# Patient Record
Sex: MALE | Race: WHITE | HISPANIC OR LATINO | Employment: OTHER | URBAN - METROPOLITAN AREA
[De-identification: names, ages, dates, MRNs, and addresses within clinical notes are randomized per-mention and may not be internally consistent; named-entity substitution may affect disease eponyms.]

---

## 2017-08-31 ENCOUNTER — SLEEP CENTER VISIT (OUTPATIENT)
Dept: SLEEP MEDICINE | Facility: MEDICAL CENTER | Age: 22
End: 2017-08-31
Payer: MEDICAID

## 2017-08-31 VITALS
WEIGHT: 315 LBS | HEART RATE: 82 BPM | RESPIRATION RATE: 22 BRPM | BODY MASS INDEX: 45.1 KG/M2 | DIASTOLIC BLOOD PRESSURE: 92 MMHG | SYSTOLIC BLOOD PRESSURE: 140 MMHG | TEMPERATURE: 98.8 F | HEIGHT: 70 IN | OXYGEN SATURATION: 92 %

## 2017-08-31 DIAGNOSIS — R63.8 INCREASED BMI: ICD-10-CM

## 2017-08-31 DIAGNOSIS — G47.33 OSA (OBSTRUCTIVE SLEEP APNEA): ICD-10-CM

## 2017-08-31 PROCEDURE — 99244 OFF/OP CNSLTJ NEW/EST MOD 40: CPT | Performed by: INTERNAL MEDICINE

## 2017-08-31 RX ORDER — ZOLPIDEM TARTRATE 5 MG/1
TABLET ORAL
Qty: 3 TAB | Refills: 0 | Status: SHIPPED | OUTPATIENT
Start: 2017-08-31 | End: 2021-10-25

## 2017-08-31 NOTE — PROGRESS NOTES
"CC: Sleep apnea evaluation    HPI:     22 year old male kindly referred by Dr. Delonte Vargas at [facility] for reevaluation of obstructive sleep syndrome.    The patient evidently has had multiple prior sleep studies studies performed which demonstrated sleep apnea but was noncompliant with positive airway pressure secondary to the mask straps being uncomfortable. The patient's symptoms include frequent nocturnal awakenings about 10 times a night and nocturia about 10 times a night, difficulty awakening and getting out of bed after sleeping, napping or returning to bed after arising, sleepiness during the day, too much sleep during the day, too little sleep at night, tiredness during the day, sleep paralysis episodes, difficulty catching his breath, sleep talking, teeth grinding, bedwetting, disturbing dreams, nervous legs, and loud and disturbing snoring. The patient also has a history of extremity movements during sleep.    He may fall asleep accidentally when watching TV, talking on the phone, or riding in a bus or car.    His roommate questionnaire reveals loud snoring, witnessed apneas, sleep talking, bedwetting, and resuscitative snorts since age 8. His episodes occur throughout the night. His total [place] score is 15.    Review of his sleep diary shows that he napped 5 out of 7 days. He woke up feeling \"good\" 7 out of 7 days.    Sunita his  accompanies him today.            Patient Active Problem List    Diagnosis Date Noted   • Cellulitis 09/19/2015       Past Medical History:   Diagnosis Date   • ADHD (attention deficit hyperactivity disorder)    • Apnea, sleep    • Bipolar 1 disorder (CMS-[facility])    • Daytime sleepiness    • Gasping for breath    • Obesity    • ODD (oppositional defiant disorder)    • Sleep apnea    • Snoring         Past Surgical History:   Procedure Laterality Date   • TONSILLECTOMY  3/17/2010    Performed by ANKIT HERNANDEZ at SURGERY SAME DAY [facility] ORS   • " "APPENDECTOMY CHILD         Family History   Problem Relation Age of Onset   • Family history unknown: Yes       Social History     Social History   • Marital status: Single     Spouse name: N/A   • Number of children: N/A   • Years of education: N/A     Occupational History   • Not on file.     Social History Main Topics   • Smoking status: Never Smoker   • Smokeless tobacco: Never Used   • Alcohol use No   • Drug use: No   • Sexual activity: Not on file     Other Topics Concern   • Not on file     Social History Narrative   • No narrative on file       Current Outpatient Prescriptions   Medication Sig Dispense Refill   • CARBAMAZEPINE PO Take  by mouth.     • LITHIUM CARBONATE PO Take  by mouth.     • zolpidem (AMBIEN) 5 MG Tab Take 1-2 tablets by mouth every evening as needed for insomnia. Bring to sleep study. 3 Tab 0   • lithium CR (ESKALITH CR) 450 MG Tab CR Take 450 mg by mouth every day.     • aripiprazole (ABILIFY) 15 MG Tab Take 30 mg by mouth every day.     • ibuprofen (MOTRIN) 600 MG Tab Take 1 Tab by mouth every 6 hours as needed. (Patient not taking: Reported on 8/31/2017) 45 Tab 0     No current facility-administered medications for this visit.     \"CURRENT RX\"    ALLERGIES: Review of patient's allergies indicates no known allergies.    ROS  Constitutional:Complains of fever and fatigue  Eyes: Denies glasses, vision loss, pain, drainage, double vision.   Ears/Nose/Mouth/Throat: Denies earache, difficulty hearing, ringing/buzzing in ears,  + rhinitis/nasal congestion, injury, recurrent sore throat, persistent hoarseness, decayed teeth/toothaches.   Cardiovascular: Denies chest pain, tightness, palpitations,  + swelling in legs/feet, fainting,  + difficulty breathing when lying down but gets better when sitting up.   Respiratory: Complains of shortness breath, cough, wheezing, painful breathing.  Sleep: Per history of present illness  Gastrointestinal: Denies heartburn, difficulty swallowing, nausea, " "abdominal pain, diarrhea, constipation.   Genitourinary: Denies, painful urination, blood in urine, discharge. Complains of frequent urination  Musculoskeletal: Denies painful joints,  + sore muscles, back pain.   Integumentary: Denies rashes,  + lumps, color changes.   Neurological: Denies frequent headaches, + dizziness,  + weakness    PHYSICAL EXAM  MSEW  /92   Pulse 82   Temp 37.1 °C (98.8 °F)   Resp (!) 22   Ht 1.778 m (5' 10\")   Wt (!) 206.4 kg (455 lb)   SpO2 92%   BMI 65.29 kg/m²   Appearance: Well-nourished, well-developed, no acute distress  Eyes:  PERRLA, EOMI  Hearing:  Grossly intact  Nose:  Normal, no lesions or deformities, turbinates moist  Oropharynx:  Tongue normal, posterior pharynx without erythema or exudate  Mallampati classification:    Neck: Supple, trachea midline, no masses  Respiratory effort:  No intercostal retractions or use of accessory muscles  Lung auscultation:  No wheezes rhonchi rubs or rales  Cardiac auscultation:  No murmurs, rubs, or gallops, no regular rhythm, normal rate  Abdomen:  No tenderness, no organomegaly  Extremities:  No cyanosis, clubbing; erythema and edema  Gait and Station:  Normal  Digits and nails: No clubbing, cyanosis, petechiae, or nodes  Musculoskeletal:  Grossly normal  Skin:  No rashes  Orientation:  Oriented time, place, and person  Mood and affect:  No depression, anxiety, agitation  Judgment:  Intact    PROBLEMS:    1. JEAN (obstructive sleep apnea)  - zolpidem (AMBIEN) 5 MG Tab; Take 1-2 tablets by mouth every evening as needed for insomnia. Bring to sleep study.  Dispense: 3 Tab; Refill: 0  - POLYSOMNOGRAPHY, 4 OR MORE; Future  2. Increased BMI        PLAN:   The patient has signs and symptoms consistent with obstructive sleep apnea hypopnea syndrome. He has had prior diagnostic studies which have reportedly shown significant sleep apnea. He's never been able to comply with positive airway pressure therapy. We do not have any of his prior " sleep studies. We will schedule him to have a repeat qualifying test but if he has already had one within the past 5 years, we could simply do a positive airway pressure titration treatment instead. Will schedule to have a nocturnal polysomnogram using zolpidem to assist with sleep onset and maintenance should the need arise. Will return after the results are available to determine further diagnostic needs and/or treatment options.    He will bring his Abilify which he uses for sleep. Can use zolpidem if he continues to have insomnia the night of the PSG.    The risks of untreated sleep apnea were discussed with the patient at length. Patients with JEAN are at increased risk of cardiovascular disease including coronary artery disease, systemic arterial hypertension, pulmonary arterial hypertension, cardiac arrythmias, and stroke. JEAN patients have an increased risk of motor vehicle accidents, type 2 diabetes, chronic kidney disease, and non-alcoholic liver disease. The patient was advised to avoid driving a motor vehicle when drowsy.    Positive airway pressure, such as CPAP, is considered first-line and preferred therapy for sleep apnea and may reverse both symptoms and risks.     Return for after sleep study.

## 2017-11-11 ENCOUNTER — SLEEP STUDY (OUTPATIENT)
Dept: SLEEP MEDICINE | Facility: MEDICAL CENTER | Age: 22
End: 2017-11-11
Attending: INTERNAL MEDICINE
Payer: MEDICAID

## 2017-11-11 DIAGNOSIS — G47.33 OSA (OBSTRUCTIVE SLEEP APNEA): ICD-10-CM

## 2017-11-11 PROCEDURE — 95811 POLYSOM 6/>YRS CPAP 4/> PARM: CPT | Performed by: INTERNAL MEDICINE

## 2017-11-14 ENCOUNTER — APPOINTMENT (OUTPATIENT)
Dept: SLEEP MEDICINE | Facility: MEDICAL CENTER | Age: 22
End: 2017-11-14
Payer: MEDICAID

## 2017-11-14 NOTE — PROCEDURES
CLINICAL COMMENTS:  The patient underwent a split night polysomnogram with a CPAP titration using the standard montage for measurement of parameters of sleep, respiratory events, movement abnormalities, heart rate and rhythm. A microphone was used to monitor snoring.    INTERPRETATION: The diagnostic recording time was 143.4 minutes with a sleep period of 141.0 minutes.  Total sleep time was 109.5 minutes with a sleep efficiency of 76.4%.  The sleep latency was 2.4 minutes, and REM latency was N/A minutes.  The patient had 82 arousals in total, for an arousal index of 44.9.        RESPIRATORY: The patient had 254 apneas in total.  Of these, 12 were obstructive apneas, and 242 were central apneas.  This resulted in an apnea index (AI) of 139.2.  The patient had 28 hypopneas in total, which resulted in a hypopnea index of 15.3.  The overall AHI was 154.5, while the AHI during REM was N/A.  The supine AHI = 153.4.    OXIMETRY: Oxygen saturation monitoring showed a mean SpO2 of 84.6% for the diagnostic part of the study, with a minimum oxygen saturation of 56.0%.  Oxygen saturations were below 89% for 70.7% of sleep time.    CARDIAC: The highest heart rate for the first part of the study was 104.0 beats per minute.  The average heart rate during sleep was 67.7 bpm, while the highest heart rate was 94.0 bpm.    LIMB MOVEMENTS: There were a total of 2 periodic limb movements during sleep, of which 0 were PLMS arousals.  This resulted in a PLMS index of 1.1 and a PLMS arousal index of 0.0.    TREATMENT    Treatment recording time was 291.8 minutes with a total sleep time of 258.9min.  The patient had an arousal index of 23.6.      RESPIRATORY: The patient had 7 obstructive apneas, 59 central apneas, and 198 hypopneas for an overall AHI was 61.2.    OXIMETRY: The mean SpO2 during treatment was 86.2%, with a minimum oxygen saturation of 68.0%.      Interpretation:    This was an overnight diagnostic polysomnogram with a  subsequent positive airway pressure titration.    During the diagnostic phase, the patient experienced a reduced sleep efficiency of 76.4% and had no REM. Stage durations showed 31.5 minutes of wake after sleep onset, 22.5 minutes of stage I sleep, 59.5 minutes of stage II sleep, 27.5 minutes of stage III sleep, and no REM. The latency to sleep was shortened at 2.4 minutes. Significant sleep fragmentation was found primarily secondary to frequent central apneas.    Severe mainly central sleep apnea was found. Central apneas comprised 85.8% of the 282 apneas and hypopneas. The apnea hypopnea index was 154.5, the mean event duration was 12.5 seconds, and the longest event lasted 33.8 seconds. The lowest saturation during sleep was 56% and saturations were normal, that is greater than or equal to 90%, for only 26.4% of the diagnostic recording.    Once the patient met the split-night protocol, the technician performed a positive airway pressure titration. The technician initiated treatment with CPAP at 5 cm water and gradually increased the pressure to a maximum of 14 cm water without event resolution. Bilevel was then tried starting with bilevel 18/14 and ending with bilevel 20/16 cm water. The patient did best on bilevel 19/15 with a resultant AHI 16.2 and improved saturations.    Recommendation:    Recommend the patient likely take a look at return for an ASV titration. Alternatively, a trial of bilevel 19/15 cm water is an option.

## 2017-11-28 ENCOUNTER — SLEEP CENTER VISIT (OUTPATIENT)
Dept: SLEEP MEDICINE | Facility: MEDICAL CENTER | Age: 22
End: 2017-11-28
Payer: MEDICAID

## 2017-11-28 VITALS
BODY MASS INDEX: 45.1 KG/M2 | HEIGHT: 70 IN | RESPIRATION RATE: 18 BRPM | WEIGHT: 315 LBS | DIASTOLIC BLOOD PRESSURE: 88 MMHG | SYSTOLIC BLOOD PRESSURE: 120 MMHG | OXYGEN SATURATION: 91 % | HEART RATE: 81 BPM

## 2017-11-28 DIAGNOSIS — G47.31 CENTRAL SLEEP APNEA: ICD-10-CM

## 2017-11-28 PROCEDURE — 99214 OFFICE O/P EST MOD 30 MIN: CPT | Performed by: FAMILY MEDICINE

## 2017-11-28 NOTE — PATIENT INSTRUCTIONS
Sleep Apnea   Sleep apnea is a sleep disorder characterized by abnormal pauses in breathing while you sleep. When your breathing pauses, the level of oxygen in your blood decreases. This causes you to move out of deep sleep and into light sleep. As a result, your quality of sleep is poor, and the system that carries your blood throughout your body (cardiovascular system) experiences stress. If sleep apnea remains untreated, the following conditions can develop:  · High blood pressure (hypertension).  · Coronary artery disease.  · Inability to achieve or maintain an erection (impotence).  · Impairment of your thought process (cognitive dysfunction).  There are three types of sleep apnea:  1. Obstructive sleep apnea--Pauses in breathing during sleep because of a blocked airway.  2. Central sleep apnea--Pauses in breathing during sleep because the area of the brain that controls your breathing does not send the correct signals to the muscles that control breathing.  3. Mixed sleep apnea--A combination of both obstructive and central sleep apnea.  RISK FACTORS  The following risk factors can increase your risk of developing sleep apnea:  · Being overweight.  · Smoking.  · Having narrow passages in your nose and throat.  · Being of older age.  · Being male.  · Alcohol use.  · Sedative and tranquilizer use.  · Ethnicity. Among individuals younger than 35 years,  Americans are at increased risk of sleep apnea.  SYMPTOMS   · Difficulty staying asleep.  · Daytime sleepiness and fatigue.  · Loss of energy.  · Irritability.  · Loud, heavy snoring.  · Morning headaches.  · Trouble concentrating.  · Forgetfulness.  · Decreased interest in sex.  · Unexplained sleepiness.  DIAGNOSIS   In order to diagnose sleep apnea, your caregiver will perform a physical examination. A sleep study done in the comfort of your own home may be appropriate if you are otherwise healthy. Your caregiver may also recommend that you spend the  "night in a sleep lab. In the sleep lab, several monitors record information about your heart, lungs, and brain while you sleep. Your leg and arm movements and blood oxygen level are also recorded.  TREATMENT  The following actions may help to resolve mild sleep apnea:  · Sleeping on your side.    · Using a decongestant if you have nasal congestion.    · Avoiding the use of depressants, including alcohol, sedatives, and narcotics.    · Losing weight and modifying your diet if you are overweight.  There also are devices and treatments to help open your airway:  · Oral appliances. These are custom-made mouthpieces that shift your lower jaw forward and slightly open your bite. This opens your airway.  · Devices that create positive airway pressure. This positive pressure \"splints\" your airway open to help you breathe better during sleep. The following devices create positive airway pressure:  ¨ Continuous positive airway pressure (CPAP) device. The CPAP device creates a continuous level of air pressure with an air pump. The air is delivered to your airway through a mask while you sleep. This continuous pressure keeps your airway open.  ¨ Nasal expiratory positive airway pressure (EPAP) device. The EPAP device creates positive air pressure as you exhale. The device consists of single-use valves, which are inserted into each nostril and held in place by adhesive. The valves create very little resistance when you inhale but create much more resistance when you exhale. That increased resistance creates the positive airway pressure. This positive pressure while you exhale keeps your airway open, making it easier to breath when you inhale again.  ¨ Bilevel positive airway pressure (BPAP) device. The BPAP device is used mainly in patients with central sleep apnea. This device is similar to the CPAP device because it also uses an air pump to deliver continuous air pressure through a mask. However, with the BPAP machine, the " pressure is set at two different levels. The pressure when you exhale is lower than the pressure when you inhale.  · Surgery. Typically, surgery is only done if you cannot comply with less invasive treatments or if the less invasive treatments do not improve your condition. Surgery involves removing excess tissue in your airway to create a wider passage way.     This information is not intended to replace advice given to you by your health care provider. Make sure you discuss any questions you have with your health care provider.     Document Released: 12/08/2003 Document Revised: 01/08/2016 Document Reviewed: 04/25/2013  [employer] Interactive Patient Education ©2016 [employer]

## 2017-11-28 NOTE — PROGRESS NOTES
[facility] Sleep [facility] Follow Up Note     Date: 11/28/2017 / Time: 9:51 AM    Patient ID:   Name:             Adama Burt   YOB: 1995  Age:                 22 y.o.  male   MRN:               [id number]      Thank you for requesting a sleep medicine consultation on Adama Burt at the sleep center. He presents today with the chief complaints of JEAN and sleep study follow up.     HISTORY OF PRESENT ILLNESS:       Pt is currently not on PAP therapy. He goes to sleep around 10 pm and wakes up around 6-8 am. He is getting about 5-8 hrs of sleep on a good night and about 5 hr of sleep on a bad night. The bad nights are about few times per week.    The split night study on 11/14/17 showed Severe mainly central sleep apnea was found. Central apneas   comprised 85.8% of the 282 apneas and hypopneas. The apnea hypopnea index was 154.5/hr. He met the split night criteria. treatment with CPAP at 5 cm water and gradually increased the pressure to a maximum of 14 cm water without event resolution. Bilevel was then tried starting with bilevel 18/14 and ending with bilevel 20/16 cm water. The patient did best on bilevel 19/15 with a resultant AHI 16.2 and improved saturations    REVIEW OF SYSTEMS:       Constitutional: Denies fevers, Denies weight changes  Eyes: Denies changes in vision, no eye pain  Ears/Nose/Throat/Mouth: Denies nasal congestion or sore throat   Cardiovascular: Denies chest pain or palpitations   Respiratory: Denies shortness of breath , Denies cough  Gastrointestinal/Hepatic: Denies abdominal pain, nausea, vomiting, diarrhea, constipation or GI bleeding   Genitourinary: Denies bladder dysfunction, dysuria or frequency  Musculoskeletal/Rheum: Denies  joint pain and swelling   Skin/Breast: Denies rash,   Neurological: Denies headache, confusion, memory loss or focal weakness/parasthesias  Psychiatric: denies mood disorder     Comprehensive review of systems form is reviewed  "with the patient and is attached in the EMR.     PMH:  has a past medical history of ADHD (attention deficit hyperactivity disorder); Apnea, sleep; Bipolar 1 disorder (CMS-HCC); Daytime sleepiness; Gasping for breath; Obesity; ODD (oppositional defiant disorder); Sleep apnea; and Snoring.  MEDS:   Current Outpatient Prescriptions:   •  CARBAMAZEPINE PO, Take  by mouth., Disp: , Rfl:   •  aripiprazole (ABILIFY) 15 MG Tab, Take 30 mg by mouth every day., Disp: , Rfl:   •  LITHIUM CARBONATE PO, Take  by mouth., Disp: , Rfl:   •  zolpidem (AMBIEN) 5 MG Tab, Take 1-2 tablets by mouth every evening as needed for insomnia. Bring to sleep study., Disp: 3 Tab, Rfl: 0  •  ibuprofen (MOTRIN) 600 MG Tab, Take 1 Tab by mouth every 6 hours as needed. (Patient not taking: Reported on 8/31/2017), Disp: 45 Tab, Rfl: 0  •  lithium CR (ESKALITH CR) 450 MG Tab CR, Take 450 mg by mouth every day., Disp: , Rfl:   ALLERGIES: No Known Allergies  SURGHX:   Past Surgical History:   Procedure Laterality Date   • TONSILLECTOMY  3/17/2010    Performed by ANKIT HERNANDEZ at SURGERY SAME DAY [facility] ORS   • APPENDECTOMY CHILD       SOCHX:  reports that he has never smoked. He has never used smokeless tobacco. He reports that he does not drink alcohol or use drugs..   FH:   Family History   Problem Relation Age of Onset   • Family history unknown: Yes         Physical Exam:  Vitals/ General Appearance:   Weight/BMI: Body mass index is 65.29 kg/m².  Blood pressure 120/88, pulse 81, resp. rate 18, height 1.778 m (5' 10\"), weight (!) 206.4 kg (455 lb), SpO2 91 %.  Vitals:    11/28/17 1036   BP: 120/88   Pulse: 81   Resp: 18   SpO2: 91%   Weight: (!) 206.4 kg (455 lb)   Height: 1.778 m (5' 10\")       Pt. is alert and oriented to time, place and person. Cooperative and in no apparent distress.       1. Head: Atraumatic, normocephalic.   2. Ears: Normal tympanic membrane and no discharge  3. Nose: No inferior turbinate hypertophy, no septal " deviation, no polyp.   4. Throat: Oropharynx appears crowded in that the palate is overhanging (Malam Kinsey scale 4. Tongue is enlarged.   5. Neck: Supple. No thyromegaly  6. Chest: Trachea central, no spine deformity   7. Lungs auscultation: B/L good air entry, vesicular breath sounds, no adventitious sounds  8. Heart auscultation: 1st and 2nd heart sounds normal, regular rhythm. No appreciable murmur.  9. Abdomen: Soft, non tender, no organomegaly. Bowel sounds present  10. Extremities: No, no deformity, no clubbing, no pedal edema.  11. Skin: No rash  12. NEUROLOGICAL EXAMINATION: On neurological exam, the patient was alert and oriented x3. speech was clear and fluent without dysarthria. Motor exam revealed normal bulk, tone and strength 5/5, which was symmetrical in the upper and lower extremities bilaterally.        INVESTIGATIONS:           ASSESSMENT AND PLAN     1.Ideopathic primary Central Sleep Apnea.he is has no obvious risk factors for central apnea. He  Is currently not on PAP therapy.The symptoms of excessive daytime, snoring and gasping persist.       The pathophysiology of JEAN and the increased risk of cardiovascular morbidity from untreated JEAN is discussed in detail with the patient.    He is urged to avoid supine sleep, weight gain and alcoholic beverages since all of these can worsen JEAN. He is cautioned against drowsy driving. If He feels sleepy while driving, He must pull over for a break/nap, rather than persist on the road, in the interest of He own safety and that of others on the road.   Plan   - split night study results were reviewed and discussed   - due to sub optimal titration, dedicated BiPAP / ASV titration is ordered today     2. Obesity  - recommended healthy diet with portion control , aerobic exercise 5x week  - recommended nutritionist referral however pt deffered at the time  - obesity counseling done today: Drink more water. Reduce carb intake in drinks. Try to eat 3 meals a  day with last meal 4-6 hours prior to bedtime. Limit caloric intake to 1600 - 1800 kcal per day.Restrict carbohydrate intake to 50 g per day to 100 g per day  - pt stated snoring and non refreshing sleep. Pt was recommended to discuss sleep related breathing disorder with the PCP since JEAN is an independent risk factor for stroke and heart disease.     3.Regarding treatment of other past medical problems and general health maintenance,  He is urged to follow up with PCP.    F/U after titration study

## 2018-01-23 ENCOUNTER — SLEEP STUDY (OUTPATIENT)
Dept: SLEEP MEDICINE | Facility: MEDICAL CENTER | Age: 23
End: 2018-01-23
Payer: MEDICAID

## 2018-01-23 DIAGNOSIS — G47.31 CENTRAL SLEEP APNEA: ICD-10-CM

## 2018-01-23 PROCEDURE — 95811 POLYSOM 6/>YRS CPAP 4/> PARM: CPT | Performed by: FAMILY MEDICINE

## 2018-01-24 NOTE — PROCEDURES
Comments:  The patient underwent a diagnostic polysomnogram using the standard montage for measurement of parameters of sleep, respiratory events, movement abnormalities, and heart rate and rhythm.    A microphone was used to monitor snoring.  Interpretation:  Study start time was 07:46:26 PM.  Diagnostic recording time was 9h 27.5m with a total sleep time of 8h 51.5m resulting in a sleep efficiency of 93.66%%.    Sleep latency from the start of the study was 10 minutes and the latency from sleep to REM was 09 minutes.  In total,38  arousals were scored for an arousal index of 4.3.  Respiratory:  There were a total of 87 apneas consisting of 0 obstructive apneas, 0 mixed apneas, and 87 central apneas.  A total of 306 hypopneas were scored.  The apnea index was 9.82 per hour and the hypopnea index was 34.54 per hour resulting in an overall AHI of 44.37.  AHI during rem was 4.97 and AHI while supine was 44.37.  Oximetry:  There was a mean oxygen saturation of 91.0% with a minimum oxygen saturation of 79.0%.  Time spent with oxygen saturations below 89% was 31.2 minutes.  Cardiac:  The highest heart rate seen while awake was 104 BPM while the highest heart rate during sleep was 128 BPM with an average sleeping heart rate of 71 BPM.  Limb Movements:  There were a total of 8 PLMs during sleep, of which 2 were PLMS arousals.  This resulted in a PLMS index of 0.9 and a PLMS arousal index of 0.2.    BiPAP was tried from 18/14 to 23/19 cm H2O.    Technical summary: The patient underwent a CPAP titration.  This was a 16 channel montage study to include a 6 channel EEG, a 2 channel EOG, and chin EMG, left and right leg EMG, a snore channel, and a CFLOW pressure transducer.   Respiratory effort was assessed with the use of a thoracic and abdominal monitor and overnight oximetry was obtained. Audio and video recordings were reviewed. This was a fully attended study and sleep stage scoring was performed. The test was technically  adequate.    General sleep summary:  During the overnight study, the sleep latency was 10 min which is normal. The REM latency was 136 min which is increased. The total sleep time was 531 min and sleep efficiency was 93%  which is normal.  Sleep stage proportions showed REM and N3 sleep with normal WASO.  In regards to sleep quality there was a normal degree of sleep fragmentation as shown by the arousal index of 4 an hour which normal. The arousals were due to spontaneous arousal.    CPAP Titration:  The PAP titration was initiated with BiPAP 18/14 cm of water and the pressure which was slowly titrated up in an attempt to eliminate sleep disordered breathing and snoring. The BiPAP was increased to 23/19 cm however the central apneas persisted. Therefore he was switched to Auto ASV titration. However he only had short period of sleep on ASV. The average ASV EPAP pressure on the night of the study was EPAP 12 cm. At this final pressure the patient was observed in the supine position and in the REM sleep stage. The apnea hypopnea index improved to 28 per hour and O2 zoe 79%. The average O2 stauration was 91%. Snoring was resolved. There were no significant periodic limb movements.  The patient demonstrated NSR and an average heart rate of 71 beats per minute.  There was no ventricular ectopy or tachyarrhythmias. The patient utilized large Amaraview mask with heated humidification. The CPAP was well-tolerated and there were minimal air leaks. No supplemental oxygen was required.    Indication: Severe mainly central sleep apnea was found. Central apneas comprised 85.8% of the 282 apneas and hypopneas. The apnea hypopnea index was 154.5/hr.     Impression:  1.  Central Sleep Apnea  2.  Sub optimal BiPAP and ASV titration   3.  Sleep hypoxia       Recommendations:  Even tough no definitive pressure can be extrapolated from this titration study, I recommend Auto ASV with EPAP min 10 cm and EPAP max 25 with PS 4/15 cm.  However if the AHI is still elevated on the compliance download, dedicated ASV titration is recommended rather than auto ASV.  Consider overnight pulse ox and 30 day compliance download that can measure leak, apnea hypopnea index and compliance for further outpatient monitoring and management of CPAP therapy. In some cases alternative treatment options may prove effective in resolving sleep apnea and these options include upper airway surgery, the use of a dental orthotic or weight loss and positional therapy. Clinical correlation is required. In general patients with sleep apnea are advised to avoid alcohol and sedatives and to not operate a motor vehicle while drowsy and are at a greater risk for cardiovascular disease.

## 2018-01-25 ENCOUNTER — TELEPHONE (OUTPATIENT)
Dept: SLEEP MEDICINE | Facility: MEDICAL CENTER | Age: 23
End: 2018-01-25

## 2018-01-25 NOTE — TELEPHONE ENCOUNTER
Pt lives in a care facility and they need a letter from his sleep doctor stating that due to his size he has abnormal sleep habits such as sleeping on sofas, in chairs and a mattress w/o a box spring and low to the ground for his convenience. They have been contacted by the state stating that this is not ok unless he has a letter from doctor.    Pt had Sleep study with us 1/23/2018, coming back for results 02/20/2018.    Please write letter if ok.    Sending to Mag in email at [email or web address].

## 2018-01-25 NOTE — LETTER
January 25, 2018         Patient: Adama Burt   YOB: 1995   Date of Visit: 1/25/2018           To Whom it May Concern:    Adama Burt was seen in my clinic on 1/25/2018. He was diagnosed with Severe mainly central sleep apnea was found. Central apneas comprised 85.8% of the 282 apneas and hypopneas. Once he met the criteria for split night study titration started at CPAP at 5 cm water and gradually increased the pressure to a maximum of 14 cm water without eventresolution. Bilevel was then tried starting with bilevel 18/14 and ending with bilevel 20/16 cm water. The patient did best on bilevel 19/15 with a resultant AHI 16.2 and improved saturations.    Due to suboptimal titration we recommend dedicated ASV titration. Until he is on positive airway therapy sleeping ether upright/ or partially upright position can slightly improve the severity, he should avoid alcohol intake as well since it can worsen sleep apnea.    If you have any questions or concerns, please don't hesitate to call.         Sincerely,           Jackie Gant M.D.  Electronically Signed

## 2018-01-26 DIAGNOSIS — G47.31 CENTRAL SLEEP APNEA: ICD-10-CM

## 2018-01-26 DIAGNOSIS — G47.34 SLEEP RELATED HYPOXIA: ICD-10-CM

## 2018-02-20 ENCOUNTER — SLEEP CENTER VISIT (OUTPATIENT)
Dept: SLEEP MEDICINE | Facility: MEDICAL CENTER | Age: 23
End: 2018-02-20
Payer: MEDICAID

## 2018-02-20 VITALS
RESPIRATION RATE: 16 BRPM | OXYGEN SATURATION: 87 % | TEMPERATURE: 98.2 F | HEART RATE: 84 BPM | DIASTOLIC BLOOD PRESSURE: 82 MMHG | BODY MASS INDEX: 45.1 KG/M2 | WEIGHT: 315 LBS | HEIGHT: 70 IN | SYSTOLIC BLOOD PRESSURE: 142 MMHG

## 2018-02-20 DIAGNOSIS — G47.31 CENTRAL SLEEP APNEA: ICD-10-CM

## 2018-02-20 DIAGNOSIS — R09.02 HYPOXIA: ICD-10-CM

## 2018-02-20 PROCEDURE — 99214 OFFICE O/P EST MOD 30 MIN: CPT | Performed by: FAMILY MEDICINE

## 2018-02-20 NOTE — PROGRESS NOTES
[facility] Follow Up Note     Date: 2/20/2018 / Time: 8:03 AM    Patient ID:   Name:             Adama Burt   YOB: 1995  Age:                 22 y.o.  male   MRN:               [id number]      Thank you for requesting a sleep medicine consultation on Adama Burt at the sleep center. He presents today with the chief complaints of central sleep apnea and titration study follow up.     HISTORY OF PRESENT ILLNESS:       Pt is currently not on PAP therapy. He goes to sleep around 10 pm and wakes up around 6-8 am. He is getting about 5-8 hrs of sleep on a good night and about 5 hr of sleep on a bad night. The bad nights are about few times per week. The symptoms of excessive daytime, snoring and gasping has continued. He had titration study and even tough no definitive pressure can be extrapolated from this titration study, it was recommended Auto ASV with EPAP min 10 cm and   EPAP max 25 with PS 4/15 cm.        SLEEP HISTORY   Titration study: The PAP titration was initiated with BiPAP 18/14 cm of water and the pressure which was slowly titrated up in an attempt to eliminate sleep disordered breathing and snoring. The BiPAP was increased to 23/19 cm however the central apneas persisted. Therefore he was switched to Auto ASV titration. However he only had short period of sleep on ASV. The average ASV EPAP pressure on the night of the study was EPAP 12 cm. At this final pressure the patient was observed in the supine position and in the REM sleep stage. The apnea hypopnea index improved to 28 per hour and O2 zoe 79%. The average O2 stauration was 91%.     Split night: The split night study on 11/14/17 showed Severe mainly central sleep apnea was found. Central apneas   comprised 85.8% of the 282 apneas and hypopneas. The apnea hypopnea index was 154.5/hr. He met the split night criteria. treatment with CPAP at 5 cm water and gradually increased the pressure to a  maximum of 14 cm water without event resolution. Bilevel was then tried starting with bilevel 18/14 and ending with bilevel 20/16 cm water. The patient did best on bilevel 19/15 with a resultant AHI 16.2 and improved saturations         REVIEW OF SYSTEMS:       Constitutional: Denies fevers, Denies weight changes  Eyes: Denies changes in vision, no eye pain  Ears/Nose/Throat/Mouth: Denies nasal congestion or sore throat   Cardiovascular: Denies chest pain or palpitations   Respiratory: Denies shortness of breath , Denies cough  Gastrointestinal/Hepatic: Denies abdominal pain, nausea, vomiting, diarrhea, constipation or GI bleeding   Genitourinary: Denies bladder dysfunction, dysuria or frequency  Musculoskeletal/Rheum: Denies  joint pain and swelling   Skin/Breast: Denies rash,   Neurological: Denies headache, confusion, memory loss or focal weakness/parasthesias  Psychiatric: denies mood disorder     Comprehensive review of systems form is reviewed with the patient and is attached in the EMR.     PMH:  has a past medical history of ADHD (attention deficit hyperactivity disorder); Apnea, sleep; Bipolar 1 disorder (CMS-HCC); Daytime sleepiness; Gasping for breath; Obesity; ODD (oppositional defiant disorder); Sleep apnea; and Snoring.  MEDS:   Current Outpatient Prescriptions:   •  CARBAMAZEPINE PO, Take  by mouth., Disp: , Rfl:   •  aripiprazole (ABILIFY) 15 MG Tab, Take 30 mg by mouth every day., Disp: , Rfl:   •  LITHIUM CARBONATE PO, Take  by mouth., Disp: , Rfl:   •  zolpidem (AMBIEN) 5 MG Tab, Take 1-2 tablets by mouth every evening as needed for insomnia. Bring to sleep study., Disp: 3 Tab, Rfl: 0  •  ibuprofen (MOTRIN) 600 MG Tab, Take 1 Tab by mouth every 6 hours as needed. (Patient not taking: Reported on 8/31/2017), Disp: 45 Tab, Rfl: 0  •  lithium CR (ESKALITH CR) 450 MG Tab CR, Take 450 mg by mouth every day., Disp: , Rfl:   ALLERGIES: No Known Allergies  SURGHX:   Past Surgical History:   Procedure  "Laterality Date   • TONSILLECTOMY  3/17/2010    Performed by ANKIT HERNANDEZ at SURGERY SAME DAY [facility] ORS   • APPENDECTOMY CHILD       SOCHX:  reports that he has never smoked. He has never used smokeless tobacco. He reports that he does not drink alcohol or use drugs..  FH:   Family History   Problem Relation Age of Onset   • Family history unknown: Yes         Physical Exam:  Vitals/ General Appearance:   Weight/BMI: Body mass index is 65.29 kg/m².  Blood pressure 142/82, pulse 84, temperature 36.8 °C (98.2 °F), resp. rate 16, height 1.778 m (5' 10\"), weight (!) 206.4 kg (455 lb), SpO2 (!) 87 %.  Vitals:    02/20/18 0818   BP: 142/82   Pulse: 84   Resp: 16   Temp: 36.8 °C (98.2 °F)   SpO2: (!) 87%   Weight: (!) 206.4 kg (455 lb)   Height: 1.778 m (5' 10\")       Pt. is alert and oriented to time, place and person. Cooperative and in no apparent distress.       1. Head: Atraumatic, normocephalic.   2. Ears: Normal tympanic membrane and no discharge  3. Nose: No inferior turbinate hypertophy, no septal deviation, no polyp.   4. Throat: Oropharynx appears crowded in that the palate is overhanging (Malam Kinsey scale 4. Tongue is enlarged.   5. Neck: Supple. No thyromegaly  6. Chest: Trachea central, no spine deformity   7. Lungs auscultation: B/L good air entry, vesicular breath sounds, no adventitious sounds  8. Heart auscultation: 1st and 2nd heart sounds normal, regular rhythm. No appreciable murmur.  9. Abdomen: Soft, non tender, no organomegaly. Bowel sounds present  10. Extremities: No, no deformity, no clubbing, no pedal edema.  11. Skin: No rash  12. NEUROLOGICAL EXAMINATION: On neurological exam, the patient was alert and oriented x3. speech was clear and fluent without dysarthria.      INVESTIGATIONS:           ASSESSMENT AND PLAN     1.Ideopathic primary Central Sleep Apnea.He  Is currently not on PAP therapy.       The pathophysiology of JEAN and the increased risk of cardiovascular morbidity from " untreated JEAN is discussed in detail with the patient.    He is urged to avoid supine sleep, weight gain and alcoholic beverages since all of these can worsen JEAN. He is cautioned against drowsy driving. If He feels sleepy while driving, He must pull over for a break/nap, rather than persist on the road, in the interest of He own safety and that of others on the road.   Plan   -  Respironics Auto ASV with EPAP min 12 cm back up rate 112 bpm and EPAP max 25 with PS 4/15 cm is ordered today    - if residual AHi elevated on the recommended pressure, consider dedicated ASV titration with Respironics    - F/u in 6 weeks to assess the efficiacy of recommended pressure    - Titration study  was reviewed and discussed with the pt   - compliance was reinforced      2. Obesity  - recommended healthy diet with portion control , aerobic exercise 5x week  - medical weight management and bariatric surgery referral done today   - obesity counseling done today: Drink more water. Reduce carb intake in drinks. Try to eat 3 meals a day with last meal 4-6 hours prior to bedtime. Limit caloric intake to 1600 - 1800 kcal per day.Restrict carbohydrate intake to 50 g per day to 100 g per day    3. He had borderline low oxygen initially at 87-88 but after 1-2 mins of sitting down it normalized and was in low 90's. Currently not on home oxygen. If hypoxic on excertion on next visit, consider multi ox for supplemental O2 for day time.

## 2018-05-21 ENCOUNTER — SLEEP CENTER VISIT (OUTPATIENT)
Dept: SLEEP MEDICINE | Facility: MEDICAL CENTER | Age: 23
End: 2018-05-21
Payer: MEDICAID

## 2018-05-21 VITALS
OXYGEN SATURATION: 92 % | RESPIRATION RATE: 15 BRPM | SYSTOLIC BLOOD PRESSURE: 134 MMHG | HEIGHT: 70 IN | DIASTOLIC BLOOD PRESSURE: 84 MMHG | HEART RATE: 76 BPM | BODY MASS INDEX: 45.1 KG/M2 | WEIGHT: 315 LBS

## 2018-05-21 DIAGNOSIS — G47.31 CENTRAL SLEEP APNEA: ICD-10-CM

## 2018-05-21 PROCEDURE — 99213 OFFICE O/P EST LOW 20 MIN: CPT | Performed by: FAMILY MEDICINE

## 2018-05-21 RX ORDER — NALTREXONE HYDROCHLORIDE 50 MG/1
50 TABLET, FILM COATED ORAL DAILY
COMMUNITY
End: 2020-02-13

## 2018-05-21 NOTE — PROGRESS NOTES
[facility] Follow Up Note     Date: 5/21/2018 / Time: 8:12 AM    Patient ID:   Name:             Adama Burt   YOB: 1995  Age:                 22 y.o.  male   MRN:               [id number]      Thank you for requesting a sleep medicine consultation on Adama Burt at the sleep center. He presents today with the chief complaints of CSA follow up.     HISTORY OF PRESENT ILLNESS:       Pt is currently on ASV EPAP 12 cm PS 3/14 cm. He goes to sleep around *10 pm and wakes up around 6-8 am. He is getting about 5-8 hrs of sleep on a good night and about 5 hr of sleep on a bad night. The bad nights are about few 2-3 per week He is using ASV most days of the week. Pt reports 3 hrs of average nightly use of ASV. Pt denies snoring, gasping,choking.Pt also denies significant mask leak that is interfering with sleep.      The 30 day compliance was downloaded which shows indequate compliance with more that 4 hr usage about 33%. The AHI is has improved to 3.1/hr. AVg EPAP 12 cm Evg PS 4.6 90% PS 12The mask leak is normal. The symptoms of excessive daytime, snoring and gasping has significantly improved. However he feels like the headgear is already stretched out in a month.        SLEEP HISTORY   Titration study: The PAP titration was initiated with BiPAP 18/14 cm of water and the pressure which was slowly titrated up in an attempt to eliminate sleep disordered breathing and snoring. The BiPAP was increased to 23/19 cm however the central apneas persisted. Therefore he was switched to Auto ASV titration. However he only had short period of sleep on ASV. The average ASV EPAP pressure on the night of the study was EPAP 12 cm. At this final pressure the patient was observed in the supine position and in the REM sleep stage. The apnea hypopnea index improved to 28 per hour and O2 zoe 79%. The average O2 stauration was 91%.      Split night: The split night study on 11/14/17 showed  Severe mainly central sleep apnea was found. Central apneas   comprised 85.8% of the 282 apneas and hypopneas. The apnea hypopnea index was 154.5/hr. He met the split night criteria. treatment with CPAP at 5 cm water and gradually increased the pressure to a maximum of 14 cm water without event resolution. Bilevel was then tried starting with bilevel 18/14 and ending with bilevel 20/16 cm water. The patient did best on bilevel 19/15 with a resultant AHI 16.2 and improved saturations    REVIEW OF SYSTEMS:       Constitutional: Denies fevers, Denies weight changes  Eyes: Denies changes in vision, no eye pain  Ears/Nose/Throat/Mouth: Denies nasal congestion or sore throat   Cardiovascular: Denies chest pain or palpitations   Respiratory: Denies shortness of breath , Denies cough  Gastrointestinal/Hepatic: Denies abdominal pain, nausea, vomiting, diarrhea, constipation or GI bleeding   Genitourinary: Denies bladder dysfunction, dysuria or frequency  Musculoskeletal/Rheum: Denies  joint pain and swelling   Skin/Breast: Denies rash,   Neurological: Denies headache, confusion, memory loss or focal weakness/parasthesias  Psychiatric: denies mood disorder     Comprehensive review of systems form is reviewed with the patient and is attached in the EMR.     PMH:  has a past medical history of ADHD (attention deficit hyperactivity disorder); Apnea, sleep; Bipolar 1 disorder (HCC); Daytime sleepiness; Gasping for breath; Obesity; ODD (oppositional defiant disorder); Sleep apnea; and Snoring.  MEDS:   Current Outpatient Prescriptions:   •  naltrexone (DEPADE) 50 MG Tab, Take 50 mg by mouth every day., Disp: , Rfl:   •  CARBAMAZEPINE PO, Take  by mouth., Disp: , Rfl:   •  aripiprazole (ABILIFY) 15 MG Tab, Take 30 mg by mouth every day., Disp: , Rfl:   •  LITHIUM CARBONATE PO, Take  by mouth., Disp: , Rfl:   •  zolpidem (AMBIEN) 5 MG Tab, Take 1-2 tablets by mouth every evening as needed for insomnia. Bring to sleep study., Disp: 3  "Tab, Rfl: 0  •  ibuprofen (MOTRIN) 600 MG Tab, Take 1 Tab by mouth every 6 hours as needed. (Patient not taking: Reported on 8/31/2017), Disp: 45 Tab, Rfl: 0  •  lithium CR (ESKALITH CR) 450 MG Tab CR, Take 450 mg by mouth every day., Disp: , Rfl:   ALLERGIES: No Known Allergies  SURGHX:   Past Surgical History:   Procedure Laterality Date   • TONSILLECTOMY  3/17/2010    Performed by ANKIT HERNANDEZ at SURGERY SAME DAY [facility] ORS   • APPENDECTOMY CHILD       SOCHX:  reports that he has never smoked. He has never used smokeless tobacco. He reports that he does not drink alcohol or use drugs..  FH:   Family History   Problem Relation Age of Onset   • Family history unknown: Yes         Physical Exam:  Vitals/ General Appearance:   Weight/BMI: Body mass index is 65.43 kg/m².  Blood pressure 134/84, pulse 76, resp. rate 15, height 1.778 m (5' 10\"), weight (!) 206.8 kg (456 lb), SpO2 92 %.  Vitals:    05/21/18 0800   BP: 134/84   Pulse: 76   Resp: 15   SpO2: 92%   Weight: (!) 206.8 kg (456 lb)   Height: 1.778 m (5' 10\")       Pt. is alert and oriented to time, place and person. Cooperative and in no apparent distress.        1. Head: Atraumatic, normocephalic.   2. Ears: Normal tympanic membrane and no discharge  3. Nose: No inferior turbinate hypertophy, no septal deviation, no polyp.   4. Throat: Oropharynx appears crowded in that the palate is overhanging (Malam Kinsey scale 4. Tongue is enlarged.   5. Neck: Supple. No thyromegaly  6. Chest: Trachea central, no spine deformity   7. Lungs auscultation: B/L good air entry, vesicular breath sounds, no adventitious sounds  8. Heart auscultation: 1st and 2nd heart sounds normal, regular rhythm. No appreciable murmur.  9. Abdomen: Soft, non tender, no organomegaly. Bowel sounds present  10. Extremities: No, no deformity, no clubbing, no pedal edema.  11. Skin: No rash    INVESTIGATIONS:           ASSESSMENT AND PLAN     1.CSA.He  Is currently on  ASV  EPAP 12 cm PS 4/13 " cm . 30 day compliance was downloaded which shows inadequate compliance. The symptoms of excessive daytime, snoring and gasping has improved      The pathophysiology of CSA and the increased risk of cardiovascular morbidity from untreated CSAis discussed in detail with the patient.      He is urged to avoid supine sleep, weight gain and alcoholic beverages since all of these can worsen CSA. He is cautioned against drowsy driving. If He feels sleepy while driving, He must pull over for a break/nap, rather than persist on the road, in the interest of He own safety and that of others on the road.   Plan   - ContinueSV  EPAP 12 cm PS 4/13 cm   - compliance download was reviewed and discussed with the pt   - compliance was reinforced     2. . Regarding treatment of other past medical problems and general health maintenance,  He is urged to follow up with PCP.

## 2018-07-27 PROCEDURE — 99284 EMERGENCY DEPT VISIT MOD MDM: CPT

## 2018-07-27 ASSESSMENT — PAIN SCALES - GENERAL: PAINLEVEL_OUTOF10: 0

## 2018-07-28 ENCOUNTER — HOSPITAL ENCOUNTER (EMERGENCY)
Facility: MEDICAL CENTER | Age: 23
End: 2018-07-28
Attending: EMERGENCY MEDICINE
Payer: MEDICAID

## 2018-07-28 VITALS
RESPIRATION RATE: 18 BRPM | WEIGHT: 315 LBS | HEIGHT: 70 IN | DIASTOLIC BLOOD PRESSURE: 95 MMHG | SYSTOLIC BLOOD PRESSURE: 148 MMHG | BODY MASS INDEX: 45.1 KG/M2 | HEART RATE: 68 BPM | OXYGEN SATURATION: 95 % | TEMPERATURE: 97.3 F

## 2018-07-28 DIAGNOSIS — T16.1XXA FOREIGN BODY OF RIGHT EAR, INITIAL ENCOUNTER: ICD-10-CM

## 2018-07-28 PROCEDURE — 700102 HCHG RX REV CODE 250 W/ 637 OVERRIDE(OP): Performed by: EMERGENCY MEDICINE

## 2018-07-28 PROCEDURE — A9270 NON-COVERED ITEM OR SERVICE: HCPCS | Performed by: EMERGENCY MEDICINE

## 2018-07-28 RX ORDER — CEPHALEXIN 500 MG/1
500 CAPSULE ORAL 4 TIMES DAILY
Qty: 28 CAP | Refills: 0 | Status: SHIPPED | OUTPATIENT
Start: 2018-07-28 | End: 2018-08-04

## 2018-07-28 RX ORDER — CEPHALEXIN 500 MG/1
500 CAPSULE ORAL ONCE
Status: COMPLETED | OUTPATIENT
Start: 2018-07-28 | End: 2018-07-28

## 2018-07-28 RX ADMIN — CEPHALEXIN 500 MG: 500 CAPSULE ORAL at 01:26

## 2018-07-28 NOTE — ED NOTES
Patient was educated on discharge instructions.  Patient was informed about diagnosis, symptom management, risks, and home care instructions.  Patient verbalized understanding and signed discharge instructions. Prescription sent to pharmacy. Copy of discharge instructions in chart.  Patient ambulated out with steady gait.  Patient has personal belongings.

## 2018-07-28 NOTE — ED PROVIDER NOTES
"ED Provider Note    Scribed for Daren Hermosillo M.D. by Daren Hermosillo. 7/28/2018  1:15 AM    CHIEF COMPLAINT  Chief Complaint   Patient presents with   • Foreign Body in Ear     Pt. states he has his earing stuck in his right earlobe. Pt. has minimal swelling and redness to that earlobe but palpable object and blood present near piercing site.       HPI  Adama Burt is a 23 y.o. male who presents to the Emergency Room Reporting that about 3 hours ago while trying to take his earring out, the juvenile the sharyn stud actually became stuck inside of his earlobe below the surface, with the metal tip of the stud projecting out of the back of the earlobe.  He reports moderate throbbing and sometimes sharp pain, and has not been able to get the earring out.  There is some serosanguineous drainage from the ear lobe.    REVIEW OF SYSTEMS  See HPI for further details.    PAST MEDICAL HISTORY   has a past medical history of ADHD (attention deficit hyperactivity disorder); Apnea, sleep; Bipolar 1 disorder (HCC); Daytime sleepiness; Gasping for breath; Obesity; ODD (oppositional defiant disorder); Sleep apnea; and Snoring.    SOCIAL HISTORY  Social History     Social History Main Topics   • Smoking status: Never Smoker   • Smokeless tobacco: Never Used   • Alcohol use No   • Drug use: No   • Sexual activity: Not on file       SURGICAL HISTORY   has a past surgical history that includes appendectomy child and tonsillectomy (3/17/2010).    CURRENT MEDICATIONS  Home Medications    **Home medications have not yet been reviewed for this encounter**         ALLERGIES  No Known Allergies    PHYSICAL EXAM  VITAL SIGNS: /95   Pulse 68   Temp 36.3 °C (97.3 °F) (Temporal)   Resp 18   Ht 1.778 m (5' 10\")   Wt (!) 209.7 kg (462 lb 4.9 oz)   SpO2 95%   BMI 66.33 kg/m²   Pulse ox interpretation: I interpret this pulse ox as normal.  Constitutional: Alert in no apparent distress.  HENT: Normocephalic, " Atraumatic, Bilateral external ears normal. Nose normal.   Eyes: Conjunctiva normal, non-icteric.   Heart:   Normal peripheral perfusion    Lungs:  Unlabored respirations  Skin:  Swollen and erythematous and tense right earlobe with the post of a stud type earring projecting out of the back of the earlobe, with residual of the stud not visible, presumably embedded in the flesh of the earlobe itself  Neurologic: Alert, Grossly non-focal.   Psychiatric: Affect normal, Judgment normal, Mood normal, Appears appropriate and not intoxicated.     Foreign Body Removal Procedure Note    Indication: Foreign body under the skin    Procedure: The area of the foreign body was  Gently cleansed. Local anesthesia over the foreign body site was obtained by infiltration using 1% Lidocaine without epinephrine.  The foreign body was then  Removed in the process of irrigating the ear lobe to try to loosen the stud.  After the procedure the area was left open.     The patient tolerated the procedure well.    Complications: None        COURSE & MEDICAL DECISION MAKING  The patient's VS, Nurses notes reviewed. (See chart for details)    1:15 AM Patient seen and examined at bedside.  The patient's embedded earring was removed from his ear as above.  Because of the erythema and small amount of serosanguineous drainage, there may be some early infection.  Given the patient's first dose of Keflex, and we will keep him on Keflex for a week.       The patient will return for new or worsening symptoms and is stable at the time of discharge.    The patient is referred to a primary physician for blood pressure management, diabetic screening, and for all other preventative health concerns.      DISPOSITION:  Patient will be discharged home in stable condition.    FOLLOW UP:  [facility], Emergency Dept  [address] [place] [id number] [phone]    As needed, If symptoms worsen      OUTPATIENT MEDICATIONS:  Discharge  Medication List as of 7/28/2018  1:32 AM      START taking these medications    Details   cephALEXin (KEFLEX) 500 MG Cap Take 1 Cap by mouth 4 times a day for 7 days., Disp-28 Cap, R-0, Normal               FINAL IMPRESSION  1. Foreign body of right ear, initial encounter

## 2018-07-28 NOTE — ED TRIAGE NOTES
"Adama Burt  23 y.o. male  Chief Complaint   Patient presents with   • Foreign Body in Ear     Pt. states he has his earing stuck in his right earlobe. Pt. has minimal swelling and redness to that earlobe but palpable object and blood present near piercing site.     BP (!) 170/116   Pulse (!) 103   Temp 36.3 °C (97.3 °F) (Temporal)   Resp 16   Ht 1.778 m (5' 10\")   Wt (!) 209.7 kg (462 lb 4.9 oz)   SpO2 98%   BMI 66.33 kg/m²     Pt amb to triage with steady gait for above complaint. Pt. Is of morbid body habitus and stated hx of HTN.  Pt is alert and oriented, speaking in full sentences, follows commands and responds appropriately to questions. NAD. Resp are even and unlabored.  Pt placed in lobby. Pt educated on triage process. Pt encouraged to alert staff for any changes.  "

## 2018-07-28 NOTE — DISCHARGE INSTRUCTIONS
Don't wear earrings until your ear is completely healed and you have finished your full week of antibiotics. Wash your ear at least twice daily with soap and water. Don't use peroxide or alcohol.     Foreign Body  A foreign body is something in your body that should not be there. This may have been caused by a puncture wound or other injury. Puncture wounds become easily infected. This happens when bacteria (germs) get under the skin. Manny nails and similar foreign bodies are often dirty and carry germs on them.   TREATMENT   · A foreign body is usually removed if this can be easily done right after it happens.   · Sometimes they are left in and removed at a later surgery. They may be left in indefinitely if they will not cause later problems.   · The following are general instructions in caring for your wound.   HOME CARE INSTRUCTIONS   · A dressing, depending on the location of the wound, may have been applied. This may be changed once per day or as instructed. If the dressing sticks, it may be soaked off with soapy water or hydrogen peroxide.   · Only take over-the-counter or prescription medicines for pain, discomfort, or fever as directed by your caregiver.   · Be aware that your body will work to remove the foreign substance. That is, the foreign body may work itself out of the wound. That is normal.   · You may have received a recommendation to follow up with your physician or a specialist. It is very important to call for or keep follow-up appointments in order to avoid infection or other complications.   SEEK IMMEDIATE MEDICAL CARE IF:   · There is redness, swelling, or increasing pain in the wound.   · You notice a foul smell coming from the wound or dressing.   · Pus is coming from the wound.   · An unexplained oral temperature above 102° F (38.9° C) develops, or as your caregiver suggests.   · There is increasing pain in the wound.   If you did not receive a tetanus shot today because you did not recall  "when your last one was given, check with your caregiver's office and determine if one is needed. Generally for a \"dirty\" wound, you should receive a tetanus booster if you have not had one in the last five years. If you have a \"clean\" wound, you should receive a tetanus booster if you have not had one within the last ten years.  If you have a foreign body that needs removal and this was not done today, make sure you know how you are to follow up and what is the plan of action for taking care of this. It is your responsibility to follow up on this.  MAKE SURE YOU:   · Understand these instructions.   · Will watch your condition.   · Will get help right away if you are not doing well or get worse.   Document Released: 06/13/2002 Document Revised: 03/11/2013 Document Reviewed: 08/06/2009  [employer]® Patient Information ©2013 [employer].  "

## 2018-12-17 ENCOUNTER — OFFICE VISIT (OUTPATIENT)
Dept: HEALTH INFORMATION MANAGEMENT | Facility: MEDICAL CENTER | Age: 23
End: 2018-12-17
Payer: MEDICAID

## 2018-12-17 VITALS
DIASTOLIC BLOOD PRESSURE: 80 MMHG | OXYGEN SATURATION: 92 % | BODY MASS INDEX: 44.1 KG/M2 | HEIGHT: 71 IN | HEART RATE: 89 BPM | SYSTOLIC BLOOD PRESSURE: 130 MMHG | WEIGHT: 315 LBS

## 2018-12-17 DIAGNOSIS — R53.82 CHRONIC FATIGUE: ICD-10-CM

## 2018-12-17 DIAGNOSIS — E66.01 MORBID OBESITY (HCC): ICD-10-CM

## 2018-12-17 DIAGNOSIS — G47.31 CENTRAL SLEEP APNEA: ICD-10-CM

## 2018-12-17 DIAGNOSIS — R63.2 BINGE EATING: ICD-10-CM

## 2018-12-17 PROCEDURE — 97802 MEDICAL NUTRITION INDIV IN: CPT | Performed by: DIETITIAN, REGISTERED

## 2018-12-17 PROCEDURE — 93000 ELECTROCARDIOGRAM COMPLETE: CPT | Performed by: INTERNAL MEDICINE

## 2018-12-17 PROCEDURE — 99204 OFFICE O/P NEW MOD 45 MIN: CPT | Mod: 25 | Performed by: INTERNAL MEDICINE

## 2018-12-17 RX ORDER — AMMONIUM LACTATE 12 G/100G
CREAM TOPICAL PRN
COMMUNITY
End: 2020-02-13

## 2018-12-17 RX ORDER — CARBAMAZEPINE 400 MG/1
400 TABLET, EXTENDED RELEASE ORAL 2 TIMES DAILY
Status: ON HOLD | COMMUNITY
Start: 2018-11-26 | End: 2021-10-29

## 2018-12-17 RX ORDER — ARIPIPRAZOLE 30 MG/1
30 TABLET ORAL DAILY
COMMUNITY
Start: 2018-11-26 | End: 2022-06-12

## 2018-12-17 RX ORDER — CHOLECALCIFEROL (VITAMIN D3) 50 MCG
2000 TABLET ORAL EVERY MORNING
COMMUNITY
Start: 2018-11-26 | End: 2022-04-17

## 2018-12-17 RX ORDER — KETOCONAZOLE 20 MG/G
1 CREAM TOPICAL
COMMUNITY
Start: 2018-10-02 | End: 2022-04-17

## 2018-12-17 RX ORDER — NYSTATIN 100000 U/G
OINTMENT TOPICAL
COMMUNITY
Start: 2018-11-20 | End: 2020-02-13
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
## 2018-12-17 NOTE — PROGRESS NOTES
"12/17/2018   Referring Provider: [facility] (Inactive)  Adama Brut 23 y.o.        Time in/out: 1:42-2:07pm    Anthropometrics/Objective  Vitals:    12/17/18 1306   BP: 130/80   Weight: (!) 213.7 kg (471 lb 3.2 oz)   Height: 1.803 m (5' 11\")     BMI: Body mass index is 65.72 kg/m².  Stated Goal Weight: none stated  See comprehensive patient history form for further information     Subjective:  Pt is here today for the initial screening visit for the medical weight management program.   - pt is accompanied by his  and group home staff worker  - lives in a group home with a few other men   - works at [employer] where he gets free food at work  - eats junk food throughout the day, soda, large amts of condiments, admits to large portions especially at work, has questions about which foods are healthier to eat  - active with sports, walks to work    See Medical Questionnaire for more detailed diet history     Nutrition Diagnosis (PES Statement)  · Obesity related to excessive energy intake and inadequate energy expenditure as evidenced by BMI >30     Client history:  Condition(s) associated with a diagnosis or treatment (specify) JEAN    Biochemical data, medical test and procedures  Lab Results   Component Value Date/Time    HBA1C 5.1 09/19/2015 03:32 AM     No results found for: POCGLUCOSE  No results found for: CHOLSTRLTOT, LDL, HDL, TRIGLYCERIDE      Nutrition Intervention  Nutrition Prescription  Recommended Daily Kcals: <3500 Kcal based on REE of 3908    Recommended Daily Protein: 100g or more per day per Dr. Lynn   Recommended Daily CHO: 100g or less per day per Dr. Lynn   (no discussed with pt)    Meal Plan Recommendation   Low calorie, high protein/low CHO diet with 0 meal replacements per day per Dr. Lynn     Comprehensive Nutrition Education Instruction or training leading to in-depth nutrition related knowledge about:   Benefits to following meal " plan, Combine carb, protein and fat at each meal,  Meal timing and spacing, Portion control, Sweets and alcohol in moderation.    Handouts provided regarding topics discussed:   - LCD Plan   - MyPlate   - Snack list with fruit     Monitoring & Evaluation Plan    Behavioral-Environmental:  Physical activity: Not discussed in further detail on today's visit    Food / Nutrient Intake:  Food intake: Follow meal plan as discussed. Avoid concentrated sweets and processed carbs. Use the plate method for portion control and macronutrient balance. Limit carbs/starch to up to 1 cup per meal. Snacks should be 1oz protein + ns veggies or fruit. Fruit once per day.     Fluid intake: Consume at least 64 oz water per day. Avoid all sweetened beverages. Limit coffee to 1 cup a day (ideally no cream or sugar). Avoid alcohol.      Physical Signs / Symptoms:  Weight change 1-2 lbs a week to goal or 5% weight loss within the first month per Dr. Orr      Assessment Notes:  The pt and his  and group home staff have decided they are going to help meal prep 2 days out of the week so that Adama has prepared and pre-portioned healthy meals and snacks on hand during the week. This will help him ideally to make better choices while at home although it sounds like his largest barrier is making healthier choices while at work where there is a buffet and free food. I recommended 3 meals and 3 snacks throughout the day following the plate method with 1/2 plate NS veggies, 5-6 oz protein, 1 cup or fist of carbohydrates and 2 fats per meal and 1-2 oz protein + NS veggies or a piece of fruit for a snack.     If having a snack not listed, I did briefly Adama how to read the food label and identify a portion size and to limit to that, although I feel this should be reiterated again on his next visit as well as portion sizes as his support staff states that 1 cup for him may sometimes mean 1 carton.     Adama has agreed to  start by having more options from the salad bar, limit condiments to 2 tablespoons while at work, no more than 6 chicken wings (half of his current) and no more than 2 slices of pizza (sometimes he will have a whole pizza). He has set these goals and agrees to them. No tracking or numbers were reviewed with the pt as I do not feel they are not appropriate for him at this time. The staff is going to use the snack list to purchase him healthier options and may consider getting him his own refrigerator separate from the others. Recommend continuing to set small achievable goals for Adama to help him build confidence with healthier eating habits.    F/U: 2 week w/ RD and 4-6 weeks with MD and EVELIA

## 2019-02-20 ENCOUNTER — OFFICE VISIT (OUTPATIENT)
Dept: HEALTH INFORMATION MANAGEMENT | Facility: MEDICAL CENTER | Age: 24
End: 2019-02-20
Payer: MEDICAID

## 2019-02-20 VITALS
HEIGHT: 71 IN | HEART RATE: 86 BPM | SYSTOLIC BLOOD PRESSURE: 130 MMHG | OXYGEN SATURATION: 90 % | DIASTOLIC BLOOD PRESSURE: 86 MMHG | WEIGHT: 315 LBS | BODY MASS INDEX: 44.1 KG/M2

## 2019-02-20 DIAGNOSIS — E66.01 MORBID OBESITY (HCC): ICD-10-CM

## 2019-02-20 DIAGNOSIS — R63.2 BINGE EATING: ICD-10-CM

## 2019-02-20 DIAGNOSIS — G47.31 CENTRAL SLEEP APNEA: ICD-10-CM

## 2019-02-20 DIAGNOSIS — R53.82 CHRONIC FATIGUE: ICD-10-CM

## 2019-02-20 PROCEDURE — 97803 MED NUTRITION INDIV SUBSEQ: CPT | Performed by: DIETITIAN, REGISTERED

## 2019-02-20 PROCEDURE — 99214 OFFICE O/P EST MOD 30 MIN: CPT | Performed by: INTERNAL MEDICINE

## 2019-02-20 NOTE — PROGRESS NOTES
"Bariatric Medicine Follow Up  Chief Complaint   Patient presents with   • Weight Gain       History of Present Illness:   Adama Burt is a 23 y.o. male who presents for weight management follow-up with his  and to help address co-morbidities related to overweight, including JEAN, fatigue.    During the patient's last visit, the following were discussed and recommended:  Weight Goal: 5% wt loss at one month after start (pt goal weight is 200 lb)  Diet:     High Protein/Low Carb Meals and 2 snacks between meals daily  >100 g protein, <100 g total carbs daily if tracking  64+ oz water per day  Avoid sweet drinks and sodas  Track daily intake if tracking  Physical Activity: Continue team sports as he is currently doing  New Rx:   Consider anti-obesity medication pending course  Behavior change:   Mindful eating  Follow-up: one month with MD, 2 wks with RD  Refer to bariatric surgery after 10% weight loss    Having more Lean Cuisine, helps with portion control.  Not eating much for breakfast.  Not snacking much in between meals.  Eating less pizza but eating more cheese, sub instead of pizza.  Portions reduced!  Trying to have more vegetables or fruit when he can.    Sleep much improved.  Less fatigue.    Exercise:   Team sports, just started practice again with basketball, bowling  Walking stairs at girlfriend's house     Review of Systems   Denies insomnia.  All other ROS were reviewed and are otherwise unchanged from my previous visit with patient.    Physical Exam:    /86 (BP Location: Left arm, Patient Position: Sitting, BP Cuff Size: Large adult)   Pulse 86   Ht 1.803 m (5' 11\")   Wt (!) 209.7 kg (462 lb 6.4 oz)   SpO2 90%   BMI 64.49 kg/m²      Weight change since last visit:  -9 lb   Waist Circum change since last visit:  -1.5 in    Constitutional: Oriented to person, place, and time and well-developed, well-nourished, and in no distress.    Head: Normocephalic. "   Musculoskeletal: Normal range of motion. No edema.   Neurological: Alert and oriented to person, place, and time. No muscle weakness.  Gait normal.   Skin: Warm and dry. Not diaphoretic.   Psychiatric: Mood, memory, affect and judgment normal.     Laboratory:   Recent labs reviewed.      Dietitian Assessment: I have reviewed the Dietitian's assessment related to this encounter.       ASSESSMENT/PLAN:  Body mass index is 64.49 kg/m².    Obesity Stage ([place]):  2; Class 3    1. Binge eating     2. Chronic fatigue     3. Morbid obesity (HCC)     4. Central sleep apnea       Adama has begun to make some positive changes, really watching portion sizes and making better food substitutions.  Binge eating has improved, as have sleep and fatigue.  Consider anti-obesity medication if binge eating worsens.  Referral to bariatric surgery after 10% weight loss.    The patient and I have discussed at length and agree to the following recommendations, which are all addressing the above diagnoses:    Weight Goal: 3-5% wt loss each month (pt goal weight is 200 lb)  Diet: High Protein/Low Carb Meals and 2 snacks between meals daily  Given handouts on carbs to avoid, low carb meal plan and snack ideas  Often skipping breakfast  64+ oz water per day  Avoid sweet drinks and sodas, fast food  Physical Activity: Increase walking, playing sports 1-2 times per week  Risk level for moderate/vigorous exercise program: Low  New Rx: None  Behavior change: Portion control, mindful eating, increase activity  Follow-up: 1 mo  Refer to bariatric surgery with 10% weight loss    Patient's body mass index is 64.49 kg/m². Exercise and nutrition counseling were performed at this visit.

## 2019-02-20 NOTE — PROGRESS NOTES
"Nutrition Reassess: Medical Weight Management   Today's date: 2/20/2019  Referring Provider: No ref. provider found      Time: in/out 10:13-10:27am   Visit#: 2     Subjective:  -Pt is here today with one of his caretakers   -He lives in a group home where other before shop and prepare most of his meals   -He doesn't typically eat breakfast; occasionally has cereal   -Has been eating Lean Cuisine for lunch which he likes but doesn't keep him very full   -Has cut back on soda tremendously - gets water or sparkling water instead   -No longer working at [employer], so is avoiding pizza and wings   -Drinks a lot of milk; switch to fat free but drinks 5 or so cups a day throughout the day    -Pt doesn't feel he can afford protein drinks     Anthropometrics/Objective  Today's weight:    Vitals:    02/20/19 0942   BP: 130/86   Weight: (!) 209.7 kg (462 lb 6.4 oz)   Height: 1.803 m (5' 11\")     BMI:  Body mass index is 64.49 kg/m².  Starting weight/date 471.3lb     Total weight change : -8.9lb            Meal Plan:   High protein, Carb controlled diet with 0 meal replacements per day     ReAssesment/Notes:  Natanael is pleased he has lost 9lb thus far. He is eating less calorically dense foods and drinking much less soda. Because pt has a lot to improve in his diet and given his lifestyle and living situation we focused on specific changes he can make to his diet, building off of the recommendations from the previous RD. Dr. Orr gave pt meal ideas handout, snack handout, and carbs to avoid list. Explained that this is a list to help show what healthier choices are of carbs, but that portion control will be the best place to start. It is unrealistic for him to completely avoid carbs in his diet. Therefore we set the following goals...    Goals:   1. Continue to reduce soda to eventually eliminate   2. Start eating a high protein breakfast (hb eggs, greek yogurt etc)   3. Drink less milk (3 cups per day or less)   4. " Continue Lean Cuisine for lunch ; add more veggies or protein if needed   5. High protein snacks (tuna, chicken salad made with plain yogurt, string cheese, with veggies etc).       Follow-up: 4 weeks with MD and EVELIA

## 2019-03-18 ENCOUNTER — OFFICE VISIT (OUTPATIENT)
Dept: HEALTH INFORMATION MANAGEMENT | Facility: MEDICAL CENTER | Age: 24
End: 2019-03-18
Payer: MEDICAID

## 2019-03-18 VITALS
HEIGHT: 71 IN | DIASTOLIC BLOOD PRESSURE: 72 MMHG | HEART RATE: 84 BPM | SYSTOLIC BLOOD PRESSURE: 130 MMHG | OXYGEN SATURATION: 92 % | BODY MASS INDEX: 44.1 KG/M2 | WEIGHT: 315 LBS

## 2019-03-18 DIAGNOSIS — E66.01 MORBID OBESITY (HCC): ICD-10-CM

## 2019-03-18 DIAGNOSIS — G47.31 CENTRAL SLEEP APNEA: ICD-10-CM

## 2019-03-18 DIAGNOSIS — E66.01 CLASS 3 SEVERE OBESITY DUE TO EXCESS CALORIES WITH SERIOUS COMORBIDITY AND BODY MASS INDEX (BMI) GREATER THAN OR EQUAL TO 70 IN ADULT (HCC): ICD-10-CM

## 2019-03-18 DIAGNOSIS — R63.2 BINGE EATING: ICD-10-CM

## 2019-03-18 DIAGNOSIS — R53.82 CHRONIC FATIGUE: ICD-10-CM

## 2019-03-18 PROCEDURE — 99214 OFFICE O/P EST MOD 30 MIN: CPT | Performed by: INTERNAL MEDICINE

## 2019-03-18 PROCEDURE — 97803 MED NUTRITION INDIV SUBSEQ: CPT | Performed by: DIETITIAN, REGISTERED

## 2019-03-18 NOTE — PROGRESS NOTES
"Nutrition Reassess: Medical Weight Management   Today's date: 3/18/2019  Referring Provider: Stalin Mejia MD     Time: in/out 11:18-11:31am   Visit#: 3     Subjective:  -Pt states he has been exercising more with [employer] and bowling   -Exercising 2-3 times per week    -Has not been drinking as much soda or milk   -Eats hard boiled eggs for breakfast or cereal   -Much less fast food   -Caregivers are cooking healthier meals     Anthropometrics/Objective  Today's weight:    Vitals:    03/18/19 1057   BP: 130/72   Weight: (!) 206.4 kg (455 lb)   Height: 1.803 m (5' 11\")     BMI:  Body mass index is 63.46 kg/m².  Starting weight/date 471.2lb     Total weight change : -16.2lb            Meal Plan:   High protein, Carb controlled diet   with 0 meal replacements per day     ReAssesment/Notes:  Adama has done very well with portion control and improving his food choices. He is also not longer drinking as many calories from soda or milk. He is enjoying exercising with the [employer] and was encouraged to continue to exercise and walk more (goal is 3 x per week).     Follow-up: 4 weeks   "

## 2019-04-29 ENCOUNTER — OFFICE VISIT (OUTPATIENT)
Dept: HEALTH INFORMATION MANAGEMENT | Facility: MEDICAL CENTER | Age: 24
End: 2019-04-29
Payer: MEDICAID

## 2019-04-29 VITALS
WEIGHT: 315 LBS | BODY MASS INDEX: 44.1 KG/M2 | HEIGHT: 71 IN | DIASTOLIC BLOOD PRESSURE: 80 MMHG | SYSTOLIC BLOOD PRESSURE: 130 MMHG | HEART RATE: 75 BPM | OXYGEN SATURATION: 100 %

## 2019-04-29 DIAGNOSIS — E78.1 HYPERTRIGLYCERIDEMIA: ICD-10-CM

## 2019-04-29 DIAGNOSIS — R53.82 CHRONIC FATIGUE: ICD-10-CM

## 2019-04-29 DIAGNOSIS — R63.5 WEIGHT GAIN: ICD-10-CM

## 2019-04-29 DIAGNOSIS — R63.2 BINGE EATING: ICD-10-CM

## 2019-04-29 DIAGNOSIS — E66.01 CLASS 3 SEVERE OBESITY DUE TO EXCESS CALORIES WITH SERIOUS COMORBIDITY AND BODY MASS INDEX (BMI) OF 50.0 TO 59.9 IN ADULT (HCC): ICD-10-CM

## 2019-04-29 DIAGNOSIS — E66.01 MORBID OBESITY (HCC): ICD-10-CM

## 2019-04-29 DIAGNOSIS — E78.00 HYPERCHOLESTEROLEMIA: ICD-10-CM

## 2019-04-29 DIAGNOSIS — G47.31 CENTRAL SLEEP APNEA: ICD-10-CM

## 2019-04-29 PROCEDURE — 99214 OFFICE O/P EST MOD 30 MIN: CPT | Performed by: INTERNAL MEDICINE

## 2019-04-29 PROCEDURE — 97803 MED NUTRITION INDIV SUBSEQ: CPT | Performed by: DIETITIAN, REGISTERED

## 2019-04-29 RX ORDER — METFORMIN HYDROCHLORIDE 750 MG/1
750 TABLET, EXTENDED RELEASE ORAL DAILY
Qty: 30 TAB | Refills: 1 | Status: SHIPPED | OUTPATIENT
Start: 2019-04-29 | End: 2019-07-02 | Stop reason: SDUPTHER

## 2019-04-29 NOTE — PROGRESS NOTES
Bariatric Medicine Follow Up  Chief Complaint   Patient presents with   • Weight Gain       History of Present Illness:   Adama Burt is a 23 y.o. male who presents for weight management follow-up with 3 caregivers and to help address co-morbidities related to overweight, including JEAN, fatigue.    During the patient's last visit, the following were discussed and recommended:  Weight Goal: 3-5% wt loss each month (pt goal weight is 200 lb)  Diet: High Protein/Low Carb Meals and 2 snacks between meals daily  64+ oz water per day  Avoid sweet drinks and sodas, fast food  Track daily intake with written journal if weight loss plateau  Physical Activity:  Basketball, bowling, track 2-3 d/wk at least  Risk level for moderate/vigorous exercise program: low  New Rx: None  Behavior change: Mindful eating, increase activity  Follow-up: 6 wk    The patient is here today with his care coordinator, as well as the woman who cooks his meals.  He has been overeating, often cooking a meal after she has prepared and he has eaten his regular meals.  Over the last 2 weeks, he has been more agitated, is eating more food.  He has been exercising less as well.  He does not feel he can control his eating.    After discussing anti-obesity medication, he is unable to afford them.  He would be willing to try metformin, for psychotropic-induced weight gain.  He would also be willing to consider bariatric surgery, if he would qualify and could afford it.    Fatigue level, sleep unchanged.  Mood has been more agitated, with no recent change to his psychotropics.  Continues vitamin D repletion.    Exercise:   Basketball, track 2 to 3 days/week, no recent bowling     Review of Systems   Agitation at times, anxious mood.  Denies worsening fatigue, lightheadedness.  All other ROS were reviewed and are otherwise unchanged from my previous visit with patient.    Physical Exam:    /80 (BP Location: Left arm, Patient Position:  "Sitting, BP Cuff Size: Large adult)   Pulse 75   Ht 1.803 m (5' 11\")   Wt (!) 216.2 kg (476 lb 9.6 oz)   SpO2 100%   BMI 66.47 kg/m²   Waist Measurement   Waist: 69 inch/inches  Weight change since last visit: +21 lb (+5 lb total)  Waist Circum change since last visit: +2 in (0 in total)    Constitutional: Oriented to person, place, and time and well-developed, well-nourished, and in no distress.    Head: Normocephalic.   Musculoskeletal: Normal range of motion. No edema.   Neurological: Alert and oriented to person, place, and time. No muscle weakness.  Gait normal.   Skin: Warm and dry. Not diaphoretic.   Psychiatric: Mood, memory, affect and judgment normal.     Laboratory:   Recent labs reviewed (4/29/2019).      Dietitian Assessment: I have reviewed the Dietitian's assessment related to this encounter.       ASSESSMENT/PLAN:  Body mass index is 66.47 kg/m².    Obesity Stage ([place]): 2; Class 3    1. Binge eating     2. Chronic fatigue     3. Central sleep apnea     4. Morbid obesity (HCC)  metFORMIN ER (GLUCOPHAGE XR) 750 MG TABLET SR 24 HR    REFERRAL TO BARIATRIC SURGERY   5. Weight gain     6. Hypercholesterolemia     7. Hypertriglyceridemia       The patient is struggling with weight gain, unable to control his eating.  Cannot afford anti-obesity medication, but will start metformin given probable psychotropic-induced weight gain, metabolic syndrome.  Recent labs show hypertriglyceridemia, hyperlipidemia, likely from excess refined CHO intake.  Given patient's sleep apnea, BMI, and inability to take most available anti-obesity medications, strongly consider bariatric surgery; referral given.    The patient and I have discussed at length and agree to the following recommendations, which are all addressing the above diagnoses:    Weight Goal: 3-5% wt loss each month (pt goal weight is 200 lb)  Diet: Continue lower carb, higher protein diet  RD discussed today with the house   Letter given to " restrict access to kitchen after meals and after 6 PM  Physical Activity: Continue basketball, bowling, walking  Risk level for moderate/vigorous exercise program: Moderate given BMI  New Rx: Metformin  Follow-up: 1 month    Patient's body mass index is 66.47 kg/m². Exercise and nutrition counseling were performed at this visit.

## 2019-04-29 NOTE — LETTER
April 29, 2019        Re:  Adama Burt        To Whom It May Concern,    For medical reasons, access to the kitchen needs to be restricted after meals and after 6 pm for my patient, Mr. Burt.    Sincerely,          Mari Orr MD, FACP, [facility]    Medical Weight Management  [facility]  [address], [address]  [place], [place] [address]                        Mari Orr

## 2019-04-29 NOTE — PROGRESS NOTES
"Nutrition Reassess: Medical Weight Management   Today's date: 4/29/2019  Referring Provider: No ref. provider found      Time: in/out 1038-10:52am   Visit#: 4    Subjective:  -Pt has been active with the [employer]   -He has been trying to eat less fast food and drink less soda. Does choose diet soda more often than regular.   -He has been snacking after dinner on things like peanut butter filled pretzels   -After finishing a meal he will go prepare another meal   -His caregiver admits she is giving him large portions on his plate and will do better with this       Anthropometrics/Objective  Today's weight:    Vitals:    04/29/19 1011   BP: 130/80   Weight: (!) 216.2 kg (476 lb 9.6 oz)   Height: 1.803 m (5' 11\")     BMI:  Body mass index is 66.47 kg/m².  Starting weight/date 471.2lb     Total weight change :  +5.4lb; He has gained back the 20lb he list and more           Meal Plan:   High protein low carb calorie controlled   with 0 meal replacements per day     ReAssesment/Notes:  Adama has gained back the 20lb he had lost. He has been eating excess calories from snacks (especially at night) and eating additional meals in between. Today we discussed goals for helping to reduce unnecessary calorie intake. He is agreeable to the following goals and the caregiver will assist with implementing them as well.     Goals:   1. Smaller portions; no going back for 2nds or making a meal after already eating. Use plate method when serving his meals   2. Limit snacks to just x2 per day (healthy, from list, high protein)   3. No eating after dinner   4. Switch to unsweetened almond milk   5. Ok to have diet soda instead of regular soda        Follow-up: 5 weeks with MD and RD    "

## 2019-05-30 ENCOUNTER — OFFICE VISIT (OUTPATIENT)
Dept: HEALTH INFORMATION MANAGEMENT | Facility: MEDICAL CENTER | Age: 24
End: 2019-05-30
Payer: MEDICAID

## 2019-05-30 VITALS
HEIGHT: 71 IN | SYSTOLIC BLOOD PRESSURE: 128 MMHG | WEIGHT: 315 LBS | HEART RATE: 86 BPM | OXYGEN SATURATION: 90 % | BODY MASS INDEX: 44.1 KG/M2 | DIASTOLIC BLOOD PRESSURE: 74 MMHG

## 2019-05-30 DIAGNOSIS — R63.5 WEIGHT GAIN: ICD-10-CM

## 2019-05-30 DIAGNOSIS — R53.82 CHRONIC FATIGUE: ICD-10-CM

## 2019-05-30 DIAGNOSIS — E66.01 MORBID OBESITY (HCC): ICD-10-CM

## 2019-05-30 DIAGNOSIS — R63.2 BINGE EATING: ICD-10-CM

## 2019-05-30 DIAGNOSIS — E78.1 HYPERTRIGLYCERIDEMIA: ICD-10-CM

## 2019-05-30 DIAGNOSIS — E78.00 HYPERCHOLESTEROLEMIA: ICD-10-CM

## 2019-05-30 DIAGNOSIS — G47.31 CENTRAL SLEEP APNEA: ICD-10-CM

## 2019-05-30 PROCEDURE — 97803 MED NUTRITION INDIV SUBSEQ: CPT | Performed by: DIETITIAN, REGISTERED

## 2019-05-30 PROCEDURE — 99214 OFFICE O/P EST MOD 30 MIN: CPT | Performed by: INTERNAL MEDICINE

## 2019-05-30 NOTE — PROGRESS NOTES
Bariatric Medicine Follow Up  Chief Complaint   Patient presents with   • Weight Gain       History of Present Illness:   Adama Burt is a 23 y.o. male who presents for weight management follow-up with his  and to help address co-morbidities caused by overweight, as below.    During the patient's last visit, the following were discussed and recommended:  Weight Goal: 3-5% wt loss each month (pt goal weight is 200 lb)  Diet: Continue lower carb, higher protein diet  RD discussed today with the house   Letter given to restrict access to kitchen after meals and after 6 PM  Physical Activity: Continue basketball, bowling, walking  Risk level for moderate/vigorous exercise program: Moderate given BMI  New Rx: Metformin  Follow-up: 1 month    The patient realizes he has gained weight, although he finds he is trying to eat better.  He does not have access to food after dinner according to his  who is present today.  He is overeating about 5 times per month instead of almost every night.  He is trying to walk more.  Having some lean cuisine.  Unable to track but willing to take photos if possible of what he is eating.    HLD/TG: Lipids controlled off statin  JEAN: Sleep stable  Chronic fatigue: Unchanged    Exercise:   Lots of activities with [employer] including running     Review of Systems   Denies worsening fatigue, change in BMs or dyspnea on exertion  All other ROS were reviewed and are otherwise unchanged from my previous visit with patient.    Physical Exam:    There were no vitals taken for this visit.     Weight change since last visit: Change 0 lb (+6 lb total)  Waist Circum change since last visit: 0 in (0 in total)    Constitutional: Oriented to person, place, and time and well-developed, well-nourished, and in no distress.    Head: Normocephalic.   Musculoskeletal: Normal range of motion. No edema.   Neurological: Alert and oriented to person, place,  and time. No muscle weakness.  Gait normal.   Skin: Warm and dry. Not diaphoretic.   Psychiatric: Mood, memory, affect and judgment normal.     Laboratory:   Recent labs reviewed.      Dietitian Assessment: I have reviewed the Dietitian's assessment related to this encounter.     ASSESSMENT/PLAN:  There is no height or weight on file to calculate BMI.    Obesity Stage ([place]): 2; Class 3    1. Hypertriglyceridemia     2. Hypercholesterolemia     3. Weight gain     4. Binge eating     5. Chronic fatigue     6. Central sleep apnea     7. Morbid obesity (HCC)       The patient has yet to make significant lifestyle changes.  Have set some goals for next visit, which she needs to work on.  Would benefit from anti-obesity medication but unable to afford.  Continue to monitor lipid levels, fatigue, sleep which should improve with weight loss but he continues to gain at this point.  Binge eating would be most improved by anti-obesity medication if he could take.  Work on carbohydrate reduction which may help some.    The patient and I have discussed at length and agree to the following recommendations, which are all addressing the above diagnoses:    Weight Goal: 3-5% wt loss each month (pt goal weight is 200 lb)  Diet: Lean cuisine at least 1 daily  Increase vegetable intake  Take photos of what he is eating, bring in next time  Goal is over eating only 3 meals per month instead of 5  Physical Activity: Increase walking to 3 times per day when anxiety and wanting to overeat  Also participating in [employer]  Risk level for moderate/vigorous exercise program: Low  New Rx: None.  Strongly consider anti-obesity medication pending course  Is on metformin for medication-induced weight gain but no improvement yet  Behavior change: More mindful eating, increasing activity level  Follow-up: 6 wks    Patient's body mass index is unknown because there is no height or weight on file. Exercise and nutrition counseling were  performed at this visit.

## 2019-05-31 NOTE — PROGRESS NOTES
"Nutrition Reassess: Medical Weight Management   Today's date: 5/31/2019  Referring Provider: Andre Ovalle M.D.      Time: in/out 2:41-3:03  Visit#: 5    Subjective:   - Adama Burt is here with his    - He has been recently been eating breakfast, which is different for him. In the past he wasn't eating breakfast because he didn't want to clean up afterwards. Staff bought him Premier Protein but Adama isn't drinking them   - Adama has been better at eating smaller portions, due to the staff plating his food. He has gone back for seconds less, but still does. Especially when everyone else does, this is hard for him   - He doesn't like the almond milk. Wants to drink skim instead. His caretaker tells me though, the problem is he will drink too much of it because he likes it   - He has had no soda!   - Going to bed instead of snacking at night   - He is setting a goal for himself to walk 3 x day for exercise    - Reports never feeling physiologically hungry    Anthropometrics/Objective  Today's weight:    Vitals:    05/30/19 1712   BP: 128/74   Weight: (!) 216.5 kg (477 lb 6.4 oz)   Height: 1.803 m (5' 11\")     BMI:  Body mass index is 66.58 kg/m².  Starting weight/date 471.2lb        Total weight change : + 5.1 lb            Meal Plan:   Lower CHO / higher protein / calorie controlled diet per Dr. Orr with 0 meal replacements per day     ReAssesment/Notes:  Adama has done better with limited control over his foods, giving control more to the group home staff. Suggested this a good strategy going forward, with snacks and seconds as well. Essentially, only eating what he is given. We compromised on the milk, and he will go back to skim milk, but will drink this only out of a designated, smaller volume cup and only 1-2 times a day. Discussed asking himself, \"is my mouth hungry or is my stomach hungry\" before eating anything outside of breakfast, lunch and dinner. This might be " tricky for him, as he reports almost always wanting the food, but not knowing how to tell if his stomach is hungry, but I do feel this might be a good think for him to start practicing and thinking about. He will also work towards the goal of additional intentional physical activity.    Follow-up: 1 month nii PÉREZ and EVELIA

## 2019-07-02 DIAGNOSIS — E66.01 MORBID OBESITY (HCC): ICD-10-CM

## 2019-07-02 RX ORDER — METFORMIN HYDROCHLORIDE 750 MG/1
750 TABLET, EXTENDED RELEASE ORAL DAILY
Qty: 30 TAB | Refills: 1 | Status: SHIPPED | OUTPATIENT
Start: 2019-07-02 | End: 2019-08-23 | Stop reason: SDUPTHER

## 2019-08-23 DIAGNOSIS — E66.01 MORBID OBESITY (HCC): ICD-10-CM

## 2019-08-26 ENCOUNTER — APPOINTMENT (OUTPATIENT)
Dept: HEALTH INFORMATION MANAGEMENT | Facility: MEDICAL CENTER | Age: 24
End: 2019-08-26
Payer: MEDICAID

## 2019-08-26 VITALS
OXYGEN SATURATION: 91 % | SYSTOLIC BLOOD PRESSURE: 126 MMHG | DIASTOLIC BLOOD PRESSURE: 70 MMHG | HEART RATE: 90 BPM | HEIGHT: 71 IN | BODY MASS INDEX: 44.1 KG/M2 | WEIGHT: 315 LBS

## 2019-08-26 DIAGNOSIS — R63.5 WEIGHT GAIN: ICD-10-CM

## 2019-08-26 DIAGNOSIS — E66.01 MORBID OBESITY (HCC): ICD-10-CM

## 2019-08-26 DIAGNOSIS — G47.31 CENTRAL SLEEP APNEA: ICD-10-CM

## 2019-08-26 DIAGNOSIS — R63.2 BINGE EATING: ICD-10-CM

## 2019-08-26 DIAGNOSIS — E78.00 HYPERCHOLESTEROLEMIA: ICD-10-CM

## 2019-08-26 DIAGNOSIS — R53.82 CHRONIC FATIGUE: ICD-10-CM

## 2019-08-26 DIAGNOSIS — E78.1 HYPERTRIGLYCERIDEMIA: ICD-10-CM

## 2019-08-26 PROCEDURE — 99214 OFFICE O/P EST MOD 30 MIN: CPT | Performed by: INTERNAL MEDICINE

## 2019-08-26 NOTE — PROGRESS NOTES
Bariatric Medicine Follow Up  Chief Complaint   Patient presents with   • Weight Gain       History of Present Illness:   Adama Burt is a 24 y.o. male who presents for weight management follow-up with his caregiver, , and to help address co-morbidities caused by overweight, as below.    During the patient's last visit, the following were discussed and recommended:  Weight Goal: 3-5% wt loss each month (pt goal weight is 200 lb)  Diet: Lean cuisine at least 1 daily  Increase vegetable intake  Take photos of what he is eating, bring in next time  Goal is over eating only 3 meals per month instead of 5  Physical Activity: Increase walking to 3 times per day when anxiety and wanting to overeat  Also participating in [employer]  Risk level for moderate/vigorous exercise program: Low  New Rx: None.  Strongly consider anti-obesity medication pending course  Is on metformin for medication-induced weight gain but no improvement yet  Behavior change: More mindful eating, increasing activity level  Follow-up: 6 wks    Moved, more independent living.  He really likes where he is living now.  Food locked up so less access to eating all day.  He is eating sausages in the morning, corn dogs at lunch, more fruit.  He does not always like what is being served for dinner, then he skips dinner.  He would be interested in trying lean cuisine for 3 meals a day, then alternating with 2 lean cuisine's per day and a regular dinner.    JEAN: Sleep stable  HLD/TG: Lipids had been elevated, not currently on statin or fenofibrate    Exercise:   Walking daily  He estimates he has been walking much more, also playing in tournaments, bowling and generally active throughout his day     Review of Systems   Denies lightheadedness, worsening fatigue or insomnia, diaphoresis  All other ROS were reviewed and are otherwise unchanged from my previous visit with patient.    Physical Exam:    /70 (BP Location: Left  "arm, Patient Position: Sitting, BP Cuff Size: Large adult)   Pulse 90   Ht 1.803 m (5' 11\")   Wt (!) 204.4 kg (450 lb 9.6 oz)   SpO2 91%   BMI 62.85 kg/m²   Waist Measurement   Waist: 67.25 inch/inches  Weight change since last visit: -17 lb (-11 lb total)  Waist Circum change since last visit: -2 in (-1.5 in total)    Constitutional: Oriented to person, place, and time and well-developed, well-nourished, and in no distress.    Head: Normocephalic.   Musculoskeletal: Normal range of motion. No edema.   Neurological: Alert and oriented to person, place, and time. No muscle weakness.  Gait normal.   Skin: Warm and dry. Not diaphoretic.   Psychiatric: Mood, memory, affect and judgment normal.     Laboratory:   Recent labs reviewed.      Dietitian Assessment: Patient defers today    ASSESSMENT/PLAN:  Body mass index is 62.85 kg/m².    Obesity Stage ([place]): 2; Class 3    1. Binge eating     2. Weight gain     3. Hypercholesterolemia  Lipid Profile   4. Hypertriglyceridemia  Lipid Profile   5. Chronic fatigue  Basic Metabolic Panel   6. Central sleep apnea     7. Morbid obesity (HCC)       The patient has finally begun to reverse some of his weight gain, binge eating less.  Has restrictions on when and what he can eat, which is helping him.  Also on metformin, seems to be curbing his appetite some.  Update lipids, BMP given hypercholesterolemia, chronic fatigue.  Sleep stable.  Continue increasing activity as he is doing.  Continue stimulus narrowing, with fewer food choices.  Consider anti-obesity medication pending course.  Patient's insurance does not cover bariatric surgery.    The patient and I have discussed at length and agree to the following recommendations, which are all addressing the above diagnoses:    Weight Goal: 3-5% wt loss each month (pt goal weight is 200 lb)  Diet: 3 lean cuisine per day or to lean cuisine's and one whole food dinner  Go with his  to buy snacks so he makes better " choices  Physical Activity: Increase walking, bowling, basketball leaks as he is doing  Risk level for moderate/vigorous exercise program: Moderate given BMI  New Rx: Continue metformin 750, consider anti-obesity medication pending course  Behavior change: Increase walking, limited access to food where he lives  Follow-up: 3 mo  Lipids, BMP prior to next visit    Patient's body mass index is 62.85 kg/m². Exercise and nutrition counseling were performed at this visit.

## 2019-08-27 RX ORDER — METFORMIN HYDROCHLORIDE 750 MG/1
TABLET, EXTENDED RELEASE ORAL
Qty: 30 TAB | Refills: 0 | Status: SHIPPED | OUTPATIENT
Start: 2019-08-27 | End: 2019-09-23 | Stop reason: SDUPTHER

## 2019-09-05 ENCOUNTER — HOSPITAL ENCOUNTER (OUTPATIENT)
Dept: LAB | Facility: MEDICAL CENTER | Age: 24
End: 2019-09-05
Attending: INTERNAL MEDICINE
Payer: MEDICAID

## 2019-09-05 DIAGNOSIS — E78.1 HYPERTRIGLYCERIDEMIA: ICD-10-CM

## 2019-09-05 DIAGNOSIS — R53.82 CHRONIC FATIGUE: ICD-10-CM

## 2019-09-05 DIAGNOSIS — E78.00 HYPERCHOLESTEROLEMIA: ICD-10-CM

## 2019-09-05 LAB
ANION GAP SERPL CALC-SCNC: 9 MMOL/L (ref 0–11.9)
BUN SERPL-MCNC: 16 MG/DL (ref 8–22)
CALCIUM SERPL-MCNC: 9.4 MG/DL (ref 8.5–10.5)
CHLORIDE SERPL-SCNC: 104 MMOL/L (ref 96–112)
CHOLEST SERPL-MCNC: 206 MG/DL (ref 100–199)
CO2 SERPL-SCNC: 26 MMOL/L (ref 20–33)
CREAT SERPL-MCNC: 0.94 MG/DL (ref 0.5–1.4)
FASTING STATUS PATIENT QL REPORTED: NORMAL
GLUCOSE SERPL-MCNC: 86 MG/DL (ref 65–99)
HDLC SERPL-MCNC: 57 MG/DL
LDLC SERPL CALC-MCNC: 101 MG/DL
POTASSIUM SERPL-SCNC: 4.1 MMOL/L (ref 3.6–5.5)
SODIUM SERPL-SCNC: 139 MMOL/L (ref 135–145)
TRIGL SERPL-MCNC: 242 MG/DL (ref 0–149)

## 2019-09-05 PROCEDURE — 36415 COLL VENOUS BLD VENIPUNCTURE: CPT

## 2019-09-05 PROCEDURE — 80048 BASIC METABOLIC PNL TOTAL CA: CPT

## 2019-09-05 PROCEDURE — 80061 LIPID PANEL: CPT

## 2019-09-23 DIAGNOSIS — E66.01 MORBID OBESITY (HCC): ICD-10-CM

## 2019-09-30 ENCOUNTER — TELEPHONE (OUTPATIENT)
Dept: HEALTH INFORMATION MANAGEMENT | Facility: MEDICAL CENTER | Age: 24
End: 2019-09-30

## 2019-09-30 RX ORDER — METFORMIN HYDROCHLORIDE 750 MG/1
TABLET, EXTENDED RELEASE ORAL
Qty: 30 TAB | Refills: 0 | Status: SHIPPED | OUTPATIENT
Start: 2019-09-30 | End: 2019-11-18 | Stop reason: SDUPTHER

## 2019-11-18 ENCOUNTER — OFFICE VISIT (OUTPATIENT)
Dept: HEALTH INFORMATION MANAGEMENT | Facility: MEDICAL CENTER | Age: 24
End: 2019-11-18
Payer: MEDICAID

## 2019-11-18 VITALS
DIASTOLIC BLOOD PRESSURE: 80 MMHG | OXYGEN SATURATION: 92 % | HEIGHT: 71 IN | HEART RATE: 87 BPM | WEIGHT: 315 LBS | BODY MASS INDEX: 44.1 KG/M2 | SYSTOLIC BLOOD PRESSURE: 130 MMHG

## 2019-11-18 DIAGNOSIS — E78.1 HYPERTRIGLYCERIDEMIA: ICD-10-CM

## 2019-11-18 DIAGNOSIS — R63.2 BINGE EATING: ICD-10-CM

## 2019-11-18 DIAGNOSIS — E78.00 HYPERCHOLESTEROLEMIA: ICD-10-CM

## 2019-11-18 DIAGNOSIS — G47.31 CENTRAL SLEEP APNEA: ICD-10-CM

## 2019-11-18 DIAGNOSIS — E66.01 MORBID OBESITY (HCC): ICD-10-CM

## 2019-11-18 DIAGNOSIS — R53.82 CHRONIC FATIGUE: ICD-10-CM

## 2019-11-18 PROCEDURE — 99214 OFFICE O/P EST MOD 30 MIN: CPT | Performed by: INTERNAL MEDICINE

## 2019-11-18 RX ORDER — METFORMIN HYDROCHLORIDE 750 MG/1
TABLET, EXTENDED RELEASE ORAL
Qty: 30 TAB | Refills: 2 | Status: SHIPPED | OUTPATIENT
Start: 2019-11-18 | End: 2022-06-12

## 2019-11-18 ASSESSMENT — PATIENT HEALTH QUESTIONNAIRE - PHQ9
5. POOR APPETITE OR OVEREATING: 1 - SEVERAL DAYS
CLINICAL INTERPRETATION OF PHQ2 SCORE: 3
SUM OF ALL RESPONSES TO PHQ QUESTIONS 1-9: 5

## 2019-11-18 NOTE — PROGRESS NOTES
"Bariatric Medicine Follow Up  Chief Complaint   Patient presents with   • Weight Gain       History of Present Illness:   Adama Burt is a 24 y.o. male who presents for weight management follow-up and to help address co-morbidities caused by overweight, as below.    During the patient's last visit, the following were discussed and recommended:  Weight Goal: 3-5% wt loss each month (pt goal weight is 200 lb)  Diet: 3 lean cuisine per day or to lean cuisine's and one whole food dinner  Go with his  to buy snacks so he makes better choices  Physical Activity: Increase walking, bowling, basketball leaks as he is doing  Risk level for moderate/vigorous exercise program: Moderate given BMI  New Rx: Continue metformin 750, consider anti-obesity medication pending course  Behavior change: Increase walking, limited access to food where he lives    He could not continue has job b/c they wanted him to work late at night.  He liked the [employer] work.  Having chicken and vegetables for dinner.  No recent breakfast b/c does not have food to eat.  Has 1-2 Lean Cuisine per day when he can afford them.  He has been trying to eat healthier, smaller portion sizes overall.  He has not been eating a lot b/c needs someone to take him shopping.      JEAN: sleep stable  IFG:  Last fasting glucose normal on metformin, resumed taking  HLD/TG:  TG elevated on recent labs  VDD:  Taking Vit D daily    Exercise:   Still participating in several sports and walking     Review of Systems   Denies lightheadedness, worsening fatigue, insomnia, weakness  All other ROS were reviewed and are otherwise unchanged from my previous visit with patient.    Physical Exam:    /80 (BP Location: Left arm, Patient Position: Sitting, BP Cuff Size: Large adult long)   Pulse 87   Ht 1.803 m (5' 11\")   Wt (!) 197.3 kg (435 lb)   SpO2 92%   BMI 60.67 kg/m²   Waist Measurement   Waist: 67.25 inch/inches  Weight change since last " visit: -15 lb (-36 lb total)  Waist Circum change since last visit: 0 in (-1 in total)    Constitutional: Oriented to person, place, and time and well-developed, well-nourished, and in no distress.    Head: Normocephalic.   Musculoskeletal: Normal range of motion. No edema.   Neurological: Alert and oriented to person, place, and time. No muscle weakness.  Gait normal.   Skin: Warm and dry. Not diaphoretic.   Psychiatric: Mood, memory, affect and judgment normal.     Laboratory:   Recent labs reviewed.      Dietitian Assessment: None today.     ASSESSMENT/PLAN:  Body mass index is 60.67 kg/m².    Obesity Stage ([place]): 2; Class 3    1. Morbid obesity (HCC)     2. Chronic fatigue     3. Binge eating     4. Hypercholesterolemia     5. Hypertriglyceridemia     6. Central sleep apnea       The patient is doing well in terms of weight loss, has lost some muscle mass since his last visit, but overall continues to improve his weight with portion control, less binge eating.  Focus more on whole carbohydrates as opposed to refined foods.  Food insecurity makes it difficult for him to have a lot of food choice.  Continue to monitor triglycerides as below.  Sleep stable.    The patient and I have discussed at length and agree to the following recommendations, which are all addressing the above diagnoses:    Weight Goal: 3-5% wt loss each month (pt goal weight is 200 lb)  Diet: 1-2 Lean Cuisine per day  More green vegetables with chicken for dinner  Reduce fruit intake to help reduce triglyceride levels  Physical Activity:  Continue sports,walking daily  New Rx: Continue metformin  Consider adding Omega3 FA if next TG level still elevated  Side Effects: consent in chart  Follow-up: 2 mo    Patient's body mass index is 60.67 kg/m². Exercise and nutrition counseling were performed at this visit.

## 2020-02-13 ENCOUNTER — OFFICE VISIT (OUTPATIENT)
Dept: HEALTH INFORMATION MANAGEMENT | Facility: MEDICAL CENTER | Age: 25
End: 2020-02-13
Payer: MEDICAID

## 2020-02-13 VITALS
DIASTOLIC BLOOD PRESSURE: 86 MMHG | HEIGHT: 71 IN | HEART RATE: 92 BPM | WEIGHT: 315 LBS | SYSTOLIC BLOOD PRESSURE: 132 MMHG | OXYGEN SATURATION: 89 % | BODY MASS INDEX: 44.1 KG/M2

## 2020-02-13 DIAGNOSIS — R53.82 CHRONIC FATIGUE: ICD-10-CM

## 2020-02-13 DIAGNOSIS — E55.9 VITAMIN D DEFICIENCY: ICD-10-CM

## 2020-02-13 DIAGNOSIS — E78.00 HYPERCHOLESTEROLEMIA: ICD-10-CM

## 2020-02-13 DIAGNOSIS — G47.31 CENTRAL SLEEP APNEA: ICD-10-CM

## 2020-02-13 DIAGNOSIS — F31.62 BIPOLAR DISORDER, CURRENT EPISODE MIXED, MODERATE (HCC): ICD-10-CM

## 2020-02-13 DIAGNOSIS — R63.2 BINGE EATING: ICD-10-CM

## 2020-02-13 DIAGNOSIS — E66.01 MORBID OBESITY (HCC): ICD-10-CM

## 2020-02-13 PROCEDURE — 99214 OFFICE O/P EST MOD 30 MIN: CPT | Performed by: INTERNAL MEDICINE

## 2020-02-13 RX ORDER — BLOOD SUGAR DIAGNOSTIC
1 STRIP MISCELLANEOUS DAILY
Qty: 1 EACH | Refills: 3 | Status: SHIPPED | OUTPATIENT
Start: 2020-02-13 | End: 2020-03-14

## 2020-02-13 ASSESSMENT — PATIENT HEALTH QUESTIONNAIRE - PHQ9: CLINICAL INTERPRETATION OF PHQ2 SCORE: 0

## 2020-02-13 NOTE — PROGRESS NOTES
"Bariatric Medicine Follow Up  Chief Complaint   Patient presents with   • Weight Gain       History of Present Illness:   Adama Burt is a 24 y.o. male who presents for weight management follow-up and to help address co-morbidities caused by overweight, as below.    During the patient's last visit, the following were discussed and recommended:  Weight Goal: 3-5% wt loss each month (pt goal weight is 200 lb)  Diet: 1-2 Lean Cuisine per day  More green vegetables with chicken for dinner  Reduce fruit intake to help reduce triglyceride levels  Physical Activity:  Continue sports,walking daily  New Rx: Continue metformin  Consider adding Omega3 FA if next TG level still elevated  Side Effects: consent in chart    He has more food available to him.  He has been eating a lot more.  He is trying to have more vegetables, but the quantity of his intake has increased again.  Sleeping more, walking less.  He wonders what else he can do for weight loss.    Pt unable to take phentermine d/t psychiatric diagnosis.  He does not think Abilify is working, is hoping to discontinue but will speak to psychiatry first.  He is not taking naltrexone, did not get much benefit in terms of appetite suppression on it..    HLD/TG: Not on statin or fenofibrate  JEAN: Refusing cpap.  VDD:  Taking Vit D weekkly    Exercise:   Walking but less often.  He will participate in the [employer] this spring.     Review of Systems   More fatigue.  More insomnia, not using CPAP for JEAN.    All other ROS were reviewed and are otherwise unchanged from my previous visit with patient.    Physical Exam:    /86 (BP Location: Left arm, Patient Position: Sitting, BP Cuff Size: Large adult long)   Pulse 92   Ht 1.803 m (5' 11\")   Wt (!) 205.8 kg (453 lb 12.8 oz)   SpO2 89%   BMI 63.29 kg/m²   Waist Measurement   Waist: 67 inch/inches  Weight change since last visit: +18 lb (-27 lb total)  Waist Circum change since last visit: 0 in (is " 1.5 in total)    Constitutional: Oriented to person, place, and time and well-developed, well-nourished, and in no distress.    Head: Normocephalic.   Musculoskeletal: Normal range of motion. No edema.   Neurological: Alert and oriented to person, place, and time. No muscle weakness.  Gait normal.   Skin: Warm and dry. Not diaphoretic.   Psychiatric: Mood, memory, affect and judgment normal.     Laboratory:   Recent labs reviewed.      Dietitian Assessment: n/a    ASSESSMENT/PLAN:  Body mass index is 63.29 kg/m².    Obesity Stage ([place]):  2; Class 3    1. Morbid obesity (HCC)     2. Chronic fatigue     3. Binge eating     4. Central sleep apnea     5. Hypercholesterolemia     6. Vitamin D deficiency     7. Bipolar disorder, current episode mixed, moderate (HCC)       The patient struggled again with overeating.  Suggest discontinuing Abilify if he feels it is not working but he will discuss with psychiatry first.  Suggest starting Saxenda to help with weight loss if covered by his insurance as he has limited other choices given his psychiatric history and current medications.  Sleep is an issue but patient declines CPAP.  Continue to monitor lipids, vitamin D.    The patient and I have discussed at length and agree to the following recommendations, which are all addressing the above diagnoses:    Weight Goal: 3-5% wt loss each month (pt goal weight is 200 lb)  Diet: Needs to discuss with his  how to reduce food available to him  Physical Activity: Walking more, beginning to participate in [employer]  Rx: cont metformin  Add Saxenda if covered by insurance  Add phentermine if ok with psychiatry  D/c abilify if ok with psychiatry as pt feels it is not working  Side Effects: consent in chart  Behavior change: Needs less access to food!  Follow-up: 1 mo    Patient's body mass index is 63.29 kg/m². Exercise and nutrition counseling were performed at this visit.

## 2020-04-07 ENCOUNTER — OFFICE VISIT (OUTPATIENT)
Dept: HEALTH INFORMATION MANAGEMENT | Facility: MEDICAL CENTER | Age: 25
End: 2020-04-07
Payer: MEDICAID

## 2020-04-07 DIAGNOSIS — E78.1 HYPERTRIGLYCERIDEMIA: ICD-10-CM

## 2020-04-07 DIAGNOSIS — R63.2 BINGE EATING: ICD-10-CM

## 2020-04-07 DIAGNOSIS — F31.62 BIPOLAR DISORDER, CURRENT EPISODE MIXED, MODERATE (HCC): ICD-10-CM

## 2020-04-07 DIAGNOSIS — E78.00 HYPERCHOLESTEROLEMIA: ICD-10-CM

## 2020-04-07 DIAGNOSIS — E66.01 MORBID OBESITY (HCC): ICD-10-CM

## 2020-04-07 DIAGNOSIS — R53.82 CHRONIC FATIGUE: ICD-10-CM

## 2020-04-07 PROCEDURE — 99214 OFFICE O/P EST MOD 30 MIN: CPT | Mod: CR | Performed by: INTERNAL MEDICINE

## 2020-04-07 NOTE — PROGRESS NOTES
This encounter was conducted via Zoom .   Verbal consent was obtained. Patient's identity was verified.    Bariatric Medicine Follow Up  Chief Complaint   Patient presents with   • Weight Gain       History of Present Illness:   Adama Burt is a 24 y.o. male who presents for weight management follow-up and to help address co-morbidities caused by overweight, as below.    During the patient's last visit, the following were discussed and recommended:  Weight Goal: 3-5% wt loss each month (pt goal weight is 200 lb)  Diet: Needs to discuss with his  how to reduce food available to him  Physical Activity: Walking more, beginning to participate in [employer]  Rx: cont metformin  Add Saxenda if covered by insurance  Add phentermine if ok with psychiatry  D/c abilify if ok with psychiatry as pt feels it is not working  Side Effects: consent in chart  Behavior change: Needs less access to food!    He is trying to stay more active.  Will start at [employer] soon, 90 days of work if he can.  He is looking forward to it. Had to put new hole in belt.  He thinks he has lost wt.  He is eating a lot more vegetables, was stuck with unhealthy foods where he lives b/c they could not get access to vegetables.    He did not get Saxenda approved.  Has not been able to ask psychiatry about phentermine, had forgotten about it.  Confirms he is still taking metformin.    JEAN: Sleep stable  HLD:  Not currently on statin or fenofibrate  VDD:  Takes daily Vit D  Bipolar disorder: Mood stable    Exercise:   Basketball daily  Will walk from new job     Review of Systems   Denies lightheadedness, worsening fatigue.  Has higher energy level.  Sleeping well.  All other ROS were reviewed and are otherwise unchanged from my previous visit with patient.    Physical Exam:    There were no vitals taken for this visit.   Pt does not have scale for weighing.    Constitutional: Oriented to person, place, and time and  well-developed, well-nourished, and in no distress.    Head: Normocephalic.   Psychiatric: Mood, memory, affect and judgment normal.     Laboratory:   Recent labs reviewed.      Dietitian Assessment: n/a    ASSESSMENT/PLAN:  There is no height or weight on file to calculate BMI.    Obesity Stage ([place]):  1; Class 3    1. Morbid obesity (HCC)     2. Chronic fatigue     3. Binge eating     4. Hypercholesterolemia     5. Hypertriglyceridemia     6. Bipolar disorder, current episode mixed, moderate (HCC)       The patient is doing well, thinks he has lost weight by eating less, smaller portion sizes, trying to incorporate more vegetables and exercising much more.  He also continues to take metformin.  Recommend possibly adding phentermine at next visit pending course but if he continues with current weight loss, he may not need it.  Fatigue appears improved.  Recheck lipids at next visit.  Mood stable.    The patient and I have discussed at length and agree to the following recommendations, which are all addressing the above diagnoses:    Weight Goal: 3-5% wt loss each month (pt goal weight is 200 lb)  Diet: Avoid processed foods, d/w home  Smaller portions  Eating more vegetables, from [employer]  Physical Activity:  Daily basketball and walking  Rx: Pt to ask psychiatry about starting phentermine  Side Effects: consent in chart  Behavior change: New increasing activity, incorporating more vegetables  Discussed also with his care counselor today  Follow-up: 3 mo  Update lipid profile, BMP at next visit    Patient's body mass index is unknown because there is no height or weight on file. Exercise and nutrition counseling were performed at this visit.

## 2020-07-23 ENCOUNTER — OFFICE VISIT (OUTPATIENT)
Dept: HEALTH INFORMATION MANAGEMENT | Facility: MEDICAL CENTER | Age: 25
End: 2020-07-23
Payer: MEDICAID

## 2020-07-23 VITALS
SYSTOLIC BLOOD PRESSURE: 130 MMHG | OXYGEN SATURATION: 91 % | BODY MASS INDEX: 44.1 KG/M2 | HEIGHT: 71 IN | DIASTOLIC BLOOD PRESSURE: 80 MMHG | WEIGHT: 315 LBS | HEART RATE: 102 BPM

## 2020-07-23 DIAGNOSIS — E66.01 MORBID OBESITY (HCC): ICD-10-CM

## 2020-07-23 DIAGNOSIS — G47.31 CENTRAL SLEEP APNEA: ICD-10-CM

## 2020-07-23 DIAGNOSIS — F31.62 BIPOLAR DISORDER, CURRENT EPISODE MIXED, MODERATE (HCC): ICD-10-CM

## 2020-07-23 DIAGNOSIS — E78.1 HYPERTRIGLYCERIDEMIA: ICD-10-CM

## 2020-07-23 DIAGNOSIS — E78.00 HYPERCHOLESTEROLEMIA: ICD-10-CM

## 2020-07-23 DIAGNOSIS — R63.2 BINGE EATING: ICD-10-CM

## 2020-07-23 DIAGNOSIS — Z71.3 DIETARY COUNSELING AND SURVEILLANCE: ICD-10-CM

## 2020-07-23 DIAGNOSIS — R63.5 WEIGHT GAIN: ICD-10-CM

## 2020-07-23 PROCEDURE — 99401 PREV MED CNSL INDIV APPRX 15: CPT | Performed by: INTERNAL MEDICINE

## 2020-07-23 PROCEDURE — 99214 OFFICE O/P EST MOD 30 MIN: CPT | Mod: 25 | Performed by: INTERNAL MEDICINE

## 2020-07-23 NOTE — PROGRESS NOTES
Bariatric Medicine Follow Up  Chief Complaint   Patient presents with   • Weight Gain       History of Present Illness:   Adama Burt is a 25 y.o. male who presents for follow-up to intensive behavioral modification of overweight and to help address co-morbidities caused by overweight, as below.    Obesity Stage/Class: 3/3  During the patient's last visit, the following were discussed and recommended:  Weight Goal: 3-5% wt loss each month (pt goal weight is 200 lb)  Diet: Avoid processed foods, d/w home  Smaller portions  Eating more vegetables, from [employer]  Physical Activity:  Daily basketball and walking  Rx: Pt to ask psychiatry about starting phentermine  Side Effects: consent in chart  Behavior change: New increasing activity, incorporating more vegetables  Discussed also with his care counselor today      Challenges:  Unable to get out often from living facility, restricted due to COVID  Being told he needs to go grocery shopping 1-2 per wk even if food in his refrigerator, attempts him to eat more  Dietary adherence: Poor, overeating  Trying to incorporate more fruits and vegetables but not having smaller portions  Exercise: Basketball for 1 week but in general much less walking for the last 3 months    Medication use: None  Never heard whether phentermine was a possibility from his psychiatrist  Medication efficacy/tolerance/side effects: N/A  Medication compliance: N/A    Status of comorbid conditions/other medical diagnoses:  JEAN: Controlled, on CPAP  HLD/TG: Uncontrolled, not on statin or fenofibrate  Bipolar disorder: Controlled on Abilify, Tegretol, follows with psychiatry regularly       Review of Systems   Pt denies lightheadedness, weakness, worsening fatigue, excessive dry mouth, mood changes, paresthesias.  All other ROS were reviewed and are otherwise unchanged from my previous visit with patient.    Physical Exam:    /80 (BP Location: Left arm, Patient Position: Sitting, BP  "Cuff Size: Large adult)   Pulse (!) 102   Ht 1.803 m (5' 11\")   Wt (!) 226.4 kg (499 lb 3.2 oz)   SpO2 91%   BMI 69.62 kg/m²   Waist Measurement   Waist: 71 inch/inches  Weight change since last visit: +45 lb (+25 lb total)  Waist Circum change since last visit: +4 in (+2.5 in total)    Constitutional: Oriented to person, place, and time and well-developed, well-nourished, and in no distress.    Head: Normocephalic.   Musculoskeletal: Normal range of motion. No edema.   Neurological: Alert and oriented to person, place, and time. No muscle weakness.  Gait normal.   Skin: Warm and dry. Not diaphoretic.   Psychiatric: Mood, memory, affect and judgment normal.     Laboratory:   Recent labs reviewed.      Dietitian Assessment: I have reviewed the Dietitian's assessment related to this encounter.     ASSESSMENT  25 y.o.  male presents for intensive lifestyle intervention for treatment of obesity and co-morbid conditions.  Obesity Stage ([place]) 3; Class 3    1. Weight gain  REFERRAL TO BARIATRIC SURGERY   2. Binge eating     3. Central sleep apnea  REFERRAL TO BARIATRIC SURGERY   4. Morbid obesity (HCC)  REFERRAL TO BARIATRIC SURGERY   5. Hypercholesterolemia     6. Hypertriglyceridemia     7. Bipolar disorder, current episode mixed, moderate (HCC)         The patient has had significant weight gain, binge eating, exercising less over the last 3 months.  Anti-obesity medications are difficult given patient cannot afford, is on psychotropics simultaneously.  Referred to bariatric surgery to help treat JEAN, metabolic disease.    PLAN  Weight Goal: 5% wt loss at one month after start (pt goal weight is 200 lb)  Dietary intervention:    MR: Not using, on food stamps  High Protein/Low Carb whole food meals and 2 snacks between meals daily  Not tracking  Advised patient to shop only when needed, every 2 weeks, to avoid binge eating  Use only food list given for shopping  64+ oz water per day  If bariatric surgery not an " option for this patient, will need to start VLCD next visit given significant weight gain.  Physical Activity: Walking every other day for now given COVID  Rx changes:   Patient to discuss with psychiatry whether phentermine and Topamax can be prescribed  Start fenofibrate next visit  Behavior modification:   Increase activity  Surgical considerations: Referral given for Dr. Quiles, to evaluate for bariatric surgery as patient unsuccessful with medical weight loss  Follow-up: one month with MD, weekly to biweekly for preventive obesity counseling    Patient's body mass index is 69.62 kg/m². Exercise and nutrition counseling were performed at this visit.        >>>>>>>>>>>>>      PREVENTIVE SERVICES COUNSELING FOR OBESITY NOTE    O:  Body mass index is 69.62 kg/m²., see also vitals flowsheet for today  Pt struggling with:  Cravings  Keeping food diary/tracking consumption  Meal planning  A:  Dietary counseling and surveillance for obesity  Z71.3, E66.01, BMI Code: Z68.44  P:  Counseled pt on reduced kcal, reduced refined CHO whole food meal plan  Advised exercise: Walking daily  Discussed strategies of self-monitoring with:   food diary  cognitive restructuring  stimulus control  Meal pre-planning  Environmental management  Time: 15 min

## 2020-10-01 ENCOUNTER — OFFICE VISIT (OUTPATIENT)
Dept: HEALTH INFORMATION MANAGEMENT | Facility: MEDICAL CENTER | Age: 25
End: 2020-10-01
Payer: MEDICAID

## 2020-10-01 VITALS
SYSTOLIC BLOOD PRESSURE: 126 MMHG | HEART RATE: 90 BPM | BODY MASS INDEX: 44.1 KG/M2 | DIASTOLIC BLOOD PRESSURE: 84 MMHG | HEIGHT: 71 IN | WEIGHT: 315 LBS | OXYGEN SATURATION: 93 %

## 2020-10-01 DIAGNOSIS — Z71.3 DIETARY COUNSELING AND SURVEILLANCE: ICD-10-CM

## 2020-10-01 DIAGNOSIS — G47.31 CENTRAL SLEEP APNEA: ICD-10-CM

## 2020-10-01 DIAGNOSIS — E78.00 HYPERCHOLESTEROLEMIA: ICD-10-CM

## 2020-10-01 DIAGNOSIS — E66.01 MORBID OBESITY (HCC): ICD-10-CM

## 2020-10-01 DIAGNOSIS — R63.2 BINGE EATING: ICD-10-CM

## 2020-10-01 DIAGNOSIS — F31.62 BIPOLAR DISORDER, CURRENT EPISODE MIXED, MODERATE (HCC): ICD-10-CM

## 2020-10-01 DIAGNOSIS — E88.810 METABOLIC SYNDROME: ICD-10-CM

## 2020-10-01 PROCEDURE — 99213 OFFICE O/P EST LOW 20 MIN: CPT | Mod: 25 | Performed by: INTERNAL MEDICINE

## 2020-10-01 PROCEDURE — 99401 PREV MED CNSL INDIV APPRX 15: CPT | Performed by: INTERNAL MEDICINE

## 2020-10-01 ASSESSMENT — PATIENT HEALTH QUESTIONNAIRE - PHQ9: CLINICAL INTERPRETATION OF PHQ2 SCORE: 0

## 2020-10-01 NOTE — PROGRESS NOTES
Bariatric Medicine Follow Up  Chief Complaint   Patient presents with   • Weight Gain       History of Present Illness:   Adama Burt is a 25 y.o. male who presents for follow-up to intensive behavioral modification of overweight and to help address co-morbidities caused by overweight, as below.    Obesity Stage/Class: 3/3  During the patient's last visit, the following were discussed and recommended:  Weight Goal: 5% wt loss at one month after start (pt goal weight is 200 lb)  Dietary intervention:    MR: Not using, on food stamps  High Protein/Low Carb whole food meals and 2 snacks between meals daily  Not tracking  Advised patient to shop only when needed, every 2 weeks, to avoid binge eating  Use only food list given for shopping  64+ oz water per day  If bariatric surgery not an option for this patient, will need to start VLCD next visit given significant weight gain.  Physical Activity: Walking every other day for now given COVID  Rx changes:   Patient to discuss with psychiatry whether phentermine and Topamax can be prescribed  Start fenofibrate next visit  Behavior modification:   Increase activity  Surgical considerations: Referral given for Dr. Quiles, to evaluate for bariatric surgery as patient unsuccessful with medical weight loss    ___    Challenges:  Virtual sports not his favorite  Dietary adherence:  Not good last week  Admits to more Mishel Sims last week  When eats better breakfast, eating less later in the day  Exercise:  More walking, back to work, more stairs    AntiObesity Medication use: none  Medication efficacy/tolerance/side effects: n/a  Medication compliance: n/a    Status of comorbid conditions/other medical diagnoses:  JEAN:  Controlled  Bipolar:  Controlled  HLD:  Uncontrolled, not on statin       Review of Systems   Pt denies lightheadedness, weakness, worsening fatigue, excessive dry mouth, mood changes, paresthesias.  All other ROS were reviewed and are otherwise  "unchanged from my previous visit with patient.    Physical Exam:    /84 (BP Location: Left arm, Patient Position: Sitting, BP Cuff Size: Large adult)   Pulse 90   Ht 1.803 m (5' 11\")   Wt (!) 223 kg (491 lb 9.6 oz)   SpO2 93%   BMI 68.56 kg/m²   Waist Measurement   Waist: 71 inch/inches  Weight change since last visit: -8 lb (+20 lb total)  Waist Circum change since last visit: 0 in (+2.5 in total)    Constitutional: Oriented to person, place, and time and well-developed, well-nourished, and in no distress.    Head: Normocephalic.   Musculoskeletal: Normal range of motion. No edema.   Neurological: Alert and oriented to person, place, and time. No muscle weakness.  Gait normal.   Skin: Warm and dry. Not diaphoretic.   Psychiatric: Mood, memory, affect and judgment normal.     Laboratory:   Recent labs reviewed.      Dietitian Assessment: n/a    ASSESSMENT  25 y.o.  male presents for intensive lifestyle intervention for treatment of obesity and co-morbid conditions.  Obesity Stage ([place])/Class: 3/3    1. Hypercholesterolemia     2. Binge eating     3. Central sleep apnea     4. Bipolar disorder, current episode mixed, moderate (HCC)     5. Morbid obesity (HCC)     6. Metabolic syndrome         The pt has improved his food choices, led to some wt loss.  When he avoids FF and is more active, he loses wt.  At this point, suggest bariatric surgery eval.  Control binge eating with lower carb intake.  Avoid AOM d/t bipolar d/o.  Will continue to encourage patient to increase his activity level.    PLAN  Weight Goal:  200 lb  Dietary intervention:   If eating FF, have bunless meat sancho and salad instead of fries   MR:  0  High Protein/Low Carb whole food meals and 2 snacks between meals daily  Not tracking  64+ oz water per day  Avoid skipping am meal  Physical Activity:  Walking at work, stairs daily  Labs:  n/a  Rx changes:   Consider GLP1 agonist  Behavior modification:   The more exercise, the better he " does with wt loss  Surgical considerations:  Pending eval with Dr. Quiles  Follow-up: 2 month with MD    Patient's body mass index is 68.56 kg/m². Exercise and nutrition counseling were performed at this visit.        >>>>>>>>>>>>>      PREVENTIVE SERVICES COUNSELING FOR OBESITY NOTE    O:  Body mass index is 68.56 kg/m²., see also vitals flowsheet for today  Pt struggling with:  Stress management  Cravings  Keeping food diary/tracking consumption  Meal planning  Meal prep  Taking medications as prescribed  A:  Dietary counseling and surveillance for obesity  Z71.3, E66.01, BMI Code: Z68.  4 4  P:  Counseled pt on reduced kcal, reduced refined CHO whole food meal plan  Advised exercise: Walking, outdoor sports  Discussed strategies of self-monitoring with:   food diary  cognitive restructuring  stress reduction  stimulus control  Meal pre-planning  Environmental management  Time: 15 min

## 2021-03-01 ENCOUNTER — APPOINTMENT (OUTPATIENT)
Dept: HEALTH INFORMATION MANAGEMENT | Facility: MEDICAL CENTER | Age: 26
End: 2021-03-01
Payer: MEDICAID

## 2021-03-02 ENCOUNTER — OFFICE VISIT (OUTPATIENT)
Dept: HEALTH INFORMATION MANAGEMENT | Facility: MEDICAL CENTER | Age: 26
End: 2021-03-02

## 2021-03-02 ENCOUNTER — APPOINTMENT (OUTPATIENT)
Dept: HEALTH INFORMATION MANAGEMENT | Facility: MEDICAL CENTER | Age: 26
End: 2021-03-02
Payer: MEDICAID

## 2021-03-02 VITALS
DIASTOLIC BLOOD PRESSURE: 84 MMHG | HEIGHT: 71 IN | HEART RATE: 82 BPM | SYSTOLIC BLOOD PRESSURE: 134 MMHG | BODY MASS INDEX: 44.1 KG/M2 | OXYGEN SATURATION: 93 % | WEIGHT: 315 LBS

## 2021-03-02 DIAGNOSIS — R63.2 BINGE EATING: ICD-10-CM

## 2021-03-02 DIAGNOSIS — E66.01 MORBID OBESITY (HCC): ICD-10-CM

## 2021-03-02 DIAGNOSIS — G47.31 CENTRAL SLEEP APNEA: ICD-10-CM

## 2021-03-02 DIAGNOSIS — F31.62 BIPOLAR DISORDER, CURRENT EPISODE MIXED, MODERATE (HCC): ICD-10-CM

## 2021-03-02 DIAGNOSIS — E88.810 METABOLIC SYNDROME: ICD-10-CM

## 2021-03-02 DIAGNOSIS — R63.5 WEIGHT GAIN: ICD-10-CM

## 2021-03-02 DIAGNOSIS — E55.9 VITAMIN D DEFICIENCY: ICD-10-CM

## 2021-03-02 DIAGNOSIS — E78.00 HYPERCHOLESTEROLEMIA: ICD-10-CM

## 2021-03-02 PROCEDURE — 99214 OFFICE O/P EST MOD 30 MIN: CPT | Performed by: INTERNAL MEDICINE

## 2021-03-02 RX ORDER — SEMAGLUTIDE 1.34 MG/ML
0.25 INJECTION, SOLUTION SUBCUTANEOUS
Qty: 1 EACH | Refills: 3 | Status: SHIPPED | OUTPATIENT
Start: 2021-03-02 | End: 2021-03-02 | Stop reason: SDUPTHER

## 2021-03-02 RX ORDER — SEMAGLUTIDE 1.34 MG/ML
0.25 INJECTION, SOLUTION SUBCUTANEOUS
Qty: 1 EACH | Refills: 3 | Status: SHIPPED | OUTPATIENT
Start: 2021-03-02 | End: 2021-06-09 | Stop reason: CLARIF

## 2021-03-02 ASSESSMENT — PATIENT HEALTH QUESTIONNAIRE - PHQ9: CLINICAL INTERPRETATION OF PHQ2 SCORE: 0

## 2021-03-02 NOTE — PROGRESS NOTES
"Bariatric Medicine Follow Up  Chief Complaint   Patient presents with   • Weight Gain   • Obesity       History of Present Illness:   Adama Burt is a 25 y.o. male who presents for follow-up to intensive behavioral modification of overweight and to help address below co-morbidities caused by overweight.      Weight Management History to Date:  10/1/2020 last visit:  Goal was less fast food, less bread and fries  More vegetables  Not using MR or tracking  Avoid skipping a.m. meal  Walking more at work  Considering GLP-1 agonist  Was to see Dr. Quiles for bariatric surgery evaluation but patient states insurance does not cover  ___    Challenges since last visit: No consistent follow-up  Lost 20 lb two mos ago but then regained  Dietary adherence: Poor  Portion size still large  Not tracking or using MR  Eating more fruit  Not eating vegetables  Exercise:  Walking for work, skating    AntiObesity Medication use:   Started new medication for wt loss PCP but pt does not know name  Medication efficacy/tolerance/side effects: Was initially effective then no longer  Medication compliance: Stopped taking    Status of comorbid conditions/other medical diagnoses:  HLD: Uncontrolled TG on last labs, no recent labs  Bipolar disorder: Controlled with Abilify  JEAN: Controlled  VDD: Controlled with daily vitamin D supplement       Review of Systems   Pt denies lightheadedness, weakness, worsening fatigue, excessive dry mouth, mood changes, paresthesias.  All other ROS were reviewed and are otherwise unchanged from my previous visit with patient.    Physical Exam:    /84 (BP Location: Left arm, Patient Position: Sitting, BP Cuff Size: Large adult)   Pulse 82   Ht 1.803 m (5' 11\")   Wt (!) 234 kg (515 lb 12.8 oz)   SpO2 93%   BMI 71.94 kg/m²   Waist Measurement   Waist: 71.5 inch/inches  Weight change since last visit: +24 lb (+44 lb total)  Waist Circum change since last visit: +0.5 in (+3 in " total)    Constitutional: Oriented to person, place, and time and well-developed, well-nourished, and in no distress.    Head: Normocephalic.   Musculoskeletal: Normal range of motion. No edema.   Neurological: Alert and oriented to person, place, and time. No muscle weakness.  Gait normal.   Skin: Warm and dry. Not diaphoretic.   Psychiatric: Mood, memory, affect and judgment normal.     Laboratory:   Recent labs reviewed.      Dietitian Assessment: N/A    ASSESSMENT  25 y.o.  male presents for intensive lifestyle intervention for treatment of obesity and co-morbid conditions.  Obesity Stage ([place])/Class: 3/3    1. Weight gain     2. Binge eating     3. Hypercholesterolemia     4. Metabolic syndrome     5. Bipolar disorder, current episode mixed, moderate (HCC)     6. Central sleep apnea     7. Morbid obesity (HCC)     8. Vitamin D deficiency         Patient has had significant weight regain since his last visit, with binge eating uncontrolled.  Start GLP-1 agonist if covered by his insurance.  Patient to notify me of which medication he tried for weight loss which had some effect.  Refer patient back to bariatric surgery for evaluation.  Monitor lipids, mood, sleep, vitamin D.    PLAN  Weight Goal: 200 lb  Dietary intervention:    MR:  0  High Protein/Low Carb whole food meals and 2 snacks between meals daily  Not regularly tracking intake  64+ oz water per day  Avoid excess fruit, focus on more green nonstarchy vegetables  Physical Activity: Walking daily for work  Labs: Update labs at next visit  Rx changes:   Start GLP-1 agonist Ozempic if covered by his insurance  Behavior modification:   Would benefit from stimulus narrowing  Surgical considerations: Refer back to Dr. Quiles for bariatric surgery evaluation, discuss further at next visit  Follow-up: one month with MD    Patient's body mass index is 71.94 kg/m². Exercise and nutrition counseling were performed at this visit.

## 2021-04-02 ENCOUNTER — HOSPITAL ENCOUNTER (EMERGENCY)
Facility: MEDICAL CENTER | Age: 26
End: 2021-04-02
Attending: EMERGENCY MEDICINE
Payer: MEDICAID

## 2021-04-02 VITALS
HEART RATE: 92 BPM | BODY MASS INDEX: 45.1 KG/M2 | RESPIRATION RATE: 17 BRPM | DIASTOLIC BLOOD PRESSURE: 78 MMHG | TEMPERATURE: 98.9 F | SYSTOLIC BLOOD PRESSURE: 168 MMHG | HEIGHT: 70 IN | OXYGEN SATURATION: 94 % | WEIGHT: 315 LBS

## 2021-04-02 DIAGNOSIS — L30.4 INTERTRIGO: ICD-10-CM

## 2021-04-02 PROCEDURE — 99283 EMERGENCY DEPT VISIT LOW MDM: CPT

## 2021-04-02 PROCEDURE — 700102 HCHG RX REV CODE 250 W/ 637 OVERRIDE(OP): Performed by: EMERGENCY MEDICINE

## 2021-04-02 PROCEDURE — A9270 NON-COVERED ITEM OR SERVICE: HCPCS | Performed by: EMERGENCY MEDICINE

## 2021-04-02 RX ORDER — NYSTATIN 100000 [USP'U]/G
1 POWDER TOPICAL 3 TIMES DAILY
Qty: 30 G | Refills: 0 | Status: ON HOLD | OUTPATIENT
Start: 2021-04-02 | End: 2021-10-29

## 2021-04-02 RX ORDER — NYSTATIN 100000 [USP'U]/G
POWDER TOPICAL ONCE
Status: COMPLETED | OUTPATIENT
Start: 2021-04-02 | End: 2021-04-02

## 2021-04-02 RX ADMIN — NYSTATIN: 100000 POWDER TOPICAL at 03:45

## 2021-04-02 NOTE — ED PROVIDER NOTES
"ED Provider Note    Scribed for Ashley Ozuna M.D. by Nimco Gallegos. 4/2/2021, 2:59 AM.    Primary care provider: [facility]   Means of arrival: Walk-In  History obtained from: Patient  History limited by: None    CHIEF COMPLAINT  Chief Complaint   Patient presents with   • Rash     burning rash to sides of neck       HPI  Adama Burt is an obese 25 y.o. male who presents to the Emergency Department for evaluation of a rash that started along the backside of his neck. He endorses associated warmth, burning, and itching to the neck. He states that this initially began about one day ago.. He states that he started using a different type of shampoo. He has used the same laundry detergent for a long amount of time.  Patient denies any sore throat or difficulty breathing.  Has not tried any over-the-counter medications for itching.  The patient's feet are also red and swollen, which is baseline for the patient. He has an extensive medical history but denies diabetes. He does not use a CPAP at night, because it makes it \"harder to fall asleep\". He lives by himself in an apartment.     REVIEW OF SYSTEMS  Review of Systems   Cardiovascular: Negative for chest pain.   Skin: Positive for itching and rash.   All other systems negative.     PAST MEDICAL HISTORY   has a past medical history of ADHD (attention deficit hyperactivity disorder), Apnea, sleep, Bipolar 1 disorder (HCC), Daytime sleepiness, Gasping for breath, Obesity, ODD (oppositional defiant disorder), Sleep apnea, and Snoring.    SURGICAL HISTORY   has a past surgical history that includes appendectomy child and tonsillectomy (3/17/2010).    SOCIAL HISTORY  Social History     Tobacco Use   • Smoking status: Never Smoker   • Smokeless tobacco: Never Used   Substance Use Topics   • Alcohol use: No   • Drug use: No      Social History     Substance and Sexual Activity   Drug Use No       FAMILY HISTORY  Family History   Family history " "unknown: Yes       CURRENT MEDICATIONS  Home Medications     Reviewed by Roxanne Young R.N. (Registered Nurse) on 04/02/21 at 0138  Med List Status: Partial   Medication Last Dose Status   aripiprazole (ABILIFY) 15 MG Tab  Active   aripiprazole (ABILIFY) 30 MG tablet  Active   carBAMazepine SR (TEGRETOL XR) 400 MG TABLET SR 12 HR  Active   Cholecalciferol (VITAMIN D) 2000 UNIT Tab  Active   ketoconazole (NIZORAL) 2 % Cream  Active   metFORMIN ER (GLUCOPHAGE XR) 750 MG TABLET SR 24 HR  Active   Semaglutide,0.25 or 0.5MG/DOS, (OZEMPIC, 0.25 OR 0.5 MG/DOSE,) 2 MG/1.5ML Solution Pen-injector  Active   zolpidem (AMBIEN) 5 MG Tab  Active                ALLERGIES  No Known Allergies    PHYSICAL EXAM  VITAL SIGNS: BP (!) 179/97 Comment: taken on pt's forearm  Pulse 95   Temp 37.2 °C (98.9 °F) (Temporal)   Resp 20   Ht 1.778 m (5' 10\")   Wt (!) 237 kg (523 lb 6 oz)   SpO2 93%   BMI 75.10 kg/m²   Vitals reviewed by myself.  Constitutional: Well-developed obese male. No acute distress.   HENT: Head is normocephalic and atraumatic.  Eyes: extra-ocular movements intact  Neck: Notable for erythema under his skin folds, consistent with intertrigo, no obvious satellite lesions, excoriations noted to the neck.  No vesicular lesions, rashes blanchable  Cardiovascular: regular rate and regular rhythm.  Pulmonary/Chest: Breath sounds normal. No wheezes or rales.   Musculoskeletal: Extremities exhibit normal range of motion without edema or tenderness.   Neurological: Awake and alert  Skin: see above in neck exam    DIAGNOSTIC STUDIES /  LABS  None    COURSE & MEDICAL DECISION MAKING  Nursing notes, VS, PMSFHx reviewed in chart.    Patient is a 25-year-old male who comes in for evaluation of neck rash and burning.  Differential diagnosis includes contact dermatitis, intertrigo, yeast infection.  On clinical exam patient is well-appearing, vitals notable for slight hypertension.  Rashes consistent with intertrigo likely " related to patient's skin folds.  I advised him on the importance of cleanliness and aerating the skin folds by placing gauze underneath in order to allow for the rash to heal.  I will also start him on nystatin powder to keep the area dry and to cover for any possible yeast skin infection.  Patient is amenable to this plan.  He is given strict return precautions and discharged in stable condition.      FINAL IMPRESSION  1. Intertrigo          Nimco CARDONA (Scribe), am scribing for, and in the presence of, Ashley Ozuna M.D..    Electronically signed by: Nimco Gallegos ([facility]), 4/2/2021    IAshley M.D. personally performed the services described in this documentation, as scribed by Nimco Gallegos in my presence, and it is both accurate and complete.    The note accurately reflects work and decisions made by me.  Ashley Ozuna M.D.  4/2/2021  4:24 AM

## 2021-04-02 NOTE — ED TRIAGE NOTES
Vitals:    04/02/21 0119   BP: (!) 179/97   Pulse: 95   Resp: 20   Temp: 37.2 °C (98.9 °F)   SpO2: 93%     Chief Complaint   Patient presents with   • Rash     burning rash to sides of neck     Pt c/o rash to both sides of his neck that keeps him awake at night. Neck is slightly red with little bumps.     Pt placed in lobby and educated on waiting room process. Apologized for wait time.

## 2021-05-11 ENCOUNTER — HOSPITAL ENCOUNTER (OUTPATIENT)
Dept: CARDIOLOGY | Facility: MEDICAL CENTER | Age: 26
End: 2021-05-11
Attending: NURSE PRACTITIONER
Payer: MEDICAID

## 2021-05-11 DIAGNOSIS — I87.2 VENOUS INSUFFICIENCY (CHRONIC) (PERIPHERAL): ICD-10-CM

## 2021-05-11 LAB — LV EJECT FRACT  99904: 60

## 2021-05-11 PROCEDURE — 93306 TTE W/DOPPLER COMPLETE: CPT | Mod: 26 | Performed by: INTERNAL MEDICINE

## 2021-05-11 PROCEDURE — 93306 TTE W/DOPPLER COMPLETE: CPT

## 2021-05-18 ENCOUNTER — OFFICE VISIT (OUTPATIENT)
Dept: SLEEP MEDICINE | Facility: MEDICAL CENTER | Age: 26
End: 2021-05-18
Payer: MEDICAID

## 2021-05-18 VITALS
OXYGEN SATURATION: 90 % | HEART RATE: 96 BPM | WEIGHT: 315 LBS | SYSTOLIC BLOOD PRESSURE: 132 MMHG | BODY MASS INDEX: 46.65 KG/M2 | DIASTOLIC BLOOD PRESSURE: 88 MMHG | RESPIRATION RATE: 16 BRPM | HEIGHT: 69 IN

## 2021-05-18 DIAGNOSIS — G47.31 COMPLEX SLEEP APNEA SYNDROME: ICD-10-CM

## 2021-05-18 DIAGNOSIS — G47.31 CENTRAL SLEEP APNEA: ICD-10-CM

## 2021-05-18 PROCEDURE — 99213 OFFICE O/P EST LOW 20 MIN: CPT | Performed by: FAMILY MEDICINE

## 2021-05-18 RX ORDER — ASPIRIN 81 MG/1
81 TABLET, CHEWABLE ORAL
COMMUNITY
Start: 2021-04-26 | End: 2022-04-17

## 2021-05-18 RX ORDER — ALBUTEROL SULFATE 90 UG/1
1-2 AEROSOL, METERED RESPIRATORY (INHALATION) EVERY 6 HOURS PRN
COMMUNITY
Start: 2021-04-26 | End: 2022-04-17

## 2021-05-18 NOTE — PROGRESS NOTES
[facility] Sleep [facility] Follow Up Note     Date: 5/18/2021 / Time: 11:45 AM    Patient ID:   Name:             Adama Burt   YOB: 1995  Age:                 25 y.o.  male   MRN:               [id number]      Thank you for requesting a sleep medicine consultation on Adama Burt at the sleep center. He presents today with the chief complaints of sleep apnea follow up.     HISTORY OF PRESENT ILLNESS:       Pt is supposed to be on ASV EPAP 12 cm PS 3/14 cm.  He has not been on his ASV machine for unknown time.  As per patient he has moved to different group home and believes that it was misplaced during the move.  As per him his sleep has improved over time however continues to have symptoms of daytime sleepiness.  He goes to sleep around 8-9 pm and wakes up around 6-7 am. He is getting about 6-7 hrs of sleep on a good night and about 5 hr of sleep on a bad night. Overall,he  doesnot finds his sleep refreshing.He does not take regular naps.He denies any symptoms of RLS, narcolepsy or any symptoms to suggest parasomnias such as nightmares, sleep walking or acting out of dreams.. The symptoms of sleep apnea including non restful sleep has relapsed.      SLEEP HISTORY   Titration study: The PAP titration was initiated with BiPAP 18/14 cm of water and the pressure which was slowly titrated up in an attempt to eliminate sleep disordered breathing and snoring. The BiPAP was increased to 23/19 cm however the central apneas persisted. Therefore he was switched to Auto ASV titration. However he only had short period of sleep on ASV. The average ASV EPAP pressure on the night of the study was EPAP 12 cm. At this final pressure the patient was observed in the supine position and in the REM sleep stage. The apnea hypopnea index improved to 28 per hour and O2 zoe 79%. The average O2 stauration was 91%.      Split night: The split night study on 11/14/17 showed Severe mainly central sleep  apnea was found. Central apneas   comprised 85.8% of the 282 apneas and hypopneas. The apnea hypopnea index was 154.5/hr. He met the split night criteria. treatment with CPAP at 5 cm water and gradually increased the pressure to a maximum of 14 cm water without event resolution. Bilevel was then tried starting with bilevel 18/14 and ending with bilevel 20/16 cm water. The patient did best on bilevel 19/15 with a resultant AHI 16.2 and improved saturations      REVIEW OF SYSTEMS:       Constitutional: Denies fevers, Denies weight changes  Eyes: Denies changes in vision, no eye pain  Ears/Nose/Throat/Mouth: Denies nasal congestion or sore throat   Cardiovascular: Denies chest pain or palpitations   Respiratory: Denies shortness of breath , Denies cough  Gastrointestinal/Hepatic: Denies abdominal pain, nausea, vomiting, diarrhea, constipation or GI bleeding   Genitourinary: Deniesdysuria or frequency  Musculoskeletal/Rheum: Denies  joint pain and swelling   Skin/Breast: Denies rash,   Neurological: Denies headache, confusion, memory loss or focal weakness/parasthesias  Psychiatric: denies mood disorder   Sleep: + EDS    Comprehensive review of systems form is reviewed with the patient and is attached in the EMR.     PMH:  has a past medical history of ADHD (attention deficit hyperactivity disorder), Apnea, sleep, Bipolar 1 disorder (HCC), Daytime sleepiness, Gasping for breath, Obesity, ODD (oppositional defiant disorder), Sleep apnea, and Snoring.  MEDS:   Current Outpatient Medications:   •  aspirin (ASA) 81 MG Chew Tab chewable tablet, , Disp: , Rfl:   •  VENTOLIN  (90 Base) MCG/ACT Aero Soln inhalation aerosol, , Disp: , Rfl:   •  metFORMIN ER (GLUCOPHAGE XR) 750 MG TABLET SR 24 HR, TAKE 1 TABLET BY MOUTH ONCE DAILY., Disp: 30 Tab, Rfl: 2  •  aripiprazole (ABILIFY) 30 MG tablet, , Disp: , Rfl:   •  nystatin (MYCOSTATIN) powder, Apply 1 g topically 3 times a day., Disp: 30 g, Rfl: 0  •  Semaglutide,0.25 or  "0.5MG/DOS, (OZEMPIC, 0.25 OR 0.5 MG/DOSE,) 2 MG/1.5ML Solution Pen-injector, Inject 0.25 mg under the skin every 7 days. (Patient not taking: Reported on 5/18/2021), Disp: 1 Each, Rfl: 3  •  carBAMazepine SR (TEGRETOL XR) 400 MG TABLET SR 12 HR, , Disp: , Rfl:   •  Cholecalciferol (VITAMIN D) 2000 UNIT Tab, , Disp: , Rfl:   •  ketoconazole (NIZORAL) 2 % Cream, , Disp: , Rfl:   •  zolpidem (AMBIEN) 5 MG Tab, Take 1-2 tablets by mouth every evening as needed for insomnia. Bring to sleep study. (Patient not taking: Reported on 5/18/2021), Disp: 3 Tab, Rfl: 0  •  aripiprazole (ABILIFY) 15 MG Tab, Take 30 mg by mouth every day., Disp: , Rfl:   ALLERGIES: No Known Allergies  SURGHX:   Past Surgical History:   Procedure Laterality Date   • TONSILLECTOMY  3/17/2010    Performed by ANKIT HERNANDEZ at SURGERY SAME DAY [facility] ORS   • APPENDECTOMY CHILD       SOCHX:  reports that he has never smoked. He has never used smokeless tobacco. He reports that he does not drink alcohol and does not use drugs..  FH:   Family History   Family history unknown: Yes         Physical Exam:  Vitals/ General Appearance:   Weight/BMI: Body mass index is 73.1 kg/m².  /88 (BP Location: Left arm, Patient Position: Sitting, BP Cuff Size: Adult)   Pulse 96   Resp 16   Ht 1.753 m (5' 9\")   Wt (!) 225 kg (495 lb)   SpO2 90%   Vitals:    05/18/21 1129   BP: 132/88   BP Location: Left arm   Patient Position: Sitting   BP Cuff Size: Adult   Pulse: 96   Resp: 16   SpO2: 90%   Weight: (!) 225 kg (495 lb)   Height: 1.753 m (5' 9\")       Pt. is alert and oriented to time, place and person. Cooperative and in no apparent distress.       Constitutional: Alert, no distress, well-groomed.  Skin: No rashes in visible areas.  Eye: Round. Conjunctiva clear, lids normal. No icterus.   ENMT: Lips pink without lesions, good dentition, moist mucous membranes. Phonation normal.  Neck: No masses, no thyromegaly. Moves freely without pain.  CV: Pulse as " reported by patient  Respiratory: Unlabored respiratory effort, no cough or audible wheeze  Psych: Alert and oriented x3, normal affect and mood.     ASSESSMENT AND PLAN     1.Central Sleep Apnea    He is urged to avoid supine sleep, weight gain and alcoholic beverages since all of these can worsen sleep apnea. He is cautioned against drowsy driving. If He feels sleepy while driving, He must pull over for a break/nap, rather than persist on the road, in the interest of He own safety and that of others on the road.   Plan   -  After informed discussion titration is ordered today to reestablish the need and to order a new ASV machine   - previous SS was reviewed and discussed with the pt   - compliance was reinforced     2. Regarding treatment of other past medical problems and general health maintenance,  He is urged to follow up with PCP.

## 2021-05-20 ENCOUNTER — SLEEP STUDY (OUTPATIENT)
Dept: SLEEP MEDICINE | Facility: MEDICAL CENTER | Age: 26
End: 2021-05-20
Attending: FAMILY MEDICINE
Payer: MEDICAID

## 2021-05-20 DIAGNOSIS — G47.31 CENTRAL SLEEP APNEA: ICD-10-CM

## 2021-05-21 NOTE — PROCEDURES
Technical summary: The patient underwent a CPAP titration.  This was a 16 channel montage study to include a 6 channel EEG, a 2 channel EOG, and chin EMG, left and right leg EMG, a snore channel, and a CFLOW pressure transducer.   Respiratory effort was assessed with the use of a thoracic and abdominal monitor and overnight oximetry was obtained. Audio and video recordings were reviewed. This was a fully attended study and sleep stage scoring was performed. The test was technically adequate.      Interpretation:  Study start time was 09:12:25 PM. Diagnostic recording time was 8h 46.0m with a total sleep time of 7h 42.5m resulting in a sleep efficiency of 87.93%%.   Sleep latency from the start of the study was 01 minutes and the latency from sleep to REM was 99 minutes.  In total,64 arousals were scored for an arousal index of 8.3.    Respiratory:  There were a total of 16 apneas consisting of 2 obstructive apneas, 0 mixed apneas, and 14 central apneas. A total of 185 hypopneas were scored.  The apnea index was 2.08 per hour and the hypopnea index was 24.00 per hour resulting in an overall AHI of 26.08.AHI during rem was 13.8 and AHI while supine was 26.08.    Oximetry:  There was a mean oxygen saturation of 85.0% with a minimum oxygen saturation of 69.0%. Time spent with oxygen saturations below 89% was 471.6 minutes.  Regardless of artifact he had sleep-related hypoxia.    Cardiac:  The highest heart rate seen while awake was 118 BPM while the highest heart rate during sleep was 106 BPM with an average sleeping heart rate of 79 BPM.    Limb Movements:  There were a total of 38 PLMs during sleep, of which 1 were PLMS arousals. This resulted in a PLMS index of 4.9 and a PLMS arousal index of 0.1.    CPAP was tried from 8 to 16cm H2O. BiPAP was tried from 20/16 to 26/22cm H2O. Supplemental O2 was tried at 2Lpm      CPAP Titration:  The PAP titration was initiated with CPAP 8 cm of water and the pressure which was  slowly titrated up in an attempt to eliminate sleep disordered breathing and snoring. CPAP was increased to 16  Cm before switching to BiPAP. The BiPAP was titrated between 20/16 cm to 26/22 cm.  Supplemental O2 at 2 L/min was tried at the ending pressure of BiPAP 26/22.  The best tolerated pressure was BiPAP 25/21.  The patient was observed in supine REM sleep with improved AHI of 4.7/h and O2 zoe of 82%.  He spent 439 minutes  of sleep time below 89% O2 saturation. The patient utilized a medium Simplus mask with heated humidification. The PAP was well-tolerated and there were minimal air leaks.    Impression:  1.  Central sleep apnea  2.  Sleep-related hypoxia    Recommendations:  I recommend BiPAP 25/21 cm with O2 bleed in and Simplus mask mask. Recommended 30 day compliance download to assess the efficacy of the recommended pressure and compliance for further outpatient monitoring and management of CPAP therapy.  Consider overnight pulse oximetry on the follow-up.  In some cases alternative treatment options may prove effective in resolving sleep apnea and these options include upper airway surgery, the use of a dental orthotic or weight loss and positional therapy. Clinical correlation is required. In general patients with sleep apnea are advised to avoid alcohol and sedatives and to not operate a motor vehicle while drowsy and are at a greater risk for cardiovascular disease.

## 2021-06-02 PROCEDURE — 95811 POLYSOM 6/>YRS CPAP 4/> PARM: CPT | Performed by: FAMILY MEDICINE

## 2021-06-03 ENCOUNTER — OFFICE VISIT (OUTPATIENT)
Dept: SLEEP MEDICINE | Facility: MEDICAL CENTER | Age: 26
End: 2021-06-03
Payer: MEDICAID

## 2021-06-03 VITALS
DIASTOLIC BLOOD PRESSURE: 70 MMHG | WEIGHT: 315 LBS | BODY MASS INDEX: 46.65 KG/M2 | HEIGHT: 69 IN | OXYGEN SATURATION: 91 % | SYSTOLIC BLOOD PRESSURE: 126 MMHG | RESPIRATION RATE: 16 BRPM | HEART RATE: 84 BPM

## 2021-06-03 DIAGNOSIS — G47.34 NOCTURNAL OXYGEN DESATURATION: ICD-10-CM

## 2021-06-03 DIAGNOSIS — G47.31 CENTRAL SLEEP APNEA: ICD-10-CM

## 2021-06-03 PROCEDURE — 99213 OFFICE O/P EST LOW 20 MIN: CPT | Performed by: PREVENTIVE MEDICINE

## 2021-06-03 NOTE — PROGRESS NOTES
"CC: \" Can I get a new machine yet? \"     Foster Mother:  Andria BORREGO  [phone]    HPI:  Adama Burt is a 25 y.o.male  Most recently this pt was on ASV at EPAP of 12 cmw ans a PS of 3/14 cmw.  However he no longer has this machine. The pt states that he had moved to different group home and believes that it was misplaced during the move.  He said it did help when he had the ASV machine.  Now the patient lives alone in an a  And his foster mother helps him by driving him where he needs to go. As a result of his lost ASV machine he was required he was re-titrated during an overnight PSG in the sleep lab.   The pt returns to this clinic for a the sleep  study results.  The results are as follows:     Sleep study results: 5/202021  The PAP titration was initiated with CPAP 8 cm of water and the pressure which was slowly titrated up in an attempt to eliminate sleep disordered breathing and snoring. CPAP was increased to 16  Cm before switching to BiPAP. The BiPAP was titrated between 20/16 cm to 26/22 cm.  Supplemental O2 at 2 L/min was tried at the ending pressure of BiPAP 26/22.  The best tolerated pressure was BiPAP 25/21.  The patient was observed in supine REM sleep with improved AHI of 4.7/h and O2 zoe of 82%.  He spent 439 minutes of sleep time below 89% O2 saturation. The patient utilized a medium Simplus mask with heated humidification. The PAP was well-tolerated and there were minimal air leaks.    Sleep History:  He goes to sleep around 8-9 pm and wakes up around 6-7 am. He is getting about 6-7 hrs of sleep on a good night and about 5 hr of sleep on a bad night. Overall,he  does not finds his sleep refreshing.He does not take regular naps.He denies any symptoms of RLS, narcolepsy or any symptoms to suggest parasomnias such as nightmares, sleep walking or acting out of dreams.. The symptoms of sleep apnea including non restful sleep has relapsed.    He has had two previous sleep " studies.:       Titration Sleep study on 1/23/2018: The PAP titration was initiated with BiPAP 18/14 cm of water and the pressure which was slowly titrated up in an attempt to eliminate sleep disordered breathing and snoring. The BiPAP was increased to 23/19 cm however the central apneas persisted. Therefore he was switched to Auto ASV titration. However he only had short period of sleep on ASV. The average ASV EPAP pressure on the night of the study was EPAP 12 cm. At this final pressure the patient was observed in the supine position and in the REM sleep stage. The apnea hypopnea index improved to 28 per hour and O2 zoe 79%. The average O2 saturation was 91%.      Split night: The split night study on 11/14/17 showed Severe mainly central sleep apnea was found. Central apneas  comprised 85.8% of the 282 apneas and hypopneas. The apnea hypopnea index was 154.5/hr. He met the split night criteria. treatment with CPAP at 5 cm water and gradually increased the pressure to a maximum of 14 cm water without event resolution. Bilevel was then tried starting with bilevel 18/14 and ending with bilevel 20/16 cm water. The patient did best on bilevel 19/15 with a resultant AHI 16.2 and improved saturations     REVIEW OF SYSTEMS:       Constitutional: Denies fevers, Denies weight changes  Eyes: Denies changes in vision, no eye pain  Ears/Nose/Throat/Mouth: Denies nasal congestion or sore throat   Cardiovascular: Denies chest pain or palpitations   Respiratory: Denies shortness of breath , Denies cough  Gastrointestinal/Hepatic: Denies abdominal pain, nausea, vomiting, diarrhea, constipation or GI bleeding   Genitourinary: Denies bladder dysfunction, dysuria or frequency  Musculoskeletal/Rheum: Denies  joint pain and swelling   Skin/Breast: Denies rash,   Neurological: Denies headache, confusion, memory loss or focal weakness/paraesthesias  Psychiatric: denies mood disorder       Patient Active Problem List    Diagnosis Date  Noted   • Vitamin D deficiency 03/02/2021   • Metabolic syndrome 10/01/2020   • Bipolar disorder, current episode mixed, moderate ([facility]) 02/13/2020   • Weight gain 04/29/2019   • Hypercholesterolemia 04/29/2019   • Hypertriglyceridemia 04/29/2019   • Morbid obesity ([facility]) 12/17/2018   • Chronic fatigue 12/17/2018   • Binge eating 12/17/2018   • Central sleep apnea 11/28/2017   • Cellulitis 09/19/2015       Past Medical History:   Diagnosis Date   • ADHD (attention deficit hyperactivity disorder)    • Apnea, sleep    • Bipolar 1 disorder ([facility])    • Daytime sleepiness    • Gasping for breath    • Obesity    • ODD (oppositional defiant disorder)    • Sleep apnea    • Snoring         Past Surgical History:   Procedure Laterality Date   • TONSILLECTOMY  3/17/2010    Performed by ANKIT HERNANDEZ at SURGERY SAME DAY [facility] ORS   • APPENDECTOMY CHILD         Family History   Family history unknown: Yes       Social History     Socioeconomic History   • Marital status: Single     Spouse name: Not on file   • Number of children: Not on file   • Years of education: Not on file   • Highest education level: Not on file   Occupational History   • Not on file   Tobacco Use   • Smoking status: Never Smoker   • Smokeless tobacco: Never Used   Vaping Use   • Vaping Use: Never used   Substance and Sexual Activity   • Alcohol use: No     Comment: rare    • Drug use: No   • Sexual activity: Not on file   Other Topics Concern   • Not on file   Social History Narrative   • Not on file     Social Determinants of Health     Financial Resource Strain:    • Difficulty of Paying Living Expenses:    Food Insecurity:    • Worried About Running Out of Food in the Last Year:    • Ran Out of Food in the Last Year:    Transportation Needs:    • Lack of Transportation (Medical):    • Lack of Transportation (Non-Medical):    Physical Activity:    • Days of Exercise per Week:    • Minutes of Exercise per Session:    Stress:    • Feeling of Stress :     Social Connections:    • Frequency of Communication with Friends and Family:    • Frequency of Social Gatherings with Friends and Family:    • Attends Church Services:    • Active Member of Clubs or Organizations:    • Attends Club or Organization Meetings:    • Marital Status:    Intimate Partner Violence:    • Fear of Current or Ex-Partner:    • Emotionally Abused:    • Physically Abused:    • Sexually Abused:            ALLERGIES: Patient has no known allergies.    ROS    Constitutional: Denies fever, chills, sweats,  weight loss, fatigue  Cardiovascular: Denies chest pain, tightness, palpitations, swelling in legs/feet, fainting, difficulty breathing when lying down but gets better when sitting up.   Respiratory: Denies shortness of breath, cough, sputum, wheezing, painful breathing, coughing up blood.   Sleep: per HPI  Gastrointestinal: Denies  difficulty swallowing, nausea, abdominal pain, diarrhea, constipation, heartburn.  Musculoskeletal: Denies painful joints, sore muscles, back pain.        PHYSICAL EXAM  BP: 126/70, P: 84. RR 16  BMI 72,66    Appearance: Obese male with poor hygiene, no acute distress  Eyes:  EOMI  ENMT: masked  Neck:  trachea midline,   Respiratory effort:  No intercostal retractions or use of accessory muscles  Musculoskeletal:  WIDE; gait and wide station consistent   Neurologic:; oriented to person, time, place, and purpose; judgement intact  Psychiatric:  No depression, anxiety, agitation      Medical Decision Making       The medical record was reviewed in its entirety including the referral notes, records from primary care, consultants notes, hospital records, lab, imaging, microbiology, immunology, and immunizations.  Care gaps identified and reviewed with the patient.    Diagnostic and titration nocturnal polysomnogram's, home sleep apnea tests, continuous nocturnal oximetry results, multiple sleep latency tests, and compliance reports reviewed.    1) CSA  2) Nocturnal  Hypoxemia  3) Mood Disorder  4) Obesity    PLAN:   This patient will be prescribed RESPIRONICS BiPAP PRO   IPAP 25 cmw  EPAP 21 cmw.  This pt did not get adequately re-titrated onto ASV to order obtain a replacement ASV machine.  Pt will be seen in 8 weeks to do an initial compliance check and at that time how well he uses this device will be evaluated.  The compliance schedule was discussed with this patient.      The risks of untreated sleep apnea were discussed with the patient at length. Patients with JEAN are at increased risk of cardiovascular disease including coronary artery disease, systemic arterial hypertension, pulmonary arterial hypertension, cardiac arrhythmias, and stroke.

## 2021-06-09 ENCOUNTER — OFFICE VISIT (OUTPATIENT)
Dept: HEALTH INFORMATION MANAGEMENT | Facility: MEDICAL CENTER | Age: 26
End: 2021-06-09
Payer: MEDICAID

## 2021-06-09 VITALS
HEIGHT: 71 IN | HEART RATE: 84 BPM | OXYGEN SATURATION: 93 % | DIASTOLIC BLOOD PRESSURE: 86 MMHG | SYSTOLIC BLOOD PRESSURE: 130 MMHG | WEIGHT: 315 LBS | BODY MASS INDEX: 44.1 KG/M2

## 2021-06-09 DIAGNOSIS — F31.62 BIPOLAR DISORDER, CURRENT EPISODE MIXED, MODERATE (HCC): ICD-10-CM

## 2021-06-09 DIAGNOSIS — E66.01 MORBID OBESITY (HCC): ICD-10-CM

## 2021-06-09 DIAGNOSIS — E78.00 HYPERCHOLESTEROLEMIA: ICD-10-CM

## 2021-06-09 DIAGNOSIS — E88.810 METABOLIC SYNDROME: ICD-10-CM

## 2021-06-09 DIAGNOSIS — I10 ESSENTIAL HYPERTENSION: ICD-10-CM

## 2021-06-09 DIAGNOSIS — R63.2 BINGE EATING: ICD-10-CM

## 2021-06-09 PROCEDURE — 99214 OFFICE O/P EST MOD 30 MIN: CPT | Performed by: INTERNAL MEDICINE

## 2021-06-09 RX ORDER — TOPIRAMATE 50 MG/1
50 TABLET, FILM COATED ORAL EVERY EVENING
Qty: 30 TABLET | Refills: 1 | Status: SHIPPED | OUTPATIENT
Start: 2021-06-09 | End: 2022-06-12

## 2021-06-09 ASSESSMENT — PATIENT HEALTH QUESTIONNAIRE - PHQ9: CLINICAL INTERPRETATION OF PHQ2 SCORE: 0

## 2021-06-09 NOTE — PROGRESS NOTES
"Bariatric Medicine Follow Up  Chief Complaint   Patient presents with   • Weight Gain   • Obesity       History of Present Illness:   Adama Burt is a 25 y.o. male who presents for follow-up to intensive behavioral modification of overweight and to help address below co-morbidities caused by overweight.      Weight Management History to Date:  3/2/2021:  Wt regain  Not using MR or tracking intake  Walking more      10/1/2020:  Goal was less fast food, less bread and fries  More vegetables  Not using MR or tracking  Avoid skipping a.m. meal  Walking more at work  Considering GLP-1 agonist  Was to see Dr. Quiles for bariatric surgery evaluation but patient states insurance does not cover    ___    Challenges since last visit:  Recent hospitalization d/t PE, CHF  Planning now on BS with Dr. Quiles  Dietary adherence:  Better  Wants to have just two  salads per day from [employer]  Reducing milk intake  No more fast food  Exercise:  Walking at least 1 hr daily    AntiObesity Medication use: none  Never got Ozempic  Medication efficacy/tolerance/side effects: n/a  Medication compliance: n/a    Status of comorbid conditions/other medical diagnoses:  HLD/Tg:  Uncontrolled, not on fenofibrate  HTN:  Uncontrolled at times, not on antihypertensive  Bipolar:  Controlled now on abilify, tegretol  JEAN:  Uncontrolled, to get cpap next week       Physical Exam:    /86 (BP Location: Left arm, Patient Position: Sitting, BP Cuff Size: Large adult long)   Pulse 84   Ht 1.803 m (5' 11\")   Wt (!) 229 kg (503 lb 12.8 oz)   SpO2 93%   BMI 70.27 kg/m²   Waist Measurement   Waist: 71 inch/inches  Weight change since last visit:  -12.5 lb (+32.6 lb total)  Waist Circum change since last visit:  -0.5 in (0 in total)    Physical findings unchanged from prior exam.    Laboratory:   Recent labs reviewed.     ASSESSMENT  25 y.o.  male presents for intensive lifestyle intervention for treatment of obesity and co-morbid " conditions.  Obesity Stage ([place])/Class: 3/3    1. Binge eating  topiramate (TOPAMAX) 50 MG tablet   2. Hypercholesterolemia     3. Metabolic syndrome     4. Essential hypertension     5. Morbid obesity (HCC)     6. Bipolar disorder, current episode mixed, moderate (HCC)         Pt committed now to bariatric surgery, which is next step for him given BMI and inability to lose wt with MWL.   Will add AOM to aid in presurgical wt loss.  Start stimulus narrowing with whole food.  Continue to monitor lipids, BP, mood, currently stable.  Increase activity, as he is doing, to aid in wt loss efforts.    PLAN  Weight Goal:  300 lb  Dietary intervention:    MR:  0  High Protein/Low Carb whole food meals and 2 snacks between meals daily  Not tracking intake  64+ oz water per day  Physical Activity:  Walking 60 min daily  Labs:  Obtain last labs from [facility]  Rx changes:   Start topiramate 50 mg qpm to reduce pm cravings, per pt request  Could not get ozempic covered by insurance  Behavior modification:   Continue increasing activity  Surgical considerations:  Pending BS with Dr. Quiles 8/2021  Follow-up: one month with MD  See also recent RD notes and follow-up.      Patient's body mass index is 70.27 kg/m². Exercise and nutrition counseling were performed at this visit.

## 2021-07-02 ENCOUNTER — HOSPITAL ENCOUNTER (OUTPATIENT)
Dept: LAB | Facility: MEDICAL CENTER | Age: 26
End: 2021-07-02
Attending: COLON & RECTAL SURGERY
Payer: MEDICAID

## 2021-07-02 LAB
ALBUMIN SERPL BCP-MCNC: 4.1 G/DL (ref 3.2–4.9)
ALBUMIN/GLOB SERPL: 1.4 G/DL
ALP SERPL-CCNC: 95 U/L (ref 30–99)
ALT SERPL-CCNC: 43 U/L (ref 2–50)
ANION GAP SERPL CALC-SCNC: 9 MMOL/L (ref 7–16)
AST SERPL-CCNC: 38 U/L (ref 12–45)
BASOPHILS # BLD AUTO: 1.1 % (ref 0–1.8)
BASOPHILS # BLD: 0.1 K/UL (ref 0–0.12)
BILIRUB SERPL-MCNC: 0.5 MG/DL (ref 0.1–1.5)
BUN SERPL-MCNC: 14 MG/DL (ref 8–22)
CALCIUM SERPL-MCNC: 9 MG/DL (ref 8.5–10.5)
CHLORIDE SERPL-SCNC: 104 MMOL/L (ref 96–112)
CO2 SERPL-SCNC: 25 MMOL/L (ref 20–33)
CREAT SERPL-MCNC: 0.9 MG/DL (ref 0.5–1.4)
EOSINOPHIL # BLD AUTO: 0.19 K/UL (ref 0–0.51)
EOSINOPHIL NFR BLD: 2.1 % (ref 0–6.9)
ERYTHROCYTE [DISTWIDTH] IN BLOOD BY AUTOMATED COUNT: 48.7 FL (ref 35.9–50)
EST. AVERAGE GLUCOSE BLD GHB EST-MCNC: 103 MG/DL
GLOBULIN SER CALC-MCNC: 3 G/DL (ref 1.9–3.5)
GLUCOSE SERPL-MCNC: 79 MG/DL (ref 65–99)
HBA1C MFR BLD: 5.2 % (ref 4–5.6)
HCT VFR BLD AUTO: 52.1 % (ref 42–52)
HGB BLD-MCNC: 16.5 G/DL (ref 14–18)
IMM GRANULOCYTES # BLD AUTO: 0.07 K/UL (ref 0–0.11)
IMM GRANULOCYTES NFR BLD AUTO: 0.8 % (ref 0–0.9)
LYMPHOCYTES # BLD AUTO: 1.98 K/UL (ref 1–4.8)
LYMPHOCYTES NFR BLD: 22 % (ref 22–41)
MCH RBC QN AUTO: 28.4 PG (ref 27–33)
MCHC RBC AUTO-ENTMCNC: 31.7 G/DL (ref 33.7–35.3)
MCV RBC AUTO: 89.8 FL (ref 81.4–97.8)
MONOCYTES # BLD AUTO: 0.66 K/UL (ref 0–0.85)
MONOCYTES NFR BLD AUTO: 7.3 % (ref 0–13.4)
NEUTROPHILS # BLD AUTO: 5.99 K/UL (ref 1.82–7.42)
NEUTROPHILS NFR BLD: 66.7 % (ref 44–72)
NRBC # BLD AUTO: 0 K/UL
NRBC BLD-RTO: 0 /100 WBC
PLATELET # BLD AUTO: 239 K/UL (ref 164–446)
PMV BLD AUTO: 11.9 FL (ref 9–12.9)
POTASSIUM SERPL-SCNC: 4.2 MMOL/L (ref 3.6–5.5)
PROT SERPL-MCNC: 7.1 G/DL (ref 6–8.2)
RBC # BLD AUTO: 5.8 M/UL (ref 4.7–6.1)
SODIUM SERPL-SCNC: 138 MMOL/L (ref 135–145)
TSH SERPL DL<=0.005 MIU/L-ACNC: 0.65 UIU/ML (ref 0.38–5.33)
WBC # BLD AUTO: 9 K/UL (ref 4.8–10.8)

## 2021-07-02 PROCEDURE — 84443 ASSAY THYROID STIM HORMONE: CPT

## 2021-07-02 PROCEDURE — 36415 COLL VENOUS BLD VENIPUNCTURE: CPT

## 2021-07-02 PROCEDURE — 85025 COMPLETE CBC W/AUTO DIFF WBC: CPT

## 2021-07-02 PROCEDURE — 80053 COMPREHEN METABOLIC PANEL: CPT

## 2021-07-02 PROCEDURE — 83036 HEMOGLOBIN GLYCOSYLATED A1C: CPT

## 2021-07-12 ENCOUNTER — HOSPITAL ENCOUNTER (OUTPATIENT)
Dept: RADIOLOGY | Facility: MEDICAL CENTER | Age: 26
End: 2021-07-12
Attending: COLON & RECTAL SURGERY
Payer: MEDICAID

## 2021-07-12 DIAGNOSIS — E66.01 MORBID OBESITY (HCC): ICD-10-CM

## 2021-07-12 DIAGNOSIS — Z72.4 INAPPROPRIATE DIET OR EATING HABITS: ICD-10-CM

## 2021-07-12 DIAGNOSIS — Z01.818 PREOP EXAMINATION: ICD-10-CM

## 2021-10-25 ENCOUNTER — PRE-ADMISSION TESTING (OUTPATIENT)
Dept: ADMISSIONS | Facility: MEDICAL CENTER | Age: 26
DRG: 621 | End: 2021-10-25
Attending: COLON & RECTAL SURGERY
Payer: MEDICAID

## 2021-10-25 DIAGNOSIS — Z01.812 PRE-OPERATIVE LABORATORY EXAMINATION: ICD-10-CM

## 2021-10-25 DIAGNOSIS — Z01.810 PRE-OPERATIVE CARDIOVASCULAR EXAMINATION: ICD-10-CM

## 2021-10-25 LAB
ALBUMIN SERPL BCP-MCNC: 4.3 G/DL (ref 3.2–4.9)
ALBUMIN/GLOB SERPL: 1.4 G/DL
ALP SERPL-CCNC: 114 U/L (ref 30–99)
ALT SERPL-CCNC: 28 U/L (ref 2–50)
ANION GAP SERPL CALC-SCNC: 11 MMOL/L (ref 7–16)
AST SERPL-CCNC: 12 U/L (ref 12–45)
BASOPHILS # BLD AUTO: 0.9 % (ref 0–1.8)
BASOPHILS # BLD: 0.09 K/UL (ref 0–0.12)
BILIRUB SERPL-MCNC: 0.6 MG/DL (ref 0.1–1.5)
BUN SERPL-MCNC: 22 MG/DL (ref 8–22)
CALCIUM SERPL-MCNC: 9.6 MG/DL (ref 8.5–10.5)
CHLORIDE SERPL-SCNC: 102 MMOL/L (ref 96–112)
CO2 SERPL-SCNC: 25 MMOL/L (ref 20–33)
CREAT SERPL-MCNC: 0.89 MG/DL (ref 0.5–1.4)
EKG IMPRESSION: NORMAL
EOSINOPHIL # BLD AUTO: 0.12 K/UL (ref 0–0.51)
EOSINOPHIL NFR BLD: 1.2 % (ref 0–6.9)
ERYTHROCYTE [DISTWIDTH] IN BLOOD BY AUTOMATED COUNT: 43.2 FL (ref 35.9–50)
GLOBULIN SER CALC-MCNC: 3.1 G/DL (ref 1.9–3.5)
GLUCOSE SERPL-MCNC: 86 MG/DL (ref 65–99)
HCT VFR BLD AUTO: 47.9 % (ref 42–52)
HGB BLD-MCNC: 15.9 G/DL (ref 14–18)
IMM GRANULOCYTES # BLD AUTO: 0.04 K/UL (ref 0–0.11)
IMM GRANULOCYTES NFR BLD AUTO: 0.4 % (ref 0–0.9)
INR PPP: 1.08 (ref 0.87–1.13)
LYMPHOCYTES # BLD AUTO: 1.94 K/UL (ref 1–4.8)
LYMPHOCYTES NFR BLD: 19.2 % (ref 22–41)
MCH RBC QN AUTO: 28.9 PG (ref 27–33)
MCHC RBC AUTO-ENTMCNC: 33.2 G/DL (ref 33.7–35.3)
MCV RBC AUTO: 87.1 FL (ref 81.4–97.8)
MONOCYTES # BLD AUTO: 0.72 K/UL (ref 0–0.85)
MONOCYTES NFR BLD AUTO: 7.1 % (ref 0–13.4)
NEUTROPHILS # BLD AUTO: 7.21 K/UL (ref 1.82–7.42)
NEUTROPHILS NFR BLD: 71.2 % (ref 44–72)
NRBC # BLD AUTO: 0 K/UL
NRBC BLD-RTO: 0 /100 WBC
PLATELET # BLD AUTO: 253 K/UL (ref 164–446)
PMV BLD AUTO: 11.1 FL (ref 9–12.9)
POTASSIUM SERPL-SCNC: 3.9 MMOL/L (ref 3.6–5.5)
PROT SERPL-MCNC: 7.4 G/DL (ref 6–8.2)
PROTHROMBIN TIME: 13.7 SEC (ref 12–14.6)
RBC # BLD AUTO: 5.5 M/UL (ref 4.7–6.1)
SARS-COV-2 RNA RESP QL NAA+PROBE: NOTDETECTED
SODIUM SERPL-SCNC: 138 MMOL/L (ref 135–145)
SPECIMEN SOURCE: NORMAL
WBC # BLD AUTO: 10.1 K/UL (ref 4.8–10.8)

## 2021-10-25 PROCEDURE — 85610 PROTHROMBIN TIME: CPT

## 2021-10-25 PROCEDURE — 93005 ELECTROCARDIOGRAM TRACING: CPT

## 2021-10-25 PROCEDURE — 93010 ELECTROCARDIOGRAM REPORT: CPT | Performed by: INTERNAL MEDICINE

## 2021-10-25 PROCEDURE — 80053 COMPREHEN METABOLIC PANEL: CPT

## 2021-10-25 PROCEDURE — 85025 COMPLETE CBC W/AUTO DIFF WBC: CPT

## 2021-10-25 PROCEDURE — C9803 HOPD COVID-19 SPEC COLLECT: HCPCS

## 2021-10-25 PROCEDURE — 36415 COLL VENOUS BLD VENIPUNCTURE: CPT

## 2021-10-25 PROCEDURE — U0005 INFEC AGEN DETEC AMPLI PROBE: HCPCS

## 2021-10-25 PROCEDURE — U0003 INFECTIOUS AGENT DETECTION BY NUCLEIC ACID (DNA OR RNA); SEVERE ACUTE RESPIRATORY SYNDROME CORONAVIRUS 2 (SARS-COV-2) (CORONAVIRUS DISEASE [COVID-19]), AMPLIFIED PROBE TECHNIQUE, MAKING USE OF HIGH THROUGHPUT TECHNOLOGIES AS DESCRIBED BY CMS-2020-01-R: HCPCS

## 2021-10-25 RX ORDER — HYDROCHLOROTHIAZIDE 25 MG/1
25 TABLET ORAL EVERY MORNING
Status: ON HOLD | COMMUNITY
End: 2022-04-21

## 2021-10-25 RX ORDER — LOSARTAN POTASSIUM 100 MG/1
100 TABLET ORAL EVERY MORNING
Status: ON HOLD | COMMUNITY
Start: 2021-10-01 | End: 2022-04-21

## 2021-10-25 RX ORDER — TRAZODONE HYDROCHLORIDE 50 MG/1
50 TABLET ORAL
COMMUNITY
End: 2022-06-12

## 2021-10-25 RX ORDER — ATORVASTATIN CALCIUM 20 MG/1
20 TABLET, FILM COATED ORAL EVERY MORNING
COMMUNITY
End: 2022-06-12

## 2021-10-25 RX ORDER — ACETAMINOPHEN 325 MG/1
650 TABLET ORAL EVERY 6 HOURS PRN
Status: ON HOLD | COMMUNITY
End: 2021-10-29

## 2021-10-25 ASSESSMENT — FIBROSIS 4 INDEX: FIB4 SCORE: 0.68

## 2021-10-29 ENCOUNTER — HOSPITAL ENCOUNTER (INPATIENT)
Facility: MEDICAL CENTER | Age: 26
LOS: 1 days | DRG: 621 | End: 2021-10-30
Attending: COLON & RECTAL SURGERY | Admitting: COLON & RECTAL SURGERY
Payer: MEDICAID

## 2021-10-29 ENCOUNTER — ANESTHESIA EVENT (OUTPATIENT)
Dept: SURGERY | Facility: MEDICAL CENTER | Age: 26
DRG: 621 | End: 2021-10-29
Payer: MEDICAID

## 2021-10-29 ENCOUNTER — ANESTHESIA (OUTPATIENT)
Dept: SURGERY | Facility: MEDICAL CENTER | Age: 26
DRG: 621 | End: 2021-10-29
Payer: MEDICAID

## 2021-10-29 ENCOUNTER — APPOINTMENT (OUTPATIENT)
Dept: RADIOLOGY | Facility: MEDICAL CENTER | Age: 26
DRG: 621 | End: 2021-10-29
Attending: COLON & RECTAL SURGERY
Payer: MEDICAID

## 2021-10-29 LAB
PATHOLOGY CONSULT NOTE: NORMAL
PATHOLOGY CONSULT NOTE: NORMAL

## 2021-10-29 PROCEDURE — 0DB64Z3 EXCISION OF STOMACH, PERCUTANEOUS ENDOSCOPIC APPROACH, VERTICAL: ICD-10-PCS | Performed by: COLON & RECTAL SURGERY

## 2021-10-29 PROCEDURE — 501497 HCHG SURGICLIP: Performed by: COLON & RECTAL SURGERY

## 2021-10-29 PROCEDURE — 501838 HCHG SUTURE GENERAL: Performed by: COLON & RECTAL SURGERY

## 2021-10-29 PROCEDURE — 700101 HCHG RX REV CODE 250: Performed by: COLON & RECTAL SURGERY

## 2021-10-29 PROCEDURE — 700105 HCHG RX REV CODE 258: Performed by: STUDENT IN AN ORGANIZED HEALTH CARE EDUCATION/TRAINING PROGRAM

## 2021-10-29 PROCEDURE — 160036 HCHG PACU - EA ADDL 30 MINS PHASE I: Performed by: COLON & RECTAL SURGERY

## 2021-10-29 PROCEDURE — 160035 HCHG PACU - 1ST 60 MINS PHASE I: Performed by: COLON & RECTAL SURGERY

## 2021-10-29 PROCEDURE — 160041 HCHG SURGERY MINUTES - EA ADDL 1 MIN LEVEL 4: Performed by: COLON & RECTAL SURGERY

## 2021-10-29 PROCEDURE — 700101 HCHG RX REV CODE 250: Performed by: ANESTHESIOLOGY

## 2021-10-29 PROCEDURE — A9270 NON-COVERED ITEM OR SERVICE: HCPCS | Performed by: ANESTHESIOLOGY

## 2021-10-29 PROCEDURE — 88307 TISSUE EXAM BY PATHOLOGIST: CPT

## 2021-10-29 PROCEDURE — 700102 HCHG RX REV CODE 250 W/ 637 OVERRIDE(OP): Performed by: STUDENT IN AN ORGANIZED HEALTH CARE EDUCATION/TRAINING PROGRAM

## 2021-10-29 PROCEDURE — 700111 HCHG RX REV CODE 636 W/ 250 OVERRIDE (IP): Performed by: ANESTHESIOLOGY

## 2021-10-29 PROCEDURE — 501570 HCHG TROCAR, SEPARATOR: Performed by: COLON & RECTAL SURGERY

## 2021-10-29 PROCEDURE — 88312 SPECIAL STAINS GROUP 1: CPT

## 2021-10-29 PROCEDURE — 501583 HCHG TROCAR, THRD CAN&SEAL 5X100: Performed by: COLON & RECTAL SURGERY

## 2021-10-29 PROCEDURE — 700102 HCHG RX REV CODE 250 W/ 637 OVERRIDE(OP): Performed by: COLON & RECTAL SURGERY

## 2021-10-29 PROCEDURE — 770006 HCHG ROOM/CARE - MED/SURG/GYN SEMI*

## 2021-10-29 PROCEDURE — 700105 HCHG RX REV CODE 258: Performed by: COLON & RECTAL SURGERY

## 2021-10-29 PROCEDURE — 160048 HCHG OR STATISTICAL LEVEL 1-5: Performed by: COLON & RECTAL SURGERY

## 2021-10-29 PROCEDURE — 160009 HCHG ANES TIME/MIN: Performed by: COLON & RECTAL SURGERY

## 2021-10-29 PROCEDURE — 700102 HCHG RX REV CODE 250 W/ 637 OVERRIDE(OP): Performed by: ANESTHESIOLOGY

## 2021-10-29 PROCEDURE — A9270 NON-COVERED ITEM OR SERVICE: HCPCS | Performed by: COLON & RECTAL SURGERY

## 2021-10-29 PROCEDURE — 501399 HCHG SPECIMAN BAG, ENDO CATC: Performed by: COLON & RECTAL SURGERY

## 2021-10-29 PROCEDURE — 160029 HCHG SURGERY MINUTES - 1ST 30 MINS LEVEL 4: Performed by: COLON & RECTAL SURGERY

## 2021-10-29 PROCEDURE — 502240 HCHG MISC OR SUPPLY RC 0272: Performed by: COLON & RECTAL SURGERY

## 2021-10-29 PROCEDURE — 501338 HCHG SHEARS, ENDO: Performed by: COLON & RECTAL SURGERY

## 2021-10-29 PROCEDURE — 502570 HCHG PACK, GASTRIC BANDING: Performed by: COLON & RECTAL SURGERY

## 2021-10-29 PROCEDURE — A9270 NON-COVERED ITEM OR SERVICE: HCPCS | Performed by: STUDENT IN AN ORGANIZED HEALTH CARE EDUCATION/TRAINING PROGRAM

## 2021-10-29 PROCEDURE — 700111 HCHG RX REV CODE 636 W/ 250 OVERRIDE (IP): Performed by: STUDENT IN AN ORGANIZED HEALTH CARE EDUCATION/TRAINING PROGRAM

## 2021-10-29 PROCEDURE — 700111 HCHG RX REV CODE 636 W/ 250 OVERRIDE (IP): Performed by: COLON & RECTAL SURGERY

## 2021-10-29 PROCEDURE — 160002 HCHG RECOVERY MINUTES (STAT): Performed by: COLON & RECTAL SURGERY

## 2021-10-29 RX ORDER — GABAPENTIN 300 MG/1
300 CAPSULE ORAL
Status: COMPLETED | OUTPATIENT
Start: 2021-10-29 | End: 2021-10-29

## 2021-10-29 RX ORDER — BUPIVACAINE HYDROCHLORIDE AND EPINEPHRINE 5; 5 MG/ML; UG/ML
INJECTION, SOLUTION PERINEURAL
Status: DISCONTINUED | OUTPATIENT
Start: 2021-10-29 | End: 2021-10-29 | Stop reason: HOSPADM

## 2021-10-29 RX ORDER — ONDANSETRON 2 MG/ML
INJECTION INTRAMUSCULAR; INTRAVENOUS PRN
Status: DISCONTINUED | OUTPATIENT
Start: 2021-10-29 | End: 2021-10-29 | Stop reason: SURG

## 2021-10-29 RX ORDER — GABAPENTIN 300 MG/1
300 CAPSULE ORAL 3 TIMES DAILY
Status: DISCONTINUED | OUTPATIENT
Start: 2021-10-29 | End: 2021-10-30 | Stop reason: HOSPADM

## 2021-10-29 RX ORDER — CALCIUM CARBONATE 500 MG/1
500 TABLET, CHEWABLE ORAL
Status: DISCONTINUED | OUTPATIENT
Start: 2021-10-29 | End: 2021-10-30 | Stop reason: HOSPADM

## 2021-10-29 RX ORDER — ACETAMINOPHEN 10 MG/ML
1000 INJECTION, SOLUTION INTRAVENOUS EVERY 6 HOURS
Status: DISPENSED | OUTPATIENT
Start: 2021-10-29 | End: 2021-10-29

## 2021-10-29 RX ORDER — OXYMETAZOLINE HYDROCHLORIDE 0.05 G/100ML
SPRAY NASAL PRN
Status: DISCONTINUED | OUTPATIENT
Start: 2021-10-29 | End: 2021-10-29 | Stop reason: SURG

## 2021-10-29 RX ORDER — ACETAMINOPHEN 500 MG
1000 TABLET ORAL EVERY 6 HOURS
Status: DISCONTINUED | OUTPATIENT
Start: 2021-10-30 | End: 2021-10-30 | Stop reason: HOSPADM

## 2021-10-29 RX ORDER — HALOPERIDOL 5 MG/ML
1 INJECTION INTRAMUSCULAR
Status: DISCONTINUED | OUTPATIENT
Start: 2021-10-29 | End: 2021-10-29 | Stop reason: HOSPADM

## 2021-10-29 RX ORDER — DIPHENHYDRAMINE HCL 25 MG
25 TABLET ORAL EVERY 6 HOURS PRN
Status: DISCONTINUED | OUTPATIENT
Start: 2021-10-29 | End: 2021-10-30 | Stop reason: HOSPADM

## 2021-10-29 RX ORDER — HYDROMORPHONE HYDROCHLORIDE 1 MG/ML
0.4 INJECTION, SOLUTION INTRAMUSCULAR; INTRAVENOUS; SUBCUTANEOUS
Status: DISCONTINUED | OUTPATIENT
Start: 2021-10-29 | End: 2021-10-29 | Stop reason: HOSPADM

## 2021-10-29 RX ORDER — SODIUM CHLORIDE, SODIUM LACTATE, POTASSIUM CHLORIDE, CALCIUM CHLORIDE 600; 310; 30; 20 MG/100ML; MG/100ML; MG/100ML; MG/100ML
INJECTION, SOLUTION INTRAVENOUS CONTINUOUS
Status: ACTIVE | OUTPATIENT
Start: 2021-10-29 | End: 2021-10-29

## 2021-10-29 RX ORDER — DEXAMETHASONE SODIUM PHOSPHATE 4 MG/ML
INJECTION, SOLUTION INTRA-ARTICULAR; INTRALESIONAL; INTRAMUSCULAR; INTRAVENOUS; SOFT TISSUE PRN
Status: DISCONTINUED | OUTPATIENT
Start: 2021-10-29 | End: 2021-10-29 | Stop reason: SURG

## 2021-10-29 RX ORDER — ACETAMINOPHEN 500 MG
1000 TABLET ORAL 2 TIMES DAILY PRN
COMMUNITY
End: 2022-06-12

## 2021-10-29 RX ORDER — ROCURONIUM BROMIDE 10 MG/ML
INJECTION, SOLUTION INTRAVENOUS PRN
Status: DISCONTINUED | OUTPATIENT
Start: 2021-10-29 | End: 2021-10-29 | Stop reason: SURG

## 2021-10-29 RX ORDER — PROMETHAZINE HYDROCHLORIDE 25 MG/1
25 SUPPOSITORY RECTAL EVERY 4 HOURS PRN
Status: DISCONTINUED | OUTPATIENT
Start: 2021-10-29 | End: 2021-10-30 | Stop reason: HOSPADM

## 2021-10-29 RX ORDER — OXYCODONE HCL 10 MG/1
10 TABLET, FILM COATED, EXTENDED RELEASE ORAL
Status: COMPLETED | OUTPATIENT
Start: 2021-10-29 | End: 2021-10-29

## 2021-10-29 RX ORDER — SCOLOPAMINE TRANSDERMAL SYSTEM 1 MG/1
1 PATCH, EXTENDED RELEASE TRANSDERMAL
Status: DISCONTINUED | OUTPATIENT
Start: 2021-10-29 | End: 2021-10-30 | Stop reason: HOSPADM

## 2021-10-29 RX ORDER — DIPHENHYDRAMINE HYDROCHLORIDE 50 MG/ML
12.5 INJECTION INTRAMUSCULAR; INTRAVENOUS
Status: DISCONTINUED | OUTPATIENT
Start: 2021-10-29 | End: 2021-10-29 | Stop reason: HOSPADM

## 2021-10-29 RX ORDER — CARBAMAZEPINE 200 MG/1
200 TABLET ORAL 2 TIMES DAILY
COMMUNITY
End: 2022-06-12

## 2021-10-29 RX ORDER — HYDROMORPHONE HYDROCHLORIDE 1 MG/ML
0.2 INJECTION, SOLUTION INTRAMUSCULAR; INTRAVENOUS; SUBCUTANEOUS
Status: DISCONTINUED | OUTPATIENT
Start: 2021-10-29 | End: 2021-10-29 | Stop reason: HOSPADM

## 2021-10-29 RX ORDER — ACETAMINOPHEN 10 MG/ML
1 INJECTION, SOLUTION INTRAVENOUS
Status: COMPLETED | OUTPATIENT
Start: 2021-10-29 | End: 2021-10-29

## 2021-10-29 RX ORDER — MEPERIDINE HYDROCHLORIDE 25 MG/ML
12.5 INJECTION INTRAMUSCULAR; INTRAVENOUS; SUBCUTANEOUS
Status: DISCONTINUED | OUTPATIENT
Start: 2021-10-29 | End: 2021-10-29 | Stop reason: HOSPADM

## 2021-10-29 RX ORDER — DIPHENHYDRAMINE HYDROCHLORIDE 50 MG/ML
12.5 INJECTION INTRAMUSCULAR; INTRAVENOUS EVERY 6 HOURS PRN
Status: DISCONTINUED | OUTPATIENT
Start: 2021-10-29 | End: 2021-10-30 | Stop reason: HOSPADM

## 2021-10-29 RX ORDER — HYDROMORPHONE HYDROCHLORIDE 1 MG/ML
0.1 INJECTION, SOLUTION INTRAMUSCULAR; INTRAVENOUS; SUBCUTANEOUS
Status: DISCONTINUED | OUTPATIENT
Start: 2021-10-29 | End: 2021-10-29 | Stop reason: HOSPADM

## 2021-10-29 RX ORDER — CEFAZOLIN SODIUM 1 G/3ML
INJECTION, POWDER, FOR SOLUTION INTRAMUSCULAR; INTRAVENOUS PRN
Status: DISCONTINUED | OUTPATIENT
Start: 2021-10-29 | End: 2021-10-29 | Stop reason: SURG

## 2021-10-29 RX ORDER — OXYCODONE HYDROCHLORIDE AND ACETAMINOPHEN 5; 325 MG/1; MG/1
2 TABLET ORAL
Status: COMPLETED | OUTPATIENT
Start: 2021-10-29 | End: 2021-10-29

## 2021-10-29 RX ORDER — LIDOCAINE HYDROCHLORIDE 20 MG/ML
INJECTION, SOLUTION EPIDURAL; INFILTRATION; INTRACAUDAL; PERINEURAL PRN
Status: DISCONTINUED | OUTPATIENT
Start: 2021-10-29 | End: 2021-10-29 | Stop reason: SURG

## 2021-10-29 RX ORDER — HYDROCHLOROTHIAZIDE 25 MG/1
25 TABLET ORAL EVERY MORNING
Status: DISCONTINUED | OUTPATIENT
Start: 2021-10-29 | End: 2021-10-30 | Stop reason: HOSPADM

## 2021-10-29 RX ORDER — OXYCODONE HCL 5 MG/5 ML
5 SOLUTION, ORAL ORAL
Status: DISCONTINUED | OUTPATIENT
Start: 2021-10-29 | End: 2021-10-30 | Stop reason: HOSPADM

## 2021-10-29 RX ORDER — SODIUM CHLORIDE, SODIUM LACTATE, POTASSIUM CHLORIDE, CALCIUM CHLORIDE 600; 310; 30; 20 MG/100ML; MG/100ML; MG/100ML; MG/100ML
INJECTION, SOLUTION INTRAVENOUS CONTINUOUS
Status: DISCONTINUED | OUTPATIENT
Start: 2021-10-29 | End: 2021-10-29 | Stop reason: HOSPADM

## 2021-10-29 RX ORDER — NYSTATIN 100000 [USP'U]/G
1 POWDER TOPICAL 4 TIMES DAILY
COMMUNITY
End: 2022-06-12

## 2021-10-29 RX ORDER — LABETALOL HYDROCHLORIDE 5 MG/ML
5 INJECTION, SOLUTION INTRAVENOUS
Status: DISCONTINUED | OUTPATIENT
Start: 2021-10-29 | End: 2021-10-29 | Stop reason: HOSPADM

## 2021-10-29 RX ORDER — SIMETHICONE 80 MG
80 TABLET,CHEWABLE ORAL 3 TIMES DAILY PRN
Status: DISCONTINUED | OUTPATIENT
Start: 2021-10-29 | End: 2021-10-30 | Stop reason: HOSPADM

## 2021-10-29 RX ORDER — OXYCODONE HYDROCHLORIDE AND ACETAMINOPHEN 5; 325 MG/1; MG/1
1 TABLET ORAL
Status: COMPLETED | OUTPATIENT
Start: 2021-10-29 | End: 2021-10-29

## 2021-10-29 RX ORDER — OXYCODONE HCL 5 MG/5 ML
10 SOLUTION, ORAL ORAL
Status: DISCONTINUED | OUTPATIENT
Start: 2021-10-29 | End: 2021-10-30 | Stop reason: HOSPADM

## 2021-10-29 RX ORDER — CARBAMAZEPINE 200 MG/1
200 TABLET ORAL 2 TIMES DAILY
Status: DISCONTINUED | OUTPATIENT
Start: 2021-10-29 | End: 2021-10-30 | Stop reason: HOSPADM

## 2021-10-29 RX ORDER — LOSARTAN POTASSIUM 50 MG/1
100 TABLET ORAL EVERY MORNING
Status: DISCONTINUED | OUTPATIENT
Start: 2021-10-29 | End: 2021-10-30 | Stop reason: HOSPADM

## 2021-10-29 RX ORDER — ONDANSETRON 2 MG/ML
4 INJECTION INTRAMUSCULAR; INTRAVENOUS
Status: COMPLETED | OUTPATIENT
Start: 2021-10-29 | End: 2021-10-29

## 2021-10-29 RX ORDER — HALOPERIDOL 5 MG/ML
1 INJECTION INTRAMUSCULAR EVERY 6 HOURS PRN
Status: DISCONTINUED | OUTPATIENT
Start: 2021-10-29 | End: 2021-10-30 | Stop reason: HOSPADM

## 2021-10-29 RX ORDER — ONDANSETRON 2 MG/ML
4 INJECTION INTRAMUSCULAR; INTRAVENOUS EVERY 4 HOURS PRN
Status: DISCONTINUED | OUTPATIENT
Start: 2021-10-29 | End: 2021-10-30 | Stop reason: HOSPADM

## 2021-10-29 RX ORDER — PHENYLEPHRINE HCL IN 0.9% NACL 0.5 MG/5ML
SYRINGE (ML) INTRAVENOUS PRN
Status: DISCONTINUED | OUTPATIENT
Start: 2021-10-29 | End: 2021-10-29 | Stop reason: SURG

## 2021-10-29 RX ORDER — HYDROMORPHONE HYDROCHLORIDE 1 MG/ML
0.5 INJECTION, SOLUTION INTRAMUSCULAR; INTRAVENOUS; SUBCUTANEOUS
Status: DISCONTINUED | OUTPATIENT
Start: 2021-10-29 | End: 2021-10-30 | Stop reason: HOSPADM

## 2021-10-29 RX ORDER — DIPHENHYDRAMINE HYDROCHLORIDE 50 MG/ML
25 INJECTION INTRAMUSCULAR; INTRAVENOUS EVERY 6 HOURS PRN
Status: DISCONTINUED | OUTPATIENT
Start: 2021-10-29 | End: 2021-10-30 | Stop reason: HOSPADM

## 2021-10-29 RX ORDER — ACETAMINOPHEN 500 MG
1000 TABLET ORAL EVERY 6 HOURS PRN
Status: DISCONTINUED | OUTPATIENT
Start: 2021-11-03 | End: 2021-10-30 | Stop reason: HOSPADM

## 2021-10-29 RX ORDER — HYDRALAZINE HYDROCHLORIDE 20 MG/ML
5 INJECTION INTRAMUSCULAR; INTRAVENOUS
Status: DISCONTINUED | OUTPATIENT
Start: 2021-10-29 | End: 2021-10-29 | Stop reason: HOSPADM

## 2021-10-29 RX ORDER — IPRATROPIUM BROMIDE AND ALBUTEROL SULFATE 2.5; .5 MG/3ML; MG/3ML
3 SOLUTION RESPIRATORY (INHALATION)
Status: DISCONTINUED | OUTPATIENT
Start: 2021-10-29 | End: 2021-10-29 | Stop reason: HOSPADM

## 2021-10-29 RX ADMIN — POTASSIUM CHLORIDE: 2 INJECTION, SOLUTION, CONCENTRATE INTRAVENOUS at 14:30

## 2021-10-29 RX ADMIN — GABAPENTIN 300 MG: 300 CAPSULE ORAL at 07:38

## 2021-10-29 RX ADMIN — FENTANYL CITRATE 100 MCG: 50 INJECTION, SOLUTION INTRAMUSCULAR; INTRAVENOUS at 09:14

## 2021-10-29 RX ADMIN — OXYCODONE HYDROCHLORIDE 10 MG: 10 TABLET, FILM COATED, EXTENDED RELEASE ORAL at 07:38

## 2021-10-29 RX ADMIN — ACETAMINOPHEN 1 G: 10 INJECTION, SOLUTION INTRAVENOUS at 07:38

## 2021-10-29 RX ADMIN — FENTANYL CITRATE 50 MCG: 50 INJECTION, SOLUTION INTRAMUSCULAR; INTRAVENOUS at 10:18

## 2021-10-29 RX ADMIN — Medication 200 MCG: at 09:14

## 2021-10-29 RX ADMIN — FENTANYL CITRATE 100 MCG: 50 INJECTION, SOLUTION INTRAMUSCULAR; INTRAVENOUS at 08:45

## 2021-10-29 RX ADMIN — ACETAMINOPHEN 1000 MG: 10 INJECTION, SOLUTION INTRAVENOUS at 17:08

## 2021-10-29 RX ADMIN — PROPOFOL 250 MG: 10 INJECTION, EMULSION INTRAVENOUS at 08:49

## 2021-10-29 RX ADMIN — ACETAMINOPHEN 1000 MG: 500 TABLET ORAL at 23:15

## 2021-10-29 RX ADMIN — FENTANYL CITRATE 50 MCG: 50 INJECTION, SOLUTION INTRAMUSCULAR; INTRAVENOUS at 10:12

## 2021-10-29 RX ADMIN — PROPOFOL 100 MG: 10 INJECTION, EMULSION INTRAVENOUS at 09:07

## 2021-10-29 RX ADMIN — ROCURONIUM BROMIDE 50 MG: 10 INJECTION, SOLUTION INTRAVENOUS at 08:49

## 2021-10-29 RX ADMIN — DEXAMETHASONE SODIUM PHOSPHATE 8 MG: 4 INJECTION, SOLUTION INTRA-ARTICULAR; INTRALESIONAL; INTRAMUSCULAR; INTRAVENOUS; SOFT TISSUE at 08:49

## 2021-10-29 RX ADMIN — LIDOCAINE HYDROCHLORIDE 100 MG: 20 INJECTION, SOLUTION EPIDURAL; INFILTRATION; INTRACAUDAL at 09:40

## 2021-10-29 RX ADMIN — LOSARTAN POTASSIUM 100 MG: 50 TABLET, FILM COATED ORAL at 14:33

## 2021-10-29 RX ADMIN — HYDROMORPHONE HYDROCHLORIDE 0.2 MG: 1 INJECTION, SOLUTION INTRAMUSCULAR; INTRAVENOUS; SUBCUTANEOUS at 13:00

## 2021-10-29 RX ADMIN — LIDOCAINE HYDROCHLORIDE 100 MG: 20 INJECTION, SOLUTION EPIDURAL; INFILTRATION; INTRACAUDAL at 08:49

## 2021-10-29 RX ADMIN — OXYCODONE HYDROCHLORIDE 5 MG: 5 SOLUTION ORAL at 17:23

## 2021-10-29 RX ADMIN — SUGAMMADEX 200 MG: 100 INJECTION, SOLUTION INTRAVENOUS at 09:38

## 2021-10-29 RX ADMIN — PROPOFOL 50 MG: 10 INJECTION, EMULSION INTRAVENOUS at 09:32

## 2021-10-29 RX ADMIN — CARBAMAZEPINE 200 MG: 200 TABLET ORAL at 17:08

## 2021-10-29 RX ADMIN — ONDANSETRON 4 MG: 2 INJECTION INTRAMUSCULAR; INTRAVENOUS at 08:49

## 2021-10-29 RX ADMIN — FAMOTIDINE 20 MG: 10 INJECTION INTRAVENOUS at 17:08

## 2021-10-29 RX ADMIN — GABAPENTIN 300 MG: 300 CAPSULE ORAL at 14:33

## 2021-10-29 RX ADMIN — HYDROMORPHONE HYDROCHLORIDE 0.4 MG: 1 INJECTION, SOLUTION INTRAMUSCULAR; INTRAVENOUS; SUBCUTANEOUS at 10:23

## 2021-10-29 RX ADMIN — OXYMETAZOLINE HCL 2 SPRAY: 0.05 SPRAY NASAL at 08:59

## 2021-10-29 RX ADMIN — CEFAZOLIN 3 G: 330 INJECTION, POWDER, FOR SOLUTION INTRAMUSCULAR; INTRAVENOUS at 08:49

## 2021-10-29 RX ADMIN — HYDROCHLOROTHIAZIDE 25 MG: 25 TABLET ORAL at 14:33

## 2021-10-29 RX ADMIN — GABAPENTIN 300 MG: 300 CAPSULE ORAL at 17:08

## 2021-10-29 RX ADMIN — POTASSIUM CHLORIDE: 2 INJECTION, SOLUTION, CONCENTRATE INTRAVENOUS at 23:27

## 2021-10-29 RX ADMIN — ONDANSETRON 4 MG: 2 INJECTION INTRAMUSCULAR; INTRAVENOUS at 10:13

## 2021-10-29 RX ADMIN — HYDROMORPHONE HYDROCHLORIDE 0.2 MG: 1 INJECTION, SOLUTION INTRAMUSCULAR; INTRAVENOUS; SUBCUTANEOUS at 12:06

## 2021-10-29 RX ADMIN — HYDROMORPHONE HYDROCHLORIDE 0.2 MG: 1 INJECTION, SOLUTION INTRAMUSCULAR; INTRAVENOUS; SUBCUTANEOUS at 11:08

## 2021-10-29 RX ADMIN — OXYCODONE HYDROCHLORIDE AND ACETAMINOPHEN 2 TABLET: 5; 325 TABLET ORAL at 10:17

## 2021-10-29 RX ADMIN — SODIUM CHLORIDE, POTASSIUM CHLORIDE, SODIUM LACTATE AND CALCIUM CHLORIDE: 600; 310; 30; 20 INJECTION, SOLUTION INTRAVENOUS at 07:38

## 2021-10-29 RX ADMIN — ONDANSETRON 4 MG: 2 INJECTION INTRAMUSCULAR; INTRAVENOUS at 23:19

## 2021-10-29 RX ADMIN — FENTANYL CITRATE 50 MCG: 50 INJECTION, SOLUTION INTRAMUSCULAR; INTRAVENOUS at 09:32

## 2021-10-29 RX ADMIN — PROPOFOL 100 MG: 10 INJECTION, EMULSION INTRAVENOUS at 09:47

## 2021-10-29 ASSESSMENT — COGNITIVE AND FUNCTIONAL STATUS - GENERAL
DRESSING REGULAR LOWER BODY CLOTHING: A LITTLE
STANDING UP FROM CHAIR USING ARMS: A LITTLE
SUGGESTED CMS G CODE MODIFIER DAILY ACTIVITY: CJ
MOBILITY SCORE: 18
CLIMB 3 TO 5 STEPS WITH RAILING: A LITTLE
DAILY ACTIVITIY SCORE: 22
WALKING IN HOSPITAL ROOM: A LITTLE
SUGGESTED CMS G CODE MODIFIER MOBILITY: CK
HELP NEEDED FOR BATHING: A LITTLE
TURNING FROM BACK TO SIDE WHILE IN FLAT BAD: A LITTLE
MOVING FROM LYING ON BACK TO SITTING ON SIDE OF FLAT BED: A LITTLE
MOVING TO AND FROM BED TO CHAIR: A LITTLE

## 2021-10-29 ASSESSMENT — PAIN DESCRIPTION - PAIN TYPE
TYPE: SURGICAL PAIN

## 2021-10-29 ASSESSMENT — LIFESTYLE VARIABLES
CONSUMPTION TOTAL: NEGATIVE
EVER FELT BAD OR GUILTY ABOUT YOUR DRINKING: NO
TOTAL SCORE: 0
EVER HAD A DRINK FIRST THING IN THE MORNING TO STEADY YOUR NERVES TO GET RID OF A HANGOVER: NO
ALCOHOL_USE: NO
HAVE YOU EVER FELT YOU SHOULD CUT DOWN ON YOUR DRINKING: NO
DOES PATIENT WANT TO STOP DRINKING: NO
HOW MANY TIMES IN THE PAST YEAR HAVE YOU HAD 5 OR MORE DRINKS IN A DAY: 0
AVERAGE NUMBER OF DAYS PER WEEK YOU HAVE A DRINK CONTAINING ALCOHOL: 0
TOTAL SCORE: 0
TOTAL SCORE: 0
EVER_SMOKED: NEVER
HAVE PEOPLE ANNOYED YOU BY CRITICIZING YOUR DRINKING: NO
ON A TYPICAL DAY WHEN YOU DRINK ALCOHOL HOW MANY DRINKS DO YOU HAVE: 0

## 2021-10-29 ASSESSMENT — COPD QUESTIONNAIRES
COPD SCREENING SCORE: 0
HAVE YOU SMOKED AT LEAST 100 CIGARETTES IN YOUR ENTIRE LIFE: NO/DON'T KNOW
DURING THE PAST 4 WEEKS HOW MUCH DID YOU FEEL SHORT OF BREATH: NONE/LITTLE OF THE TIME
DO YOU EVER COUGH UP ANY MUCUS OR PHLEGM?: NO/ONLY WITH OCCASIONAL COLDS OR INFECTIONS

## 2021-10-29 ASSESSMENT — PAIN SCALES - GENERAL: PAIN_LEVEL: 2

## 2021-10-29 ASSESSMENT — PATIENT HEALTH QUESTIONNAIRE - PHQ9
SUM OF ALL RESPONSES TO PHQ9 QUESTIONS 1 AND 2: 0
1. LITTLE INTEREST OR PLEASURE IN DOING THINGS: NOT AT ALL
2. FEELING DOWN, DEPRESSED, IRRITABLE, OR HOPELESS: NOT AT ALL

## 2021-10-29 ASSESSMENT — FIBROSIS 4 INDEX: FIB4 SCORE: 0.25

## 2021-10-29 NOTE — PROGRESS NOTES
Med rec partially completed per pt   Allergies reviewed  No PO antibiotics in the last 30 days    Will call pt's home pharmacy ([facility]) when the open to verify doses of medications

## 2021-10-29 NOTE — ANESTHESIA PREPROCEDURE EVALUATION
Relevant Problems   CARDIAC   (positive) Essential hypertension      Other   (positive) Binge eating   (positive) Bipolar disorder, current episode mixed, moderate (HCC)   (positive) Central sleep apnea   (positive) Hypercholesterolemia   (positive) Hypertriglyceridemia   (positive) Metabolic syndrome   (positive) Morbid obesity (HCC)   (positive) Weight gain   - BMI = 72    Physical Exam    Airway   Mallampati: IV  TM distance: >3 FB  Neck ROM: full       Cardiovascular - normal exam  Rhythm: regular  Rate: normal  (-) murmur     Dental - normal exam           Pulmonary - normal exam  Breath sounds clear to auscultation     Abdominal   (+) obese     Neurological - normal exam                 Anesthesia Plan    ASA 3   ASA physical status 3 criteria: morbid obesity - BMI greater than or equal to 40    Plan - general       Airway plan will be ETT          Induction: intravenous    Postoperative Plan: Postoperative administration of opioids is intended.    Pertinent diagnostic labs and testing reviewed    Informed Consent:    Anesthetic plan and risks discussed with patient.    Use of blood products discussed with: patient whom consented to blood products.

## 2021-10-29 NOTE — ANESTHESIA POSTPROCEDURE EVALUATION
Patient: Adama Burt III    Procedure Summary     Date: 10/29/21 Room / Location: [place] [address] / SURGERY [facility]    Anesthesia Start: 0840 Anesthesia Stop: 1007    Procedure: GASTRECTOMY, SLEEVE, LAPAROSCOPIC (N/A Abdomen) Diagnosis: (MORBID OBESITY)    Surgeons: Joni Quiles M.D. Responsible Provider: Prabhjot Wills M.D.    Anesthesia Type: general ASA Status: 3          Final Anesthesia Type: general  Last vitals  BP   Blood Pressure: 132/64    Temp   36.3 °C (97.4 °F)    Pulse   (!) 50   Resp   18    SpO2   97 %      Anesthesia Post Evaluation    Patient location during evaluation: PACU  Patient participation: complete - patient participated  Level of consciousness: sleepy but conscious  Pain score: 2    Airway patency: patent  Anesthetic complications: no  Cardiovascular status: hemodynamically stable  Respiratory status: acceptable  Hydration status: balanced    PONV: none          No complications documented.     Nurse Pain Score: 2 (NPRS)

## 2021-10-29 NOTE — OR NURSING
"1005 Pt arrived from OR via gurney. Report given by anesthesia and RN. Sleeping, 10L mask even non labored breathing, lungs clear, airway patent. Blood drainage to left nare. SR SB. X5 lap sites to abd, cdi, scant serosanguinous drainage. abd soft non distended, cold pack. PIV patent.     1012, 1013, 1017, 1018 ,1023 easily arousable. pt c/o pain and nausea. Treated  Per. Given cool wash cloth for comfort. Oral suctioning, sanguinous sputum.  Airway patent.     1041 updated Gaby, mother. O2 tank at 75% for transfer    1045 awake, encouraged deep breathing. Pain tolerable. Nausea resolved.     1100 resting with eyes closed, even non labored breathing, x5 lap sites, cdi, no new drainage. Abd soft non distended. Pain tolerable. Pt states \"im hot\" given cold pack for comfort.     1108 c/o pain, treated per mar    1115 resting comfortably. Even non labored breathing. Waiting for room placement.     1140 updated Gaby, mother    1148 hand off to xavier todd    1206 resumed care of pt. Awake, even non labored breathing. abd soft non distended. x5 lap sites cdi. C/o pain, treated per mar.     1215 talking. Pain tolerable. Tolerated sips of water    1230 no changes    1300 updated mother Griffin. C/o pain, treated     1310 report given to MARLENE Rosado T416, 2.      1330 transferred to bariatric bed    1350 updated mother Rinaldi. Pt ready for transfer to room. VSS.             "

## 2021-10-29 NOTE — ANESTHESIA PROCEDURE NOTES
Airway    Date/Time: 10/29/2021 8:50 AM  Performed by: Prabhjot Wills M.D.  Authorized by: Prabhjot Wills M.D.     Location:  OR  Urgency:  Elective  Indications for Airway Management:  Anesthesia      Spontaneous Ventilation: absent    Sedation Level:  Deep  Preoxygenated: Yes    Patient Position:  Ramp  Mask Difficulty Assessment:  0 - not attempted  Final Airway Type:  Endotracheal airway  Final Endotracheal Airway:  ETT  Cuffed: Yes    Technique Used for Successful ETT Placement:  Video laryngoscopy    Insertion Site:  Oral  Blade Type:  Glide  Laryngoscope Blade/Videolaryngoscope Blade Size:  4  ETT Size (mm):  7.5  Measured from:  Teeth  ETT to Teeth (cm):  21  Placement Verified by: auscultation and capnometry    Cormack-Lehane Classification:  Grade I - full view of glottis  Number of Attempts at Approach:  1

## 2021-10-29 NOTE — OP REPORT
NAME:  Adama Burt III  MRN:  [id number]  :  1995      DATE OF OPERATION: 10/29/2021    PREOPERATIVE DIAGNOSIS: Morbid Obesity with medical sequelae    POSTOPERATIVE DIAGNOSIS: Morbid Obesity with medical sequelae    OPERATION PERFORMED: 1.  Laparoscopic Sleeve Gastrectomy    SURGEON: Joni Quiles MD    ASSISTANT:  Bette Camacho PA-C, PA-C    ANESTHESIOLOGIST:  Anesthesiologist: Prabhjot Wills M.D.    ANESTHESIA: General endotracheal anesthesia.     SPECIMEN: Stomach    ESTIMATED BLOOD LOSS: <10cc.     INDICATIONS: The patient is a 26 y.o. male with a diagnosis of morbid obesity with medical sequelae. He is taken to the operating room today for Laparoscopic Sleeve Gastrectomy.     PROCEDURE: Following informed consent, the patient was properly identified, taken to the operating room, and placed in the supine position where general endotracheal anesthesia was administered. Intravenous antibiotics were administered by the anesthesiologist in the correct time interval. Sequential compression devices were employed. The abdomen was prepped and draped into a sterile field.     An optical entry bladeless  trocar was utilized and pneumoperitoneum carefully established in the usual fashion.  The bladeless 5 mm separator trocar was introduced and the 5 mm lens/camera was passed into the peritoneal cavity.  Three additional separator trocars were placed under direct vision.  A 5 mm Leela-type liver retractor was placed into position.  This was used to elevate the left sided segment of the liver.  It was secured to the patients right side with a robot arm.  Careful inspection revealed no untoward events with placement of the trocars.    The gastrocolic omentum was examined and dissected with the ligasure device and a point on the distal antrum was selected to begin the sleeve gastrectomy.  A 40 Occitan bougie was then passed down into the antrum.  A careful inspection at the hiatus demonstrated no  significant hiatal hernia which would require repair or risk significant reflux. A echelon linear stapler with a thick-tissue cartridges, was employed to divide partway across the stomach.  With the bougie in position, the stapler was then used to march proximally along the stomach and transsection of the stomach was performed, beginning 5 cm proximal to the pylorus in the method of the sleeve gastrectomy.  The greater curvature aspect of the stomach was then dissected and the greater curvature vessels and short gastric vessels were divided with the ligasure.  An additional 5mm trocar was placed to help with exposure, due to large body habitus and anatomy.    The last endomechanical stapler firings were used to complete the transection of the stomach.  Hemoclips were used if any site exhibited oozing. Careful inspection of the staple line demonstrated excellent, meticulous hemostasis and a completely intact staple line with seemless tissue approximation.  Seromuscular sutures were placed using polysorb suture to further secure the staple line.  The liver exhibited mild hepatic steatosis.  The bougie was then removed.     The large endocatch bag was then used to retrieve the stomach specimen.  Tisseal fibrin glue sealant was sprayed along the entire staple line. The ports were removed under direct vision and the pneumoperitoneum was allowed to escape. The fascia of this port was closed with 0-Vicryl suture.  The port sites were then irrigated well.  The port site skin incisions were closed with interrupted 4-0 Vicryl subcuticular sutures.  Steri-Strips and Benzoin were applied beneath sterile Band-Aids.     The patient tolerated the procedure well and there were no apparent complications. All sponge, needle, and instrument counts were correct on 2 separate occasions. He was awakened, extubated, and transferred to the recovery room in satisfactory condition.       ____________________________________   Joni Quiles  MD  DD: 10/29/2021  10:23 AM    CC:  [facility];

## 2021-10-29 NOTE — ANESTHESIA TIME REPORT
Anesthesia Start and Stop Event Times     Date Time Event    10/29/2021 0810 Ready for Procedure     0840 Anesthesia Start     1007 Anesthesia Stop        Responsible Staff  10/29/21    Name Role Begin End    Prabhjot Wills M.D. Anesth 0840 1007        Preop Diagnosis (Free Text):  Pre-op Diagnosis     MORBID OBESITY        Preop Diagnosis (Codes):    Premium Reason  Non-Premium    Comments:

## 2021-10-30 VITALS
BODY MASS INDEX: 47.74 KG/M2 | HEIGHT: 68 IN | DIASTOLIC BLOOD PRESSURE: 68 MMHG | OXYGEN SATURATION: 97 % | WEIGHT: 315 LBS | SYSTOLIC BLOOD PRESSURE: 134 MMHG | TEMPERATURE: 97 F | RESPIRATION RATE: 18 BRPM | HEART RATE: 79 BPM

## 2021-10-30 LAB
ALBUMIN SERPL BCP-MCNC: 4.2 G/DL (ref 3.2–4.9)
ALBUMIN/GLOB SERPL: 1.3 G/DL
ALP SERPL-CCNC: 110 U/L (ref 30–99)
ALT SERPL-CCNC: 33 U/L (ref 2–50)
ANION GAP SERPL CALC-SCNC: 13 MMOL/L (ref 7–16)
AST SERPL-CCNC: 20 U/L (ref 12–45)
BILIRUB SERPL-MCNC: 0.6 MG/DL (ref 0.1–1.5)
BUN SERPL-MCNC: 16 MG/DL (ref 8–22)
CALCIUM SERPL-MCNC: 9.7 MG/DL (ref 8.5–10.5)
CHLORIDE SERPL-SCNC: 100 MMOL/L (ref 96–112)
CO2 SERPL-SCNC: 24 MMOL/L (ref 20–33)
CREAT SERPL-MCNC: 0.88 MG/DL (ref 0.5–1.4)
ERYTHROCYTE [DISTWIDTH] IN BLOOD BY AUTOMATED COUNT: 43.3 FL (ref 35.9–50)
GLOBULIN SER CALC-MCNC: 3.3 G/DL (ref 1.9–3.5)
GLUCOSE SERPL-MCNC: 100 MG/DL (ref 65–99)
HCT VFR BLD AUTO: 48.6 % (ref 42–52)
HGB BLD-MCNC: 15.8 G/DL (ref 14–18)
MCH RBC QN AUTO: 28.3 PG (ref 27–33)
MCHC RBC AUTO-ENTMCNC: 32.5 G/DL (ref 33.7–35.3)
MCV RBC AUTO: 87.1 FL (ref 81.4–97.8)
PLATELET # BLD AUTO: 283 K/UL (ref 164–446)
PMV BLD AUTO: 11 FL (ref 9–12.9)
POTASSIUM SERPL-SCNC: 3.8 MMOL/L (ref 3.6–5.5)
PROT SERPL-MCNC: 7.5 G/DL (ref 6–8.2)
RBC # BLD AUTO: 5.58 M/UL (ref 4.7–6.1)
SODIUM SERPL-SCNC: 137 MMOL/L (ref 135–145)
WBC # BLD AUTO: 14.6 K/UL (ref 4.8–10.8)

## 2021-10-30 PROCEDURE — 36415 COLL VENOUS BLD VENIPUNCTURE: CPT

## 2021-10-30 PROCEDURE — 700105 HCHG RX REV CODE 258: Performed by: STUDENT IN AN ORGANIZED HEALTH CARE EDUCATION/TRAINING PROGRAM

## 2021-10-30 PROCEDURE — 80053 COMPREHEN METABOLIC PANEL: CPT

## 2021-10-30 PROCEDURE — 90686 IIV4 VACC NO PRSV 0.5 ML IM: CPT | Performed by: COLON & RECTAL SURGERY

## 2021-10-30 PROCEDURE — 85027 COMPLETE CBC AUTOMATED: CPT

## 2021-10-30 PROCEDURE — 700111 HCHG RX REV CODE 636 W/ 250 OVERRIDE (IP): Performed by: COLON & RECTAL SURGERY

## 2021-10-30 PROCEDURE — 700111 HCHG RX REV CODE 636 W/ 250 OVERRIDE (IP): Performed by: STUDENT IN AN ORGANIZED HEALTH CARE EDUCATION/TRAINING PROGRAM

## 2021-10-30 PROCEDURE — 700102 HCHG RX REV CODE 250 W/ 637 OVERRIDE(OP): Performed by: STUDENT IN AN ORGANIZED HEALTH CARE EDUCATION/TRAINING PROGRAM

## 2021-10-30 PROCEDURE — 94760 N-INVAS EAR/PLS OXIMETRY 1: CPT

## 2021-10-30 PROCEDURE — 3E02340 INTRODUCTION OF INFLUENZA VACCINE INTO MUSCLE, PERCUTANEOUS APPROACH: ICD-10-PCS | Performed by: COLON & RECTAL SURGERY

## 2021-10-30 PROCEDURE — A9270 NON-COVERED ITEM OR SERVICE: HCPCS | Performed by: STUDENT IN AN ORGANIZED HEALTH CARE EDUCATION/TRAINING PROGRAM

## 2021-10-30 RX ADMIN — CARBAMAZEPINE 200 MG: 200 TABLET ORAL at 04:50

## 2021-10-30 RX ADMIN — ACETAMINOPHEN 1000 MG: 500 TABLET ORAL at 11:44

## 2021-10-30 RX ADMIN — ACETAMINOPHEN 1000 MG: 500 TABLET ORAL at 04:51

## 2021-10-30 RX ADMIN — ENOXAPARIN SODIUM 40 MG: 40 INJECTION SUBCUTANEOUS at 09:14

## 2021-10-30 RX ADMIN — HYDROCHLOROTHIAZIDE 25 MG: 25 TABLET ORAL at 04:53

## 2021-10-30 RX ADMIN — OXYCODONE HYDROCHLORIDE 5 MG: 5 SOLUTION ORAL at 15:28

## 2021-10-30 RX ADMIN — LOSARTAN POTASSIUM 100 MG: 50 TABLET, FILM COATED ORAL at 04:50

## 2021-10-30 RX ADMIN — FAMOTIDINE 20 MG: 10 INJECTION INTRAVENOUS at 04:49

## 2021-10-30 RX ADMIN — POTASSIUM CHLORIDE: 2 INJECTION, SOLUTION, CONCENTRATE INTRAVENOUS at 06:06

## 2021-10-30 RX ADMIN — GABAPENTIN 300 MG: 300 CAPSULE ORAL at 11:43

## 2021-10-30 RX ADMIN — GABAPENTIN 300 MG: 300 CAPSULE ORAL at 04:50

## 2021-10-30 RX ADMIN — INFLUENZA A VIRUS A/VICTORIA/2570/2019 IVR-215 (H1N1) ANTIGEN (FORMALDEHYDE INACTIVATED), INFLUENZA A VIRUS A/TASMANIA/503/2020 IVR-221 (H3N2) ANTIGEN (FORMALDEHYDE INACTIVATED), INFLUENZA B VIRUS B/PHUKET/3073/2013 ANTIGEN (FORMALDEHYDE INACTIVATED), AND INFLUENZA B VIRUS B/WASHINGTON/02/2019 ANTIGEN (FORMALDEHYDE INACTIVATED) 0.5 ML: 15; 15; 15; 15 INJECTION, SUSPENSION INTRAMUSCULAR at 05:34

## 2021-10-30 RX ADMIN — OXYCODONE HYDROCHLORIDE 5 MG: 5 SOLUTION ORAL at 09:14

## 2021-10-30 RX ADMIN — OXYCODONE HYDROCHLORIDE 5 MG: 5 SOLUTION ORAL at 02:18

## 2021-10-30 RX ADMIN — ONDANSETRON 4 MG: 2 INJECTION INTRAMUSCULAR; INTRAVENOUS at 04:57

## 2021-10-30 ASSESSMENT — ENCOUNTER SYMPTOMS
DIZZINESS: 0
ABDOMINAL PAIN: 1
NAUSEA: 1
FEVER: 0
VOMITING: 0
CHILLS: 0
COUGH: 0

## 2021-10-30 ASSESSMENT — PAIN DESCRIPTION - PAIN TYPE
TYPE: ACUTE PAIN
TYPE: SURGICAL PAIN
TYPE: SURGICAL PAIN

## 2021-10-30 NOTE — PROGRESS NOTES
4 Eyes Skin Assessment Completed by Michelle RN and Mariam RN.    Head WDL  Ears WDL  Nose WDL  Mouth WDL  Neck WDL  Breast/Chest WDL  Shoulder Blades WDL  Spine WDL  (R) Arm/Elbow/Hand WDL  (L) Arm/Elbow/Hand WDL  Abdomen Incision, x5 lap sites, dermabond and bandaids  Groin WDL  Scrotum/Coccyx/Buttocks WDL  (R) Leg WDL  (L) Leg WDL  (R) Heel/Foot/Toe WDL  (L) Heel/Foot/Toe WDL          Devices In Places Pulse Ox      Interventions In Place Low Air Loss Mattress    Possible Skin Injury No    Pictures Uploaded Into Epic N/A  Wound Consult Placed N/A  RN Wound Prevention Protocol Ordered No

## 2021-10-30 NOTE — CARE PLAN
The patient is Watcher - Medium risk of patient condition declining or worsening    Shift Goals  Clinical Goals: Pain control  Patient Goals: Rest    Progress made toward(s) clinical / shift goals:  Pt a 4/10 pain, pt resting in bed    Patient is not progressing towards the following goals:          Problem: Pain - Standard  Goal: Alleviation of pain or a reduction in pain to the patient’s comfort goal  Outcome: Progressing     Problem: Knowledge Deficit - Standard  Goal: Patient and family/care givers will demonstrate understanding of plan of care, disease process/condition, diagnostic tests and medications  Outcome: Progressing

## 2021-10-30 NOTE — PROGRESS NOTES
Surgical Progress Note    Author: Patricia Massey P.A.-C. Date & Time created: 10/30/2021   10:15 AM     Interval Events:  Patient is a pleasant 27 y/o M POD 1 s/p 1.  Laparoscopic Sleeve Gastrectomy.    Patient was seen and examined. Tolerating clears without nausea/vomiting. Admits to typical incisional abdominal pain, controlled with medication. Pt is ambulating and voiding. Labs reviewed, VSS. ON 5 L NC, unsure baseline but patient does report having oxygen at home.  Review of Systems   Constitutional: Negative for chills and fever.   Respiratory: Negative for cough.    Cardiovascular: Negative for chest pain.   Gastrointestinal: Positive for abdominal pain and nausea. Negative for vomiting.   Skin: Negative for rash.   Neurological: Negative for dizziness.     Hemodynamics:  Temp (24hrs), Av.4 °C (97.5 °F), Min:35.9 °C (96.7 °F), Max:37 °C (98.6 °F)  Temperature: 37 °C (98.6 °F)  Pulse  Av.1  Min: 49  Max: 78   Blood Pressure: 143/85     Respiratory:    Respiration: 18, Pulse Oximetry: 95 %     Work Of Breathing / Effort: Mild  RUL Breath Sounds: Diminished, RML Breath Sounds: Diminished, RLL Breath Sounds: Diminished, BONIFACIO Breath Sounds: Diminished, LLL Breath Sounds: Diminished  Neuro:  GCS       Fluids:    Intake/Output Summary (Last 24 hours) at 10/30/2021 1015  Last data filed at 10/29/2021 2046  Gross per 24 hour   Intake 360 ml   Output --   Net 360 ml       Current Diet Order   Procedures   • Diet Order Diet: Clear Liquid; Miscellaneous modifications: (optional): Bariatric     Physical Exam  Constitutional:       Appearance: Normal appearance. He is obese.   HENT:      Head: Normocephalic and atraumatic.      Mouth/Throat:      Mouth: Mucous membranes are moist.   Eyes:      Extraocular Movements: Extraocular movements intact.   Cardiovascular:      Rate and Rhythm: Normal rate and regular rhythm.   Pulmonary:      Effort: Pulmonary effort is normal. No respiratory distress.   Abdominal:       General: There is no distension.      Palpations: Abdomen is soft.      Tenderness: There is abdominal tenderness. There is no rebound.   Musculoskeletal:      Cervical back: Normal range of motion.   Skin:     General: Skin is warm and dry.   Neurological:      General: No focal deficit present.      Mental Status: He is alert.       Labs:  Recent Results (from the past 24 hour(s))   Histology Request    Collection Time: 10/29/21  1:11 PM   Result Value Ref Range    Pathology Request Sent to Histo    CBC without Differential (blood)    Collection Time: 10/30/21  4:58 AM   Result Value Ref Range    WBC 14.6 (H) 4.8 - 10.8 K/uL    RBC 5.58 4.70 - 6.10 M/uL    Hemoglobin 15.8 14.0 - 18.0 g/dL    Hematocrit 48.6 42.0 - 52.0 %    MCV 87.1 81.4 - 97.8 fL    MCH 28.3 27.0 - 33.0 pg    MCHC 32.5 (L) 33.7 - 35.3 g/dL    RDW 43.3 35.9 - 50.0 fL    Platelet Count 283 164 - 446 K/uL    MPV 11.0 9.0 - 12.9 fL   Comp Metabolic Panel (CMP)    Collection Time: 10/30/21  4:58 AM   Result Value Ref Range    Sodium 137 135 - 145 mmol/L    Potassium 3.8 3.6 - 5.5 mmol/L    Chloride 100 96 - 112 mmol/L    Co2 24 20 - 33 mmol/L    Anion Gap 13.0 7.0 - 16.0    Glucose 100 (H) 65 - 99 mg/dL    Bun 16 8 - 22 mg/dL    Creatinine 0.88 0.50 - 1.40 mg/dL    Calcium 9.7 8.5 - 10.5 mg/dL    AST(SGOT) 20 12 - 45 U/L    ALT(SGPT) 33 2 - 50 U/L    Alkaline Phosphatase 110 (H) 30 - 99 U/L    Total Bilirubin 0.6 0.1 - 1.5 mg/dL    Albumin 4.2 3.2 - 4.9 g/dL    Total Protein 7.5 6.0 - 8.2 g/dL    Globulin 3.3 1.9 - 3.5 g/dL    A-G Ratio 1.3 g/dL   ESTIMATED GFR    Collection Time: 10/30/21  4:58 AM   Result Value Ref Range    GFR If African American >60 >60 mL/min/1.73 m 2    GFR If Non African American >60 >60 mL/min/1.73 m 2     Medical Decision Making, by Problem:  There are no active hospital problems to display for this patient.    Plan:  Pt is alert and oriented, NAD. Breathing unlabored. Tolerating PO.  Incisions ok. VS stable. Labs reviewed.   Leukocytosis, likely reactive. Encouraged ambulation and incentive spirometry.  Wean O2. Pt may need to stay another night for nausea, pain control, hypoxia, will see how the day progresses. Pt also seen and examined by Dr. Quiles.    Quality Measures:  Quality-Core Measures   Reviewed items::  Labs reviewed  Augustin catheter::  No Augustin  DVT prophylaxis pharmacological::  Enoxaparin (Lovenox)  DVT prophylaxis - mechanical:  SCDs  Ulcer Prophylaxis::  Yes      Discussed patient condition with Patient and Dr. Quiles

## 2021-10-30 NOTE — PROGRESS NOTES
Pt is A&O x4, calls appropriately  Pain 4/10   - nausea  Tolerating a gastric clears diet   x5 lap sites, CDI bandaids  + Voids  - flatus  Last BM pta  Up SBA  Bed alarm off, pt no fall risk per [place]  Reviewed plan of care with patient, bed in lowest position and locked, pt resting comfortably now, call light within reach, all needs met at this time. Interventions will be executed per plan of care

## 2021-10-30 NOTE — DISCHARGE INSTRUCTIONS
Discharge Instructions    Discharged to home by car with relative. Discharged via wheelchair, hospital escort: Yes.  Special equipment needed: Not Applicable    Be sure to schedule a follow-up appointment with your primary care doctor or any specialists as instructed.     Discharge Plan:   Diet Plan: Discussed  Activity Level: Discussed  Confirmed Follow up Appointment: Patient to Call and Schedule Appointment  Confirmed Symptoms Management: Discussed  Medication Reconciliation Updated: Yes  Influenza Vaccine Indication: Indicated: 9 to 64 years of age  Influenza Vaccine Given - only chart on this line when given: Influenza Vaccine Given (See MAR)    I understand that a diet low in cholesterol, fat, and sodium is recommended for good health. Unless I have been given specific instructions below for another diet, I accept this instruction as my diet prescription.   Other diet: Follow booklet    Special Instructions: OK to shower starting 10/31.  Do not take baths/submerge incisions.  No heavy lifting greater than 10 pounds until cleared by surgeon.  Take scopolamine patch off 10/31 8am.  Wash hands thoroughly after removal.      · Is patient discharged on Warfarin / Coumadin?   No       Sleeve Gastrectomy, Care After  This sheet gives you information about how to care for yourself after your procedure. Your health care provider may also give you more specific instructions. If you have problems or questions, contact your health care provider.  What can I expect after the procedure?  After the procedure, it is common to have:  · Pain in the abdomen.  · Decreased appetite.  · Clear fluid leaking through the small tube (drain) that comes from your incision site.  Follow these instructions at home:  Medicines  · Take over-the-counter and prescription medicines only as told by your health care provider.  · Do not drive for 24 hours if you were given a sedative during your procedure.  · Do not drive or use heavy machinery  while taking prescription pain medicine.  Incision and drain care    · Follow instructions from your health care provider about how to take care of your incisions. Make sure you:  ? Wash your hands with soap and water before and after you change your bandage (dressing). If soap and water are not available, use hand .  ? Change your dressing as told by your health care provider.  ? Leave stitches (sutures), skin glue, or adhesive strips in place. These skin closures may need to be in place for 2 weeks or longer. If adhesive strip edges start to loosen and curl up, you may trim the loose edges. Do not remove adhesive strips completely unless your health care provider tells you to do that.  · Keep the area around your incisions and your drain clean and dry.  · Check your incision areas every day for signs of infection. Check for:  ? Redness, swelling, or pain.  ? Fluid or blood.  ? Warmth.  ? Pus or a bad smell.  · Empty your drain every day. Follow instructions from your health care provider about recording the amount of fluid that comes from your drain. Make note of any changes in the amount or appearance of the fluid.  Activity    · Rest as told by your health care provider.  · Avoid sitting for a long time without moving. Get up to take short walks every 1-2 hours. This is important to improve blood flow and breathing. Ask for help if you feel weak or unsteady.  · Return to your normal activities as told by your health care provider. Ask your health care provider what activities are safe for you.  · Do not lift anything that is heavier than 10 lb (4.5 kg), or the limit that you are told, until your health care provider says that it is safe.  · Avoid intense physical activity for as long as told by your health care provider.  Eating and drinking  · Follow instructions from your health care provider about eating or drinking restrictions. You will be given instructions about the type, the size, and the timing  of your meals.  ? Keep track of any foods that cause discomfort, such as bloating or cramping.  ? Eat healthy foods. Avoid foods that are high in fat or sugar.  · Stop eating when you feel full.  · Take supplements only as told by your health care provider.  · Drink enough fluid to keep your urine pale yellow.  General instructions  · Do not take baths, swim, or use a hot tub until your health care provider approves. Ask your health care provider if you may take showers. You may only be allowed to take sponge baths.  · Do not use any products that contain nicotine or tobacco, such as cigarettes, e-cigarettes, and chewing tobacco. If you need help quitting, ask your health care provider.  · Wear compression stockings as told by your health care provider. These stockings help to prevent blood clots and reduce swelling in your legs.  · Do breathing exercises as told by your health care provider.  · Keep all follow-up visits as told by your health care provider. This is important.  Contact a health care provider if you have:  · Pain that gets worse or does not get better with medicine.  · Redness, swelling, or pain around your incisions.  · Fluid or blood coming from your incisions.  · Incisions that feel warm to the touch.  · Pus or a bad smell coming from your incisions.  · A fever or chills.  · Problems with your drain.  · Green or bad-smelling fluid leaking from your drain.  Get help right away if you have:  · Trouble breathing.  · Severe pain, especially in your legs.  Summary  · After the procedure, it is common to have pain in the abdomen, decreased appetite, and clear fluid leaking from the drain in an incision site.  · Take over-the-counter and prescription medicines only as told by your health care provider.  · Follow instructions from your health care provider about how to take care of your incisions. Report any signs of infection to your health care provider.  · After surgery, get up to take short walks  every 1-2 hours. This is important to improve blood flow and breathing. Follow instructions from your health care provider about eating or drinking restrictions.  · Get help right away if you have trouble breathing or severe pain, especially in your legs.  This information is not intended to replace advice given to you by your health care provider. Make sure you discuss any questions you have with your health care provider.  Document Released: 10/14/2010 Document Revised: 04/09/2020 Document Reviewed: 08/06/2019  Elsevier Patient Education © 2020 [employer]        How and Where to Give Subcutaneous Enoxaparin Injections  Enoxaparin is an injectable medicine. It is used to help prevent blood clots from developing in your veins. Health care providers often use anticoagulants like enoxaparin to prevent clots following surgery. Enoxaparin is also used in combination with other medicines to treat blood clots and heart attacks. If blood clots are left untreated, they can be life threatening.  Enoxaparin comes in single-use syringes. You inject enoxaparin through a syringe into your belly (abdomen). You should change the injection site each time you give yourself a shot. Continue the enoxaparin injections as directed by your health care provider. Your health care provider will use blood clotting test results to decide when you can safely stop using enoxaparin injections. If your health care provider prescribes any additional medicines, use the medicines exactly as directed.  How do I inject enoxaparin?  1. Wash your hands with soap and water.  2. Clean the selected injection site as directed by your health care provider.  3. Remove the needle cap by pulling it straight off the syringe.  4. When using a prefilled syringe, do not push the air bubble out of the syringe before the injection. The air bubble will help you get all of the medicine out of the syringe.  5. Hold the syringe like a pencil using your writing  hand.  6. Use your other hand to pinch and hold an inch of the cleansed skin.  7. Insert the entire needle straight down into the fold of skin.  8. Push the plunger with your thumb until the syringe is empty.  9. Pull the needle straight out of your skin.  10. Enoxaparin injection prefilled syringes and graduated prefilled syringes are available with a system that shields the needle after injection. After you have completed your injection and removed the needle from your skin, firmly push down on the plunger. The protective sleeve will automatically cover the needle and you will hear a click. The click means the needle is safely covered.  11. Place the syringe in the nearest needle box, also called a sharps container. If you do not have a sharps container, you can use a hard-sided plastic container with a secure lid, such as an empty laundry detergent bottle.  What else do I need to know?  · Do not use enoxaparin if:  ¨ You have allergies to heparin or pork products.  ¨ You have been diagnosed with a condition called thrombocytopenia.  · Do not use the syringe or needle more than one time.  · Use medicines only as directed by your health care provider.  · Changes in medicines, supplements, diet, and illness can affect your anticoagulation therapy. Be sure to inform your health care provider of any of these changes.  · It is important that you tell all of your health care providers and your dentist that you are taking an anticoagulant, especially if you are injured or plan to have any type of procedure.  · While on anticoagulants, you will need to have blood tests done routinely as directed by your health care provider.  · While using this medicine, avoid physical activities or sports that could result in a fall or cause injury.  · Follow up with your laboratory test and health care provider appointments as directed. It is very important to keep your appointments. Not keeping appointments could result in a chronic or  permanent injury, pain, or disability.  · Before giving your medicine, you should make sure the injection is a clear and colorless or pale yellow solution. If your medicine becomes discolored or if there are particles in the syringe, do not use it and notify your health care provider.  · Keep your medicine safely stored at room temperature.  Contact a health care provider if:  · You develop any rashes on your skin.  · You have large areas of bruising on your skin.  · You have any worsening of the condition for which you take Enoxaparin.  · You develop a fever.  Get help right away if:  · You develop bleeding problems such as:  ¨ Bleeding from the gums or nose that does not stop quickly.  ¨ Vomiting blood or coughing up blood.  ¨ Blood in your urine.  ¨ Blood in your stool, or stool that has a dark, tarry, or coffee grounds appearance.  ¨ A cut that does not stop bleeding within 10 minutes.  These symptoms may represent a serious problem that is an emergency. Do not wait to see if the symptoms will go away. Get medical help right away. Call your local emergency services (911 in the [place] Do not drive yourself to the hospital.   This information is not intended to replace advice given to you by your health care provider. Make sure you discuss any questions you have with your health care provider.  Document Released: 10/19/2005 Document Revised: 08/24/2017 Document Reviewed: 06/04/2015  Elsevier Interactive Patient Education © 2017 [employer] Inc[employer]      Depression / Suicide Risk    As you are discharged from this [facility] facility, it is important to learn how to keep safe from harming yourself.    Recognize the warning signs:  · Abrupt changes in personality, positive or negative- including increase in energy   · Giving away possessions  · Change in eating patterns- significant weight changes-  positive or negative  · Change in sleeping patterns- unable to sleep or sleeping all the time   · Unwillingness or  inability to communicate  · Depression  · Unusual sadness, discouragement and loneliness  · Talk of wanting to die  · Neglect of personal appearance   · Rebelliousness- reckless behavior  · Withdrawal from people/activities they love  · Confusion- inability to concentrate     If you or a loved one observes any of these behaviors or has concerns about self-harm, here's what you can do:  · Talk about it- your feelings and reasons for harming yourself  · Remove any means that you might use to hurt yourself (examples: pills, rope, extension cords, firearm)  · Get professional help from the community (Mental Health, Substance Abuse, psychological counseling)  · Do not be alone:Call your Safe Contact- someone whom you trust who will be there for you.  · Call your local CRISIS HOTLINE 216-4780 or 224-920-0917  · Call your local Children's Mobile Crisis Response Team [place] (333) 537-5680 or www.[employer]  · Call the toll free National Suicide Prevention Hotlines   · National Suicide Prevention Lifeline 726-154-PQPD (9696)  · National Hope Line Network 800-SUICIDE (962-7192)

## 2022-03-07 ENCOUNTER — HOSPITAL ENCOUNTER (OUTPATIENT)
Dept: LAB | Facility: MEDICAL CENTER | Age: 27
End: 2022-03-07
Attending: PHYSICIAN ASSISTANT
Payer: MEDICAID

## 2022-03-07 LAB
25(OH)D3 SERPL-MCNC: 30 NG/ML (ref 30–100)
ALBUMIN SERPL BCP-MCNC: 4.5 G/DL (ref 3.2–4.9)
ALBUMIN/GLOB SERPL: 1.5 G/DL
ALP SERPL-CCNC: 131 U/L (ref 30–99)
ALT SERPL-CCNC: 26 U/L (ref 2–50)
ANION GAP SERPL CALC-SCNC: 13 MMOL/L (ref 7–16)
AST SERPL-CCNC: 27 U/L (ref 12–45)
BASOPHILS # BLD AUTO: 1.1 % (ref 0–1.8)
BASOPHILS # BLD: 0.09 K/UL (ref 0–0.12)
BILIRUB SERPL-MCNC: 0.5 MG/DL (ref 0.1–1.5)
BUN SERPL-MCNC: 16 MG/DL (ref 8–22)
CALCIUM SERPL-MCNC: 9.8 MG/DL (ref 8.5–10.5)
CHLORIDE SERPL-SCNC: 103 MMOL/L (ref 96–112)
CHOLEST SERPL-MCNC: 189 MG/DL (ref 100–199)
CO2 SERPL-SCNC: 26 MMOL/L (ref 20–33)
CREAT SERPL-MCNC: 0.76 MG/DL (ref 0.5–1.4)
EOSINOPHIL # BLD AUTO: 0.1 K/UL (ref 0–0.51)
EOSINOPHIL NFR BLD: 1.2 % (ref 0–6.9)
ERYTHROCYTE [DISTWIDTH] IN BLOOD BY AUTOMATED COUNT: 43.5 FL (ref 35.9–50)
FASTING STATUS PATIENT QL REPORTED: NORMAL
FERRITIN SERPL-MCNC: 175 NG/ML (ref 22–322)
FOLATE SERPL-MCNC: 12.9 NG/ML
GLOBULIN SER CALC-MCNC: 3 G/DL (ref 1.9–3.5)
GLUCOSE SERPL-MCNC: 83 MG/DL (ref 65–99)
HCT VFR BLD AUTO: 50.3 % (ref 42–52)
HDLC SERPL-MCNC: 64 MG/DL
HGB BLD-MCNC: 16.6 G/DL (ref 14–18)
IMM GRANULOCYTES # BLD AUTO: 0.04 K/UL (ref 0–0.11)
IMM GRANULOCYTES NFR BLD AUTO: 0.5 % (ref 0–0.9)
IRON SERPL-MCNC: 76 UG/DL (ref 50–180)
LDLC SERPL CALC-MCNC: 105 MG/DL
LYMPHOCYTES # BLD AUTO: 2.21 K/UL (ref 1–4.8)
LYMPHOCYTES NFR BLD: 26.1 % (ref 22–41)
MCH RBC QN AUTO: 29.9 PG (ref 27–33)
MCHC RBC AUTO-ENTMCNC: 33 G/DL (ref 33.7–35.3)
MCV RBC AUTO: 90.5 FL (ref 81.4–97.8)
MONOCYTES # BLD AUTO: 0.64 K/UL (ref 0–0.85)
MONOCYTES NFR BLD AUTO: 7.6 % (ref 0–13.4)
NEUTROPHILS # BLD AUTO: 5.38 K/UL (ref 1.82–7.42)
NEUTROPHILS NFR BLD: 63.5 % (ref 44–72)
NRBC # BLD AUTO: 0 K/UL
NRBC BLD-RTO: 0 /100 WBC
PLATELET # BLD AUTO: 290 K/UL (ref 164–446)
PMV BLD AUTO: 11.3 FL (ref 9–12.9)
POTASSIUM SERPL-SCNC: 3.8 MMOL/L (ref 3.6–5.5)
PREALB SERPL-MCNC: 30.6 MG/DL (ref 18–38)
PROT SERPL-MCNC: 7.5 G/DL (ref 6–8.2)
RBC # BLD AUTO: 5.56 M/UL (ref 4.7–6.1)
SODIUM SERPL-SCNC: 142 MMOL/L (ref 135–145)
TRANSFERRIN SERPL-MCNC: 292 MG/DL (ref 200–370)
TRIGL SERPL-MCNC: 102 MG/DL (ref 0–149)
VIT B12 SERPL-MCNC: 613 PG/ML (ref 211–911)
WBC # BLD AUTO: 8.5 K/UL (ref 4.8–10.8)

## 2022-03-07 PROCEDURE — 82728 ASSAY OF FERRITIN: CPT

## 2022-03-07 PROCEDURE — 80061 LIPID PANEL: CPT

## 2022-03-07 PROCEDURE — 84134 ASSAY OF PREALBUMIN: CPT

## 2022-03-07 PROCEDURE — 84425 ASSAY OF VITAMIN B-1: CPT

## 2022-03-07 PROCEDURE — 82607 VITAMIN B-12: CPT

## 2022-03-07 PROCEDURE — 84207 ASSAY OF VITAMIN B-6: CPT

## 2022-03-07 PROCEDURE — 82746 ASSAY OF FOLIC ACID SERUM: CPT

## 2022-03-07 PROCEDURE — 82652 VIT D 1 25-DIHYDROXY: CPT

## 2022-03-07 PROCEDURE — 84466 ASSAY OF TRANSFERRIN: CPT

## 2022-03-07 PROCEDURE — 82306 VITAMIN D 25 HYDROXY: CPT

## 2022-03-07 PROCEDURE — 80053 COMPREHEN METABOLIC PANEL: CPT

## 2022-03-07 PROCEDURE — 84252 ASSAY OF VITAMIN B-2: CPT

## 2022-03-07 PROCEDURE — 84630 ASSAY OF ZINC: CPT

## 2022-03-07 PROCEDURE — 85025 COMPLETE CBC W/AUTO DIFF WBC: CPT

## 2022-03-07 PROCEDURE — 36415 COLL VENOUS BLD VENIPUNCTURE: CPT

## 2022-03-07 PROCEDURE — 83540 ASSAY OF IRON: CPT

## 2022-03-09 LAB
1,25(OH)2D3 SERPL-MCNC: 41 PG/ML (ref 19.9–79.3)
ZINC SERPL-MCNC: 85.2 UG/DL (ref 60–120)

## 2022-03-11 LAB — VIT B6 SERPL-MCNC: 90 NMOL/L (ref 20–125)

## 2022-03-12 LAB
VIT B1 BLD-MCNC: 93 NMOL/L (ref 70–180)
VIT B2 SERPL-SCNC: 9 NMOL/L (ref 5–50)

## 2022-04-17 ENCOUNTER — HOSPITAL ENCOUNTER (INPATIENT)
Facility: MEDICAL CENTER | Age: 27
LOS: 4 days | DRG: 872 | End: 2022-04-21
Attending: EMERGENCY MEDICINE | Admitting: STUDENT IN AN ORGANIZED HEALTH CARE EDUCATION/TRAINING PROGRAM
Payer: MEDICAID

## 2022-04-17 ENCOUNTER — APPOINTMENT (OUTPATIENT)
Dept: RADIOLOGY | Facility: MEDICAL CENTER | Age: 27
DRG: 872 | End: 2022-04-17
Attending: EMERGENCY MEDICINE
Payer: MEDICAID

## 2022-04-17 DIAGNOSIS — A41.9 SEPSIS, DUE TO UNSPECIFIED ORGANISM, UNSPECIFIED WHETHER ACUTE ORGAN DYSFUNCTION PRESENT (HCC): ICD-10-CM

## 2022-04-17 DIAGNOSIS — L03.90 CELLULITIS, UNSPECIFIED CELLULITIS SITE: ICD-10-CM

## 2022-04-17 DIAGNOSIS — L03.116 CELLULITIS OF LEFT FOOT: ICD-10-CM

## 2022-04-17 PROBLEM — R74.01 TRANSAMINITIS: Status: ACTIVE | Noted: 2022-04-17

## 2022-04-17 PROBLEM — R80.9 PROTEINURIA: Status: ACTIVE | Noted: 2022-04-17

## 2022-04-17 LAB
ALBUMIN SERPL BCP-MCNC: 4.8 G/DL (ref 3.2–4.9)
ALBUMIN/GLOB SERPL: 1.5 G/DL
ALP SERPL-CCNC: 154 U/L (ref 30–99)
ALT SERPL-CCNC: 53 U/L (ref 2–50)
ANION GAP SERPL CALC-SCNC: 16 MMOL/L (ref 7–16)
APPEARANCE UR: CLEAR
AST SERPL-CCNC: 49 U/L (ref 12–45)
BACTERIA #/AREA URNS HPF: NEGATIVE /HPF
BASOPHILS # BLD AUTO: 0.2 % (ref 0–1.8)
BASOPHILS # BLD: 0.03 K/UL (ref 0–0.12)
BILIRUB SERPL-MCNC: 1.3 MG/DL (ref 0.1–1.5)
BILIRUB UR QL STRIP.AUTO: NEGATIVE
BUN SERPL-MCNC: 22 MG/DL (ref 8–22)
CALCIUM SERPL-MCNC: 9.9 MG/DL (ref 8.5–10.5)
CHLORIDE SERPL-SCNC: 101 MMOL/L (ref 96–112)
CO2 SERPL-SCNC: 21 MMOL/L (ref 20–33)
COLOR UR: YELLOW
CREAT SERPL-MCNC: 1.18 MG/DL (ref 0.5–1.4)
CRP SERPL HS-MCNC: 4.24 MG/DL (ref 0–0.75)
EOSINOPHIL # BLD AUTO: 0.01 K/UL (ref 0–0.51)
EOSINOPHIL NFR BLD: 0.1 % (ref 0–6.9)
EPI CELLS #/AREA URNS HPF: NEGATIVE /HPF
ERYTHROCYTE [DISTWIDTH] IN BLOOD BY AUTOMATED COUNT: 41.6 FL (ref 35.9–50)
ERYTHROCYTE [SEDIMENTATION RATE] IN BLOOD BY WESTERGREN METHOD: 3 MM/HOUR (ref 0–20)
FLUAV RNA SPEC QL NAA+PROBE: NEGATIVE
FLUBV RNA SPEC QL NAA+PROBE: NEGATIVE
GFR SERPLBLD CREATININE-BSD FMLA CKD-EPI: 86 ML/MIN/1.73 M 2
GLOBULIN SER CALC-MCNC: 3.2 G/DL (ref 1.9–3.5)
GLUCOSE SERPL-MCNC: 109 MG/DL (ref 65–99)
GLUCOSE UR STRIP.AUTO-MCNC: NEGATIVE MG/DL
HCT VFR BLD AUTO: 50.5 % (ref 42–52)
HGB BLD-MCNC: 17 G/DL (ref 14–18)
HYALINE CASTS #/AREA URNS LPF: ABNORMAL /LPF
IMM GRANULOCYTES # BLD AUTO: 0.12 K/UL (ref 0–0.11)
IMM GRANULOCYTES NFR BLD AUTO: 0.8 % (ref 0–0.9)
KETONES UR STRIP.AUTO-MCNC: 15 MG/DL
LACTATE BLD-SCNC: 2.2 MMOL/L (ref 0.5–2)
LEUKOCYTE ESTERASE UR QL STRIP.AUTO: NEGATIVE
LYMPHOCYTES # BLD AUTO: 0.5 K/UL (ref 1–4.8)
LYMPHOCYTES NFR BLD: 3.1 % (ref 22–41)
MCH RBC QN AUTO: 29.9 PG (ref 27–33)
MCHC RBC AUTO-ENTMCNC: 33.7 G/DL (ref 33.7–35.3)
MCV RBC AUTO: 88.8 FL (ref 81.4–97.8)
MICRO URNS: ABNORMAL
MONOCYTES # BLD AUTO: 0.57 K/UL (ref 0–0.85)
MONOCYTES NFR BLD AUTO: 3.6 % (ref 0–13.4)
NEUTROPHILS # BLD AUTO: 14.69 K/UL (ref 1.82–7.42)
NEUTROPHILS NFR BLD: 92.2 % (ref 44–72)
NITRITE UR QL STRIP.AUTO: NEGATIVE
NRBC # BLD AUTO: 0 K/UL
NRBC BLD-RTO: 0 /100 WBC
PH UR STRIP.AUTO: 6.5 [PH] (ref 5–8)
PLATELET # BLD AUTO: 229 K/UL (ref 164–446)
PMV BLD AUTO: 11.1 FL (ref 9–12.9)
POTASSIUM SERPL-SCNC: 3.9 MMOL/L (ref 3.6–5.5)
PROT SERPL-MCNC: 8 G/DL (ref 6–8.2)
PROT UR QL STRIP: 100 MG/DL
RBC # BLD AUTO: 5.69 M/UL (ref 4.7–6.1)
RBC # URNS HPF: ABNORMAL /HPF
RBC UR QL AUTO: NEGATIVE
RSV RNA SPEC QL NAA+PROBE: NEGATIVE
SARS-COV-2 RNA RESP QL NAA+PROBE: NOTDETECTED
SODIUM SERPL-SCNC: 138 MMOL/L (ref 135–145)
SP GR UR STRIP.AUTO: 1.02
SPECIMEN SOURCE: NORMAL
UROBILINOGEN UR STRIP.AUTO-MCNC: 1 MG/DL
WBC # BLD AUTO: 15.9 K/UL (ref 4.8–10.8)
WBC #/AREA URNS HPF: ABNORMAL /HPF

## 2022-04-17 PROCEDURE — 81001 URINALYSIS AUTO W/SCOPE: CPT

## 2022-04-17 PROCEDURE — 770001 HCHG ROOM/CARE - MED/SURG/GYN PRIV*

## 2022-04-17 PROCEDURE — 71045 X-RAY EXAM CHEST 1 VIEW: CPT

## 2022-04-17 PROCEDURE — A9270 NON-COVERED ITEM OR SERVICE: HCPCS | Performed by: STUDENT IN AN ORGANIZED HEALTH CARE EDUCATION/TRAINING PROGRAM

## 2022-04-17 PROCEDURE — 700102 HCHG RX REV CODE 250 W/ 637 OVERRIDE(OP): Performed by: EMERGENCY MEDICINE

## 2022-04-17 PROCEDURE — 700111 HCHG RX REV CODE 636 W/ 250 OVERRIDE (IP): Performed by: STUDENT IN AN ORGANIZED HEALTH CARE EDUCATION/TRAINING PROGRAM

## 2022-04-17 PROCEDURE — C9803 HOPD COVID-19 SPEC COLLECT: HCPCS | Performed by: EMERGENCY MEDICINE

## 2022-04-17 PROCEDURE — 36415 COLL VENOUS BLD VENIPUNCTURE: CPT

## 2022-04-17 PROCEDURE — A9270 NON-COVERED ITEM OR SERVICE: HCPCS | Performed by: EMERGENCY MEDICINE

## 2022-04-17 PROCEDURE — 0241U HCHG SARS-COV-2 COVID-19 NFCT DS RESP RNA 4 TRGT MIC: CPT

## 2022-04-17 PROCEDURE — 96367 TX/PROPH/DG ADDL SEQ IV INF: CPT

## 2022-04-17 PROCEDURE — 80053 COMPREHEN METABOLIC PANEL: CPT

## 2022-04-17 PROCEDURE — 700102 HCHG RX REV CODE 250 W/ 637 OVERRIDE(OP): Performed by: STUDENT IN AN ORGANIZED HEALTH CARE EDUCATION/TRAINING PROGRAM

## 2022-04-17 PROCEDURE — 96365 THER/PROPH/DIAG IV INF INIT: CPT

## 2022-04-17 PROCEDURE — 83605 ASSAY OF LACTIC ACID: CPT

## 2022-04-17 PROCEDURE — 700111 HCHG RX REV CODE 636 W/ 250 OVERRIDE (IP): Performed by: EMERGENCY MEDICINE

## 2022-04-17 PROCEDURE — 87077 CULTURE AEROBIC IDENTIFY: CPT

## 2022-04-17 PROCEDURE — 87181 SC STD AGAR DILUTION PER AGT: CPT

## 2022-04-17 PROCEDURE — 99223 1ST HOSP IP/OBS HIGH 75: CPT | Performed by: STUDENT IN AN ORGANIZED HEALTH CARE EDUCATION/TRAINING PROGRAM

## 2022-04-17 PROCEDURE — 700105 HCHG RX REV CODE 258: Performed by: STUDENT IN AN ORGANIZED HEALTH CARE EDUCATION/TRAINING PROGRAM

## 2022-04-17 PROCEDURE — 85025 COMPLETE CBC W/AUTO DIFF WBC: CPT

## 2022-04-17 PROCEDURE — 86140 C-REACTIVE PROTEIN: CPT

## 2022-04-17 PROCEDURE — 96366 THER/PROPH/DIAG IV INF ADDON: CPT

## 2022-04-17 PROCEDURE — 99285 EMERGENCY DEPT VISIT HI MDM: CPT

## 2022-04-17 PROCEDURE — 87040 BLOOD CULTURE FOR BACTERIA: CPT

## 2022-04-17 PROCEDURE — 85652 RBC SED RATE AUTOMATED: CPT

## 2022-04-17 RX ORDER — CARBAMAZEPINE 200 MG/1
200 TABLET ORAL 2 TIMES DAILY
Status: DISCONTINUED | OUTPATIENT
Start: 2022-04-17 | End: 2022-04-21 | Stop reason: HOSPADM

## 2022-04-17 RX ORDER — TRAZODONE HYDROCHLORIDE 50 MG/1
50 TABLET ORAL
Status: DISCONTINUED | OUTPATIENT
Start: 2022-04-17 | End: 2022-04-21 | Stop reason: HOSPADM

## 2022-04-17 RX ORDER — BISACODYL 10 MG
10 SUPPOSITORY, RECTAL RECTAL
Status: DISCONTINUED | OUTPATIENT
Start: 2022-04-17 | End: 2022-04-21 | Stop reason: HOSPADM

## 2022-04-17 RX ORDER — CEFAZOLIN SODIUM 2 G/100ML
2000 INJECTION, SOLUTION INTRAVENOUS ONCE
Status: COMPLETED | OUTPATIENT
Start: 2022-04-17 | End: 2022-04-17

## 2022-04-17 RX ORDER — SODIUM CHLORIDE, SODIUM LACTATE, POTASSIUM CHLORIDE, AND CALCIUM CHLORIDE .6; .31; .03; .02 G/100ML; G/100ML; G/100ML; G/100ML
30 INJECTION, SOLUTION INTRAVENOUS ONCE
Status: COMPLETED | OUTPATIENT
Start: 2022-04-17 | End: 2022-04-17

## 2022-04-17 RX ORDER — ONDANSETRON 4 MG/1
4 TABLET, ORALLY DISINTEGRATING ORAL EVERY 4 HOURS PRN
Status: DISCONTINUED | OUTPATIENT
Start: 2022-04-17 | End: 2022-04-21 | Stop reason: HOSPADM

## 2022-04-17 RX ORDER — TOPIRAMATE 25 MG/1
50 TABLET ORAL EVERY EVENING
Status: DISCONTINUED | OUTPATIENT
Start: 2022-04-17 | End: 2022-04-21 | Stop reason: HOSPADM

## 2022-04-17 RX ORDER — ACETAMINOPHEN 500 MG
1000 TABLET ORAL ONCE
Status: COMPLETED | OUTPATIENT
Start: 2022-04-17 | End: 2022-04-17

## 2022-04-17 RX ORDER — SODIUM CHLORIDE, SODIUM LACTATE, POTASSIUM CHLORIDE, CALCIUM CHLORIDE 600; 310; 30; 20 MG/100ML; MG/100ML; MG/100ML; MG/100ML
INJECTION, SOLUTION INTRAVENOUS CONTINUOUS
Status: DISCONTINUED | OUTPATIENT
Start: 2022-04-17 | End: 2022-04-20

## 2022-04-17 RX ORDER — ACETAMINOPHEN 325 MG/1
650 TABLET ORAL EVERY 6 HOURS PRN
Status: DISCONTINUED | OUTPATIENT
Start: 2022-04-17 | End: 2022-04-21 | Stop reason: HOSPADM

## 2022-04-17 RX ORDER — PROMETHAZINE HYDROCHLORIDE 25 MG/1
12.5-25 TABLET ORAL EVERY 4 HOURS PRN
Status: DISCONTINUED | OUTPATIENT
Start: 2022-04-17 | End: 2022-04-21 | Stop reason: HOSPADM

## 2022-04-17 RX ORDER — POLYETHYLENE GLYCOL 3350 17 G/17G
1 POWDER, FOR SOLUTION ORAL
Status: DISCONTINUED | OUTPATIENT
Start: 2022-04-17 | End: 2022-04-21 | Stop reason: HOSPADM

## 2022-04-17 RX ORDER — LOSARTAN POTASSIUM 50 MG/1
100 TABLET ORAL EVERY MORNING
Status: DISCONTINUED | OUTPATIENT
Start: 2022-04-18 | End: 2022-04-18

## 2022-04-17 RX ORDER — IBUPROFEN 600 MG/1
600 TABLET ORAL ONCE
Status: COMPLETED | OUTPATIENT
Start: 2022-04-17 | End: 2022-04-17

## 2022-04-17 RX ORDER — ARIPIPRAZOLE 15 MG/1
30 TABLET ORAL DAILY
Status: DISCONTINUED | OUTPATIENT
Start: 2022-04-18 | End: 2022-04-21 | Stop reason: HOSPADM

## 2022-04-17 RX ORDER — PROMETHAZINE HYDROCHLORIDE 25 MG/1
12.5-25 SUPPOSITORY RECTAL EVERY 4 HOURS PRN
Status: DISCONTINUED | OUTPATIENT
Start: 2022-04-17 | End: 2022-04-21 | Stop reason: HOSPADM

## 2022-04-17 RX ORDER — LABETALOL HYDROCHLORIDE 5 MG/ML
10 INJECTION, SOLUTION INTRAVENOUS EVERY 4 HOURS PRN
Status: DISCONTINUED | OUTPATIENT
Start: 2022-04-17 | End: 2022-04-21 | Stop reason: HOSPADM

## 2022-04-17 RX ORDER — ONDANSETRON 2 MG/ML
4 INJECTION INTRAMUSCULAR; INTRAVENOUS EVERY 4 HOURS PRN
Status: DISCONTINUED | OUTPATIENT
Start: 2022-04-17 | End: 2022-04-21 | Stop reason: HOSPADM

## 2022-04-17 RX ORDER — HYDROCHLOROTHIAZIDE 25 MG/1
25 TABLET ORAL EVERY MORNING
Status: DISCONTINUED | OUTPATIENT
Start: 2022-04-18 | End: 2022-04-18

## 2022-04-17 RX ORDER — PROCHLORPERAZINE EDISYLATE 5 MG/ML
5-10 INJECTION INTRAMUSCULAR; INTRAVENOUS EVERY 4 HOURS PRN
Status: DISCONTINUED | OUTPATIENT
Start: 2022-04-17 | End: 2022-04-21 | Stop reason: HOSPADM

## 2022-04-17 RX ADMIN — CARBAMAZEPINE 200 MG: 200 TABLET ORAL at 21:38

## 2022-04-17 RX ADMIN — TRAZODONE HYDROCHLORIDE 50 MG: 50 TABLET ORAL at 21:38

## 2022-04-17 RX ADMIN — IBUPROFEN 600 MG: 600 TABLET ORAL at 17:19

## 2022-04-17 RX ADMIN — SODIUM CHLORIDE, POTASSIUM CHLORIDE, SODIUM LACTATE AND CALCIUM CHLORIDE 2259 ML: 600; 310; 30; 20 INJECTION, SOLUTION INTRAVENOUS at 19:51

## 2022-04-17 RX ADMIN — CEFAZOLIN SODIUM 2000 MG: 2 INJECTION, SOLUTION INTRAVENOUS at 17:19

## 2022-04-17 RX ADMIN — ACETAMINOPHEN 1000 MG: 500 TABLET ORAL at 18:48

## 2022-04-17 RX ADMIN — VANCOMYCIN HYDROCHLORIDE 3 G: 500 INJECTION, POWDER, LYOPHILIZED, FOR SOLUTION INTRAVENOUS at 20:44

## 2022-04-17 RX ADMIN — TOPIRAMATE 50 MG: 25 TABLET, FILM COATED ORAL at 21:37

## 2022-04-17 ASSESSMENT — ENCOUNTER SYMPTOMS
FEVER: 1
COUGH: 0
DIARRHEA: 0
SHORTNESS OF BREATH: 0
VOMITING: 0
ABDOMINAL PAIN: 0
CHILLS: 1
NAUSEA: 0
CONSTIPATION: 0

## 2022-04-17 ASSESSMENT — FIBROSIS 4 INDEX: FIB4 SCORE: 0.51

## 2022-04-17 NOTE — ED PROVIDER NOTES
"ED Provider Note    CHIEF COMPLAINT  Chief Complaint   Patient presents with   • Foot Pain     L sided. \"dry skin cut\" on the bottom of pt's toe     • Anxiety       HPI  Adama Burt III is a 26 y.o. male who presents feeling anxious, chills today, complaining of left foot pain.  He has hyperkeratosis of both feet, with deep cracks in the skin.  He states he normally gets a pedicure every 2 weeks to help with the problem otherwise did not put cream on his feet.  He is developed a small amount of erythema to the medial aspect of his left foot.  He denies cough.  No neck stiffness, no headache.  No chest pain, no sore throat.  Patient states rhinorrhea earlier today now resolved.  He has been vaccinated for COVID, denies known contacts.  No vomiting or diarrhea.  No abdominal pain.    REVIEW OF SYSTEMS    Constitutional: Fever subjective  Respiratory: No cough or shortness of breath  Cardiac: No chest pain or syncope  Gastrointestinal: No abdominal pain, no vomiting or diarrhea  Musculoskeletal: No acute neck or back pain, no flank pain  Neurologic: No headache  Genitourinary: No dysuria  Endocrine: Denies diabetes     All other systems are negative.       PAST MEDICAL HISTORY  Past Medical History:   Diagnosis Date   • ADHD (attention deficit hyperactivity disorder)    • Apnea, sleep    • Bipolar 1 disorder (HCC)    • Blood clotting disorder (HCC)     DVT LLE - April 2021   • Breath shortness     with exertion, pt states chronic    • Daytime sleepiness    • Gasping for breath    • High cholesterol    • Hypertension    • Obesity    • ODD (oppositional defiant disorder)    • Pneumonia 04/2021   • Sleep apnea     cpap, pt uses o2 with cpap machine unsure how many liters    • Snoring        FAMILY HISTORY  Family History   Family history unknown: Yes       SOCIAL HISTORY  Social History     Socioeconomic History   • Marital status: Single   Tobacco Use   • Smoking status: Never Smoker   • Smokeless tobacco: " "Never Used   Vaping Use   • Vaping Use: Never used   Substance and Sexual Activity   • Alcohol use: No     Comment: rare    • Drug use: No       SURGICAL HISTORY  Past Surgical History:   Procedure Laterality Date   • W[place] LAP, CHRISS RESTRICT PROC, LONGITUDINAL GAS* N/A 10/29/2021    Procedure: GASTRECTOMY, SLEEVE, LAPAROSCOPIC;  Surgeon: Joni Quiles M.D.;  Location: SURGERY [facility];  Service: General   • TONSILLECTOMY  3/17/2010    Performed by ANKIT HERNANDEZ at SURGERY SAME DAY [facility] ORS   • APPENDECTOMY CHILD     • OTHER      wisdom teeth extraction        CURRENT MEDICATIONS  Home Medications     Reviewed by Heather Warner R.N. (Registered Nurse) on 04/17/22 at 1614  Med List Status: <None>   Medication Last Dose Status   acetaminophen (TYLENOL) 500 MG Tab  Active   aripiprazole (ABILIFY) 30 MG tablet  Active   aspirin (ASA) 81 MG Chew Tab chewable tablet  Active   atorvastatin (LIPITOR) 20 MG Tab  Active   CALCIUM PO  Active   carBAMazepine (TEGRETOL) 200 MG Tab  Active   Cholecalciferol (VITAMIN D) 2000 UNIT Tab  Active   hydroCHLOROthiazide (HYDRODIURIL) 25 MG Tab  Active   ketoconazole (NIZORAL) 2 % Cream  Active   losartan (COZAAR) 100 MG Tab  Active   metFORMIN ER (GLUCOPHAGE XR) 750 MG TABLET SR 24 HR  Active   Multiple Vitamin (MULTIVITAMIN ADULT PO)  Active   nystatin (MYCOSTATIN) powder  Active   topiramate (TOPAMAX) 50 MG tablet  Active   traZODone (DESYREL) 50 MG Tab  Active   VENTOLIN  (90 Base) MCG/ACT Aero Soln inhalation aerosol  Active                ALLERGIES  No Known Allergies    PHYSICAL EXAM  VITAL SIGNS: /57   Pulse (!) 143   Temp (!) 38.1 °C (100.6 °F)   Resp (!) 22   Ht 1.803 m (5' 11\")   Wt (!) 178 kg (392 lb 6.7 oz)   SpO2 96%   BMI 54.73 kg/m²   Constitutional:  Non-toxic appearance.   HENT: No facial swelling  Eyes: Anicteric, no conjunctivitis.     Cardiovascular: Tachycardic heart rate, Normal rhythm  Pulmonary:  No wheezing, No rales.  Equal " bilateral air movement  Gastrointestinal: Soft, protuberant, no tenderness, No distention  Skin: Warm, Dry, No cyanosis.  Hyperkeratosis both feet, erythematous change medial aspect left foot.  No proximal lymphangitis.  Neurologic: Speech is clear, follows commands, facial expression is symmetrical.  Psychiatric: Anxious.  Affect normal,  Mood normal.    Musculoskeletal: Bilateral heel and foot pain to palpation.  Neck is supple, no meningeal signs    EKG/Labs  Results for orders placed or performed during the hospital encounter of 04/17/22   CBC With Differential   Result Value Ref Range    WBC 15.9 (H) 4.8 - 10.8 K/uL    RBC 5.69 4.70 - 6.10 M/uL    Hemoglobin 17.0 14.0 - 18.0 g/dL    Hematocrit 50.5 42.0 - 52.0 %    MCV 88.8 81.4 - 97.8 fL    MCH 29.9 27.0 - 33.0 pg    MCHC 33.7 33.7 - 35.3 g/dL    RDW 41.6 35.9 - 50.0 fL    Platelet Count 229 164 - 446 K/uL    MPV 11.1 9.0 - 12.9 fL    Neutrophils-Polys 92.20 (H) 44.00 - 72.00 %    Lymphocytes 3.10 (L) 22.00 - 41.00 %    Monocytes 3.60 0.00 - 13.40 %    Eosinophils 0.10 0.00 - 6.90 %    Basophils 0.20 0.00 - 1.80 %    Immature Granulocytes 0.80 0.00 - 0.90 %    Nucleated RBC 0.00 /100 WBC    Neutrophils (Absolute) 14.69 (H) 1.82 - 7.42 K/uL    Lymphs (Absolute) 0.50 (L) 1.00 - 4.80 K/uL    Monos (Absolute) 0.57 0.00 - 0.85 K/uL    Eos (Absolute) 0.01 0.00 - 0.51 K/uL    Baso (Absolute) 0.03 0.00 - 0.12 K/uL    Immature Granulocytes (abs) 0.12 (H) 0.00 - 0.11 K/uL    NRBC (Absolute) 0.00 K/uL   Comp Metabolic Panel   Result Value Ref Range    Sodium 138 135 - 145 mmol/L    Potassium 3.9 3.6 - 5.5 mmol/L    Chloride 101 96 - 112 mmol/L    Co2 21 20 - 33 mmol/L    Anion Gap 16.0 7.0 - 16.0    Glucose 109 (H) 65 - 99 mg/dL    Bun 22 8 - 22 mg/dL    Creatinine 1.18 0.50 - 1.40 mg/dL    Calcium 9.9 8.5 - 10.5 mg/dL    AST(SGOT) 49 (H) 12 - 45 U/L    ALT(SGPT) 53 (H) 2 - 50 U/L    Alkaline Phosphatase 154 (H) 30 - 99 U/L    Total Bilirubin 1.3 0.1 - 1.5 mg/dL     Albumin 4.8 3.2 - 4.9 g/dL    Total Protein 8.0 6.0 - 8.2 g/dL    Globulin 3.2 1.9 - 3.5 g/dL    A-G Ratio 1.5 g/dL   Lactic Acid   Result Value Ref Range    Lactic Acid 2.2 (H) 0.5 - 2.0 mmol/L   Urinalysis    Specimen: Respirate   Result Value Ref Range    Color Yellow     Character Clear     Specific Gravity 1.017 <1.035    Ph 6.5 5.0 - 8.0    Glucose Negative Negative mg/dL    Ketones 15 (A) Negative mg/dL    Protein 100 (A) Negative mg/dL    Bilirubin Negative Negative    Urobilinogen, Urine 1.0 Negative    Nitrite Negative Negative    Leukocyte Esterase Negative Negative    Occult Blood Negative Negative    Micro Urine Req Microscopic    CoV-2, FLU A/B, and RSV by PCR (2-4 Hours CEPHEID) : Collect NP swab in VTM    Specimen: Respirate   Result Value Ref Range    Influenza virus A RNA Negative Negative    Influenza virus B, PCR Negative Negative    RSV, PCR Negative Negative    SARS-CoV-2 by PCR NotDetected     SARS-CoV-2 Source NP Swab    URINE MICROSCOPIC (W/UA)   Result Value Ref Range    WBC 0-2 (A) /hpf    RBC 0-2 (A) /hpf    Bacteria Negative None /hpf    Epithelial Cells Negative /hpf    Hyaline Cast 3-5 (A) /lpf   ESTIMATED GFR   Result Value Ref Range    GFR (CKD-EPI) 86 >60 mL/min/1.73 m 2         RADIOLOGY/PROCEDURES  DX-CHEST-PORTABLE (1 VIEW)   Final Result         1. No acute cardiopulmonary abnormalities are identified.            COURSE & MEDICAL DECISION MAKING  Pertinent Labs & Imaging studies reviewed. (See chart for details)  Patient presents febrile, tachycardic with elevated white blood cell count concerning for sepsis.  Lactic acid returns elevated at 2.2.  Source appears to be cellulitis in the left foot, likely from the deep cracks in the thickened skin of his foot.  There is some erythematous change to the foot.  Urinalysis negative, chest x-ray negative, Covid and influenza testing are negative.  His abdominal exam is benign, soft and nontender.  He has no headache, no neck stiffness,  meningitis unlikely.  Patient's temperature spike to 104, was treated with both Motrin and Tylenol.  Patient given IV Ancef for simple sepsis, source of cellulitis.  He is hospitalized for evaluation and treatment.    FINAL IMPRESSION     1. Sepsis, due to unspecified organism, unspecified whether acute organ dysfunction present (HCC)     2. Cellulitis, unspecified cellulitis site      Left foot                   Electronically signed by: Hunter Bell M.D., 4/17/2022 4:48 PM
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
## 2022-04-18 ENCOUNTER — APPOINTMENT (OUTPATIENT)
Dept: RADIOLOGY | Facility: MEDICAL CENTER | Age: 27
DRG: 872 | End: 2022-04-18
Attending: STUDENT IN AN ORGANIZED HEALTH CARE EDUCATION/TRAINING PROGRAM
Payer: MEDICAID

## 2022-04-18 PROBLEM — R65.20 SEPSIS DUE TO STREPTOCOCCUS SPECIES WITH ACUTE RENAL FAILURE (HCC): Status: ACTIVE | Noted: 2022-04-17

## 2022-04-18 PROBLEM — N17.9 AKI (ACUTE KIDNEY INJURY) (HCC): Status: ACTIVE | Noted: 2022-04-18

## 2022-04-18 PROBLEM — N17.9 SEPSIS DUE TO STREPTOCOCCUS SPECIES WITH ACUTE RENAL FAILURE (HCC): Status: ACTIVE | Noted: 2022-04-17

## 2022-04-18 PROBLEM — A40.9 SEPSIS DUE TO STREPTOCOCCUS SPECIES WITH ACUTE RENAL FAILURE (HCC): Status: ACTIVE | Noted: 2022-04-17

## 2022-04-18 LAB
ALBUMIN SERPL BCP-MCNC: 4 G/DL (ref 3.2–4.9)
ALBUMIN/GLOB SERPL: 1.4 G/DL
ALP SERPL-CCNC: 129 U/L (ref 30–99)
ALT SERPL-CCNC: 41 U/L (ref 2–50)
ANION GAP SERPL CALC-SCNC: 13 MMOL/L (ref 7–16)
AST SERPL-CCNC: 27 U/L (ref 12–45)
BASOPHILS # BLD AUTO: 0.3 % (ref 0–1.8)
BASOPHILS # BLD: 0.09 K/UL (ref 0–0.12)
BILIRUB SERPL-MCNC: 1.8 MG/DL (ref 0.1–1.5)
BUN SERPL-MCNC: 29 MG/DL (ref 8–22)
CALCIUM SERPL-MCNC: 9.4 MG/DL (ref 8.5–10.5)
CHLORIDE SERPL-SCNC: 99 MMOL/L (ref 96–112)
CO2 SERPL-SCNC: 24 MMOL/L (ref 20–33)
CREAT SERPL-MCNC: 1.55 MG/DL (ref 0.5–1.4)
EOSINOPHIL # BLD AUTO: 0.11 K/UL (ref 0–0.51)
EOSINOPHIL NFR BLD: 0.4 % (ref 0–6.9)
ERYTHROCYTE [DISTWIDTH] IN BLOOD BY AUTOMATED COUNT: 42.5 FL (ref 35.9–50)
GFR SERPLBLD CREATININE-BSD FMLA CKD-EPI: 62 ML/MIN/1.73 M 2
GLOBULIN SER CALC-MCNC: 2.9 G/DL (ref 1.9–3.5)
GLUCOSE SERPL-MCNC: 117 MG/DL (ref 65–99)
HAV IGM SERPL QL IA: NORMAL
HBV CORE IGM SER QL: NORMAL
HBV SURFACE AG SER QL: NORMAL
HCT VFR BLD AUTO: 46.3 % (ref 42–52)
HCV AB SER QL: NORMAL
HGB BLD-MCNC: 15.7 G/DL (ref 14–18)
IMM GRANULOCYTES # BLD AUTO: 0.39 K/UL (ref 0–0.11)
IMM GRANULOCYTES NFR BLD AUTO: 1.5 % (ref 0–0.9)
LACTATE BLD-SCNC: 2.3 MMOL/L (ref 0.5–2)
LYMPHOCYTES # BLD AUTO: 0.6 K/UL (ref 1–4.8)
LYMPHOCYTES NFR BLD: 2.2 % (ref 22–41)
MCH RBC QN AUTO: 30 PG (ref 27–33)
MCHC RBC AUTO-ENTMCNC: 33.9 G/DL (ref 33.7–35.3)
MCV RBC AUTO: 88.4 FL (ref 81.4–97.8)
MONOCYTES # BLD AUTO: 1.05 K/UL (ref 0–0.85)
MONOCYTES NFR BLD AUTO: 3.9 % (ref 0–13.4)
NEUTROPHILS # BLD AUTO: 24.62 K/UL (ref 1.82–7.42)
NEUTROPHILS NFR BLD: 91.7 % (ref 44–72)
NRBC # BLD AUTO: 0 K/UL
NRBC BLD-RTO: 0 /100 WBC
PLATELET # BLD AUTO: 210 K/UL (ref 164–446)
PMV BLD AUTO: 11.2 FL (ref 9–12.9)
POTASSIUM SERPL-SCNC: 4 MMOL/L (ref 3.6–5.5)
PROT SERPL-MCNC: 6.9 G/DL (ref 6–8.2)
RBC # BLD AUTO: 5.24 M/UL (ref 4.7–6.1)
SODIUM SERPL-SCNC: 136 MMOL/L (ref 135–145)
WBC # BLD AUTO: 26.9 K/UL (ref 4.8–10.8)

## 2022-04-18 PROCEDURE — 85025 COMPLETE CBC W/AUTO DIFF WBC: CPT

## 2022-04-18 PROCEDURE — 76705 ECHO EXAM OF ABDOMEN: CPT

## 2022-04-18 PROCEDURE — 99233 SBSQ HOSP IP/OBS HIGH 50: CPT | Performed by: INTERNAL MEDICINE

## 2022-04-18 PROCEDURE — 700105 HCHG RX REV CODE 258: Performed by: STUDENT IN AN ORGANIZED HEALTH CARE EDUCATION/TRAINING PROGRAM

## 2022-04-18 PROCEDURE — 700111 HCHG RX REV CODE 636 W/ 250 OVERRIDE (IP): Performed by: STUDENT IN AN ORGANIZED HEALTH CARE EDUCATION/TRAINING PROGRAM

## 2022-04-18 PROCEDURE — 700102 HCHG RX REV CODE 250 W/ 637 OVERRIDE(OP): Performed by: STUDENT IN AN ORGANIZED HEALTH CARE EDUCATION/TRAINING PROGRAM

## 2022-04-18 PROCEDURE — 96372 THER/PROPH/DIAG INJ SC/IM: CPT

## 2022-04-18 PROCEDURE — 770001 HCHG ROOM/CARE - MED/SURG/GYN PRIV*

## 2022-04-18 PROCEDURE — 36415 COLL VENOUS BLD VENIPUNCTURE: CPT

## 2022-04-18 PROCEDURE — 96366 THER/PROPH/DIAG IV INF ADDON: CPT

## 2022-04-18 PROCEDURE — 80053 COMPREHEN METABOLIC PANEL: CPT

## 2022-04-18 PROCEDURE — A9270 NON-COVERED ITEM OR SERVICE: HCPCS | Performed by: STUDENT IN AN ORGANIZED HEALTH CARE EDUCATION/TRAINING PROGRAM

## 2022-04-18 PROCEDURE — 80074 ACUTE HEPATITIS PANEL: CPT

## 2022-04-18 PROCEDURE — 83605 ASSAY OF LACTIC ACID: CPT

## 2022-04-18 PROCEDURE — 96367 TX/PROPH/DG ADDL SEQ IV INF: CPT

## 2022-04-18 RX ADMIN — VANCOMYCIN HYDROCHLORIDE 2000 MG: 500 INJECTION, POWDER, LYOPHILIZED, FOR SOLUTION INTRAVENOUS at 08:52

## 2022-04-18 RX ADMIN — ENOXAPARIN SODIUM 40 MG: 40 INJECTION SUBCUTANEOUS at 17:42

## 2022-04-18 RX ADMIN — SODIUM CHLORIDE 3 G: 900 INJECTION INTRAVENOUS at 12:00

## 2022-04-18 RX ADMIN — SODIUM CHLORIDE, POTASSIUM CHLORIDE, SODIUM LACTATE AND CALCIUM CHLORIDE: 600; 310; 30; 20 INJECTION, SOLUTION INTRAVENOUS at 01:34

## 2022-04-18 RX ADMIN — ACETAMINOPHEN 650 MG: 325 TABLET, FILM COATED ORAL at 21:15

## 2022-04-18 RX ADMIN — SODIUM CHLORIDE, POTASSIUM CHLORIDE, SODIUM LACTATE AND CALCIUM CHLORIDE: 600; 310; 30; 20 INJECTION, SOLUTION INTRAVENOUS at 13:17

## 2022-04-18 RX ADMIN — SODIUM CHLORIDE 3 G: 900 INJECTION INTRAVENOUS at 17:42

## 2022-04-18 RX ADMIN — CARBAMAZEPINE 200 MG: 200 TABLET ORAL at 17:41

## 2022-04-18 RX ADMIN — TRAZODONE HYDROCHLORIDE 50 MG: 50 TABLET ORAL at 22:10

## 2022-04-18 RX ADMIN — SODIUM CHLORIDE, POTASSIUM CHLORIDE, SODIUM LACTATE AND CALCIUM CHLORIDE: 600; 310; 30; 20 INJECTION, SOLUTION INTRAVENOUS at 22:01

## 2022-04-18 RX ADMIN — ARIPIPRAZOLE 30 MG: 15 TABLET ORAL at 06:49

## 2022-04-18 RX ADMIN — TOPIRAMATE 50 MG: 25 TABLET, FILM COATED ORAL at 17:41

## 2022-04-18 RX ADMIN — ACETAMINOPHEN 650 MG: 325 TABLET, FILM COATED ORAL at 09:35

## 2022-04-18 RX ADMIN — SODIUM CHLORIDE 3 G: 900 INJECTION INTRAVENOUS at 06:52

## 2022-04-18 RX ADMIN — SODIUM CHLORIDE 3 G: 900 INJECTION INTRAVENOUS at 00:34

## 2022-04-18 RX ADMIN — ACETAMINOPHEN 650 MG: 325 TABLET, FILM COATED ORAL at 15:49

## 2022-04-18 RX ADMIN — ENOXAPARIN SODIUM 40 MG: 40 INJECTION SUBCUTANEOUS at 06:50

## 2022-04-18 RX ADMIN — CARBAMAZEPINE 200 MG: 200 TABLET ORAL at 09:14

## 2022-04-18 ASSESSMENT — LIFESTYLE VARIABLES
CONSUMPTION TOTAL: NEGATIVE
ON A TYPICAL DAY WHEN YOU DRINK ALCOHOL HOW MANY DRINKS DO YOU HAVE: 0
EVER HAD A DRINK FIRST THING IN THE MORNING TO STEADY YOUR NERVES TO GET RID OF A HANGOVER: NO
TOTAL SCORE: 0
HAVE PEOPLE ANNOYED YOU BY CRITICIZING YOUR DRINKING: NO
DOES PATIENT WANT TO STOP DRINKING: NO
AVERAGE NUMBER OF DAYS PER WEEK YOU HAVE A DRINK CONTAINING ALCOHOL: 0
ALCOHOL_USE: NO
HAVE YOU EVER FELT YOU SHOULD CUT DOWN ON YOUR DRINKING: NO
HOW MANY TIMES IN THE PAST YEAR HAVE YOU HAD 5 OR MORE DRINKS IN A DAY: 0
EVER FELT BAD OR GUILTY ABOUT YOUR DRINKING: NO

## 2022-04-18 ASSESSMENT — COGNITIVE AND FUNCTIONAL STATUS - GENERAL
SUGGESTED CMS G CODE MODIFIER MOBILITY: CH
DAILY ACTIVITIY SCORE: 24
MOBILITY SCORE: 24
SUGGESTED CMS G CODE MODIFIER DAILY ACTIVITY: CH

## 2022-04-18 ASSESSMENT — ENCOUNTER SYMPTOMS
CHILLS: 1
COUGH: 0
SHORTNESS OF BREATH: 0
FEVER: 1
DIARRHEA: 0
NAUSEA: 0
ABDOMINAL PAIN: 0
VOMITING: 0
CONSTIPATION: 0

## 2022-04-18 ASSESSMENT — PATIENT HEALTH QUESTIONNAIRE - PHQ9
1. LITTLE INTEREST OR PLEASURE IN DOING THINGS: NOT AT ALL
8. MOVING OR SPEAKING SO SLOWLY THAT OTHER PEOPLE COULD HAVE NOTICED. OR THE OPPOSITE, BEING SO FIGETY OR RESTLESS THAT YOU HAVE BEEN MOVING AROUND A LOT MORE THAN USUAL: NOT AT ALL
4. FEELING TIRED OR HAVING LITTLE ENERGY: NOT AT ALL
5. POOR APPETITE OR OVEREATING: NOT AT ALL
9. THOUGHTS THAT YOU WOULD BE BETTER OFF DEAD, OR OF HURTING YOURSELF: NOT AT ALL
6. FEELING BAD ABOUT YOURSELF - OR THAT YOU ARE A FAILURE OR HAVE LET YOURSELF OR YOUR FAMILY DOWN: SEVERAL DAYS
SUM OF ALL RESPONSES TO PHQ9 QUESTIONS 1 AND 2: 1
3. TROUBLE FALLING OR STAYING ASLEEP OR SLEEPING TOO MUCH: NOT AT ALL
2. FEELING DOWN, DEPRESSED, IRRITABLE, OR HOPELESS: SEVERAL DAYS
7. TROUBLE CONCENTRATING ON THINGS, SUCH AS READING THE NEWSPAPER OR WATCHING TELEVISION: SEVERAL DAYS
SUM OF ALL RESPONSES TO PHQ QUESTIONS 1-9: 3

## 2022-04-18 ASSESSMENT — PAIN DESCRIPTION - PAIN TYPE
TYPE: ACUTE PAIN

## 2022-04-18 NOTE — ED NOTES
Call from Lab called with critical result of blood culture gram + cocci and chains at 0607. Critical lab result read back to lab.   Dr. Lopez notified of critical lab result at 0609

## 2022-04-18 NOTE — PROGRESS NOTES
Checked on patient at 1430 and patient complained of being very cold and appeared to have chills. Temporal temperature checked was 100.3f, oral temp was 99.5f. PRN tylenol for 100.5f and not due for another hour. MD paged for alternative options, no new orders placed.     1600: CNA informed this RN that patient's temperature was now 102.4f. MD paged again, no new orders placed d/t kidney function. This RN gave PRN tylenol per MAR. Will recheck again in 1 hour.     1715: rechecked pt's temperature 1 hour after tylenol administration, oral temperature is still 102.3f. MD paged. Patient provided with ice packs for armpits and forehead and removed all blankets and socks.

## 2022-04-18 NOTE — PROGRESS NOTES
[facility] Medicine Daily Progress Note    Date of Service  4/18/2022    Chief Complaint  Adama Burt III is a 26 y.o. male admitted 4/17/2022 with left foot pain    Hospital Course  Adama Burt III is a 26 y.o. male who presented 4/17/2022 with bipolar, morbid obesity, apnea who presents with left foot pain.  Patient reports that this afternoon he started noticing fevers and chills and malaise.  He reports has had some left foot pain with hyperkeratosis of both feet with deep cracks in the skin.  He reports he typically has a pedicure done every 2 weeks but has not had one in a few weeks.  He denies any trauma to his foot.  He reports that the pain today has been excruciating and he developed some small amount of erythema.  He reports that at home his fever was 106 and he decided to be evaluated in the ED.  He denies any sick contacts.  He has been vaccinated for COVID.      Interval Problem Update  CBC increased to 26.9 from 15.9. Continued fevers. Tylenol ordered.  Blood culture with 1/2 bottles with GPC.    No evidence of purulent discharge. Discontinued vancomycin per antibiotic stewardship team recommendation, appreciate recs.  Continue Unasyn.  Wound care consulted, appreciate assistance.    I have personally seen and examined the patient at bedside. I discussed the plan of care with patient and bedside RN.    Consultants/Specialty  none    Code Status  Full Code    Disposition  Patient is not medically cleared for discharge.   Anticipate discharge to to home with close outpatient follow-up.  I have placed the appropriate orders for post-discharge needs.    Review of Systems  Review of Systems   Constitutional: Positive for chills and fever.   Respiratory: Negative for cough and shortness of breath.    Cardiovascular: Negative for chest pain.   Gastrointestinal: Negative for abdominal pain, constipation, diarrhea, nausea and vomiting.   Genitourinary: Negative for dysuria.    Musculoskeletal:        Left foot pain   All other systems reviewed and are negative.       Physical Exam  Temp:  [37.3 °C (99.2 °F)-40.1 °C (104.1 °F)] 38 °C (100.4 °F)  Pulse:  [] 98  Resp:  [18-45] 45  BP: ()/(56-77) 98/77  SpO2:  [87 %-96 %] 87 %    Physical Exam  Vitals and nursing note reviewed.   Constitutional:       General: He is not in acute distress.     Appearance: Normal appearance. He is obese. He is ill-appearing.   HENT:      Head: Normocephalic and atraumatic.   Eyes:      General: No scleral icterus.        Right eye: No discharge.         Left eye: No discharge.   Cardiovascular:      Rate and Rhythm: Normal rate and regular rhythm.      Heart sounds: Normal heart sounds.   Pulmonary:      Effort: No respiratory distress.      Breath sounds: Normal breath sounds. No wheezing or rales.   Abdominal:      General: Abdomen is flat. Bowel sounds are normal. There is no distension.      Tenderness: There is no abdominal tenderness.   Musculoskeletal:         General: Tenderness (Left foot tenderness) present.      Left lower leg: Edema present.      Comments: Left foot with ulceration between first and second toe. Erythema present, extending from foot to mid-calf.   Neurological:      General: No focal deficit present.      Mental Status: He is alert and oriented to person, place, and time.   Psychiatric:         Attention and Perception: Attention normal.         Speech: Speech normal.         Behavior: Behavior is cooperative.         Judgment: Judgment is not impulsive.         Fluids  No intake or output data in the 24 hours ending 04/18/22 1135    Laboratory  Recent Labs     04/17/22 1702 04/18/22  0133   WBC 15.9* 26.9*   RBC 5.69 5.24   HEMOGLOBIN 17.0 15.7   HEMATOCRIT 50.5 46.3   MCV 88.8 88.4   MCH 29.9 30.0   MCHC 33.7 33.9   RDW 41.6 42.5   PLATELETCT 229 210   MPV 11.1 11.2     Recent Labs     04/17/22 1702 04/18/22 0133   SODIUM 138 136   POTASSIUM 3.9 4.0   CHLORIDE 101  99   CO2 21 24   GLUCOSE 109* 117*   BUN 22 29*   CREATININE 1.18 1.55*   CALCIUM 9.9 9.4                   Imaging  US-RUQ   Final Result      Possible mild RIGHT hydronephrosis      DX-CHEST-PORTABLE (1 VIEW)   Final Result         1. No acute cardiopulmonary abnormalities are identified.           Assessment/Plan  * Sepsis due to Streptococcus species with acute renal failure (HCC)- (present on admission)  Assessment & Plan  This is Sepsis Present on admission  SIRS criteria identified on my evaluation include: Fever, with temperature greater than 101 deg F and Leukocytosis, with WBC greater than 12,000  Source is leg cellulitis  End organ dysfunction as evidenced by acute renal failure without other suspected cause  Sepsis protocol initiated  Fluid resuscitation ordered per protocol  Crystalloid Fluid Administration: Fluid resuscitation ordered per standard protocol - 30 mL/kg per current or ideal body weight  IV antibiotics as appropriate for source of sepsis  Reassessment: I have reassessed the patient's hemodynamic status  covid neg, UA neg  Blood culture 1/2 bottles with GPCs. Follow up second bottle and speciation.  Continue Unasyn    KIERA (acute kidney injury) (HCC)- (present on admission)  Assessment & Plan  Cr increased to 1.55 from baseline 0.8.  Vancomycin discontinued.  Continue IVF.  Continue treatment for sepsis as noted.  Avoid NSAIDs and other nephrotoxic medications.  Monitor daily.  Consider Nephrology assistance if no improvement with treatment for sepsis.    Cellulitis of left foot- (present on admission)  Assessment & Plan  Patient presenting with left foot cellulitis.  Patient has significant tenderness to his left foot.  He denies any recent trauma or pedicures.  He reports he has cracks in his feet that are very painful.  Likely the source of the patient's sepsis.  Discontinued vancomycin per antibiotic stewardship team recommendation, appreciate recs.  Continue Unasyn.  Wound care  consulted, appreciate assistance.  Consider imaging (likely w/o contrast due to renal function) and/or ortho consult if no improvement.    Transaminitis- (present on admission)  Assessment & Plan  AST 49, ALT 53, Alk P 154, T bili 1.3.  Possibly due to fatty liver  Hepatitis panel ordered  Daily CMP  Right upper quadrant ultrasound    Proteinuria- (present on admission)  Assessment & Plan  Proteinuria noted in the patient's urinalysis. Patient also has KIERA.  Consider Nephrology assistance if no improvement with treatment for sepsis.    Essential hypertension- (present on admission)  Assessment & Plan  BP low to normal.  Hold home hydrochlorothiazide and losartan in the setting of sepsis.  As needed labetalol    Bipolar disorder, current episode mixed, moderate (HCC)- (present on admission)  Assessment & Plan  Continue home medications.    Morbid obesity (HCC)- (present on admission)  Assessment & Plan  BMI 54.73.  He reports that prior to his surgery he weighed over 500 pounds.  Patient reports that he had a weight loss surgery performed in October 2021 with no complications.  Diet, exercise and weight loss    Central sleep apnea- (present on admission)  Assessment & Plan  Likely due to morbid obesity       VTE prophylaxis: SCDs/TEDs and enoxaparin ppx    I have performed a physical exam and reviewed and updated ROS and Plan today (4/18/2022). In review of yesterday's note (4/17/2022), there are no changes except as documented above.

## 2022-04-18 NOTE — H&P
"Hospital Medicine History & Physical Note    Date of Service  4/17/2022    Primary Care Physician  Pcp Pt States None    Consultants  none    Specialist Names: n/a    Code Status  Full Code    Chief Complaint  Chief Complaint   Patient presents with   • Foot Pain     L sided. \"dry skin cut\" on the bottom of pt's toe     • Anxiety       History of Presenting Illness  Adama Burt III is a 26 y.o. male who presented 4/17/2022 with bipolar, morbid obesity, apnea who presents with left foot pain.  Patient reports that this afternoon he started noticing fevers and chills and malaise.  He reports has had some left foot pain with hyperkeratosis of both feet with deep cracks in the skin.  He reports he typically has a pedicure done every 2 weeks but has not had one in a few weeks.  He denies any trauma to his foot.  He reports that the pain today has been excruciating and he developed some small amount of erythema.  He reports that at home his fever was 106 and he decided to be evaluated in the ED.  He denies any sick contacts.  He has been vaccinated for COVID.    I discussed the plan of care with patient and bedside RN.    Review of Systems  Review of Systems   Constitutional: Positive for chills and fever.   Respiratory: Negative for cough and shortness of breath.    Cardiovascular: Negative for chest pain.   Gastrointestinal: Negative for abdominal pain, constipation, diarrhea, nausea and vomiting.   Genitourinary: Negative for dysuria.   Musculoskeletal:        Left foot pain   All other systems reviewed and are negative.      Past Medical History   has a past medical history of ADHD (attention deficit hyperactivity disorder), Apnea, sleep, Bipolar 1 disorder (HCC), Blood clotting disorder (HCC), Breath shortness, Daytime sleepiness, Gasping for breath, High cholesterol, Hypertension, Obesity, ODD (oppositional defiant disorder), Pneumonia (04/2021), Sleep apnea, and Snoring.    Surgical History   has a " past surgical history that includes appendectomy child; tonsillectomy (3/17/2010); other; and pr lap, pieter restrict proc, longitudinal gas* (N/A, 10/29/2021).     Family History  Patient does not know family history well but reports that his parents were both obese  Family history reviewed with patient. There is no family history that is pertinent to the chief complaint.     Social History   reports that he has never smoked. He has never used smokeless tobacco. He reports that he does not drink alcohol and does not use drugs.    Allergies  No Known Allergies    Medications  Prior to Admission Medications   Prescriptions Last Dose Informant Patient Reported? Taking?   acetaminophen (TYLENOL) 500 MG Tab 4/17/2022 at 0800 Patient Yes No   Sig: Take 1,000 mg by mouth 2 times a day as needed. Indications: Pain   aripiprazole (ABILIFY) 30 MG tablet 4/17/2022 at 0800 Patient Yes No   Sig: Take 30 mg by mouth every day.   atorvastatin (LIPITOR) 20 MG Tab 4/17/2022 at 0800 Patient Yes No   Sig: Take 20 mg by mouth every morning.   carBAMazepine (TEGRETOL) 200 MG Tab 4/17/2022 at 0800 Patient Yes No   Sig: Take 200 mg by mouth 2 times a day.   hydroCHLOROthiazide (HYDRODIURIL) 25 MG Tab 4/17/2022 at 0800 Patient Yes No   Sig: Take 25 mg by mouth every morning.   losartan (COZAAR) 100 MG Tab 4/17/2022 at 0800 Patient Yes No   Sig: Take 100 mg by mouth every morning.   metFORMIN ER (GLUCOPHAGE XR) 750 MG TABLET SR 24 HR 4/17/2022 at 0800 Patient No No   Sig: TAKE 1 TABLET BY MOUTH ONCE DAILY.   Patient taking differently: Take 750 mg by mouth every morning. TAKE 1 TABLET BY MOUTH ONCE DAILY.   nystatin (MYCOSTATIN) powder 4/17/2022 at 0800 Patient Yes No   Sig: Apply 1 Application topically 4 times a day. Apply to folds   topiramate (TOPAMAX) 50 MG tablet 4/16/2022 at 1900 Patient No No   Sig: Take 1 tablet by mouth every evening.   traZODone (DESYREL) 50 MG Tab 4/16/2022 at 2100 Patient Yes No   Sig: Take 50 mg by mouth at  bedtime.      Facility-Administered Medications: None       Physical Exam  Temp:  [37.7 °C (99.8 °F)-40.1 °C (104.1 °F)] 37.7 °C (99.8 °F)  Pulse:  [] 95  Resp:  [22-25] 25  BP: (112-158)/(56-73) 122/56  SpO2:  [90 %-96 %] 92 %  Blood Pressure: 122/56   Temperature: 37.7 °C (99.8 °F)   Pulse: 95   Respiration: (!) 25   Pulse Oximetry: 92 %       Physical Exam  Vitals and nursing note reviewed.   Constitutional:       General: He is not in acute distress.     Appearance: Normal appearance. He is obese. He is ill-appearing.   HENT:      Head: Normocephalic and atraumatic.   Eyes:      General: No scleral icterus.        Right eye: No discharge.         Left eye: No discharge.   Cardiovascular:      Rate and Rhythm: Normal rate and regular rhythm.      Heart sounds: Normal heart sounds.   Pulmonary:      Effort: No respiratory distress.      Breath sounds: Normal breath sounds. No wheezing or rales.   Abdominal:      General: Abdomen is flat. Bowel sounds are normal. There is no distension.      Tenderness: There is no abdominal tenderness.   Musculoskeletal:         General: Tenderness (Left foot tenderness) present.   Neurological:      Mental Status: He is alert.         Laboratory:  Recent Labs     04/17/22  1702   WBC 15.9*   RBC 5.69   HEMOGLOBIN 17.0   HEMATOCRIT 50.5   MCV 88.8   MCH 29.9   MCHC 33.7   RDW 41.6   PLATELETCT 229   MPV 11.1     Recent Labs     04/17/22  1702   SODIUM 138   POTASSIUM 3.9   CHLORIDE 101   CO2 21   GLUCOSE 109*   BUN 22   CREATININE 1.18   CALCIUM 9.9     Recent Labs     04/17/22  1702   ALTSGPT 53*   ASTSGOT 49*   ALKPHOSPHAT 154*   TBILIRUBIN 1.3   GLUCOSE 109*         No results for input(s): NTPROBNP in the last 72 hours.      No results for input(s): TROPONINT in the last 72 hours.    Imaging:  DX-CHEST-PORTABLE (1 VIEW)   Final Result         1. No acute cardiopulmonary abnormalities are identified.      US-RUQ    (Results Pending)       X-Ray:  I have personally  reviewed the images and compared with prior images. and My impression is: no acute cardiopulmonary processes    Assessment/Plan:  I anticipate this patient will require at least two midnights for appropriate medical management, necessitating inpatient admission.    * Sepsis (HCC)- (present on admission)  Assessment & Plan  This is Sepsis Present on admission  SIRS criteria identified on my evaluation include: Fever, with temperature greater than 101 deg F and Leukocytosis, with WBC greater than 12,000  Source is leg cellulitis  Sepsis protocol initiated  Fluid resuscitation ordered per protocol  Crystalloid Fluid Administration: Fluid resuscitation ordered per standard protocol - 30 mL/kg per current or ideal body weight  IV antibiotics as appropriate for source of sepsis  Reassessment: I have reassessed the patient's hemodynamic status  covid neg, UA neg  Blood culture pending  Vanco and Unasyn        Cellulitis of left foot  Assessment & Plan  Patient presenting with left foot cellulitis.  Patient has significant tenderness to his left foot.  He denies any recent trauma or pedicures.  He reports he has cracks in his feet that are very painful.  Likely the source of the patients sepsis  Vancomycin and Unasyn    Transaminitis  Assessment & Plan  AST 49, ALT 53, Alk P 154, T bili 1.3.  Possibly due to fatty liver  Hepatitis panel ordered  Daily CMP  Right upper quadrant ultrasound    Proteinuria  Assessment & Plan  Proteinuria noted in the patient's urinalysis.  May be secondary to uncontrolled hypertension.  Patient appears to have a mild KIERA since his creatinine is above his baseline.  Follow-up as an outpatient.    Essential hypertension- (present on admission)  Assessment & Plan  Continue home medication hydrochlorothiazide and losartan.  As needed labetalol    Bipolar disorder, current episode mixed, moderate (HCC)- (present on admission)  Assessment & Plan  Continue home medications.    Morbid obesity (HCC)-  (present on admission)  Assessment & Plan  BMI 54.73.  He reports that prior to his surgery he weighed over 500 pounds.  Patient reports that he had a weight loss surgery performed in October 2021 with no complications.  Diet, exercise and weight loss    Central sleep apnea- (present on admission)  Assessment & Plan  Likely due to morbid obesity      VTE prophylaxis: enoxaparin ppx

## 2022-04-18 NOTE — ASSESSMENT & PLAN NOTE
AST 49, ALT 53, Alk P 154, T bili 1.3.  Possibly due to fatty liver  Hepatitis panel ordered  Daily CMP  Right upper quadrant ultrasound

## 2022-04-18 NOTE — ED NOTES
Med rec updated and complete. Allergies reviewed . Confirmed  Name and date of birth.  Pt has filled at [facility] in the past.  However, pt reports that he will need to fill at [facility] .  No antibiotic use in last 30 days.      Home pharmacy [facility] [phone]

## 2022-04-18 NOTE — ASSESSMENT & PLAN NOTE
Patient presenting with left foot cellulitis.  Patient has significant tenderness to his left foot.  He denies any recent trauma or pedicures.  He reports he has cracks in his feet that are very painful.  Likely the source of the patient's sepsis.  Discontinued vancomycin per antibiotic stewardship team recommendation, appreciate recs.  Continue Unasyn.  Wound care consulted, appreciate assistance.  Consider imaging (likely w/o contrast due to renal function) and/or ortho consult if no improvement.

## 2022-04-18 NOTE — ASSESSMENT & PLAN NOTE
This is Sepsis Present on admission  SIRS criteria identified on my evaluation include: Fever, with temperature greater than 101 deg F and Leukocytosis, with WBC greater than 12,000  Source is leg cellulitis  End organ dysfunction as evidenced by acute renal failure without other suspected cause  Sepsis protocol initiated  Fluid resuscitation ordered per protocol  Crystalloid Fluid Administration: Fluid resuscitation ordered per standard protocol - 30 mL/kg per current or ideal body weight  IV antibiotics as appropriate for source of sepsis  Reassessment: I have reassessed the patient's hemodynamic status  covid neg, UA neg  Blood culture 1/2 bottles with GPCs. Follow up second bottle and speciation.  Continue Unasyn    4/20 group A strep bacteremia, repeat cultures are negative  Improving leukocytosis  On IV Unasyn, ID consult for further antibiotic recommendation  Echocardiogram

## 2022-04-18 NOTE — ASSESSMENT & PLAN NOTE
Proteinuria noted in the patient's urinalysis. Patient also has KIERA.  Consider Nephrology assistance if no improvement with treatment for sepsis.

## 2022-04-18 NOTE — PROGRESS NOTES
Patient arrived on unit at 1315 and ambulated to bed, steady gait. Patient is alert and oriented, very pleasant, and reports pain only with ambulation, declines interventions at this time. Patient hooked up to fluids, vitals stable, admit profile completed. Belongings include clothes and wallet, declined safekeeping. Educated patient how to use room phone per request and pt called contacts about admission. Provided with meal and warm blankets per request. No other needs at this time.

## 2022-04-18 NOTE — HOSPITAL COURSE
Adama Burt III is a 26 y.o. male who presented 4/17/2022 with bipolar, morbid obesity, apnea who presents with left foot pain.  Patient reports that this afternoon he started noticing fevers and chills and malaise.  He reports has had some left foot pain with hyperkeratosis of both feet with deep cracks in the skin.  He reports he typically has a pedicure done every 2 weeks but has not had one in a few weeks.  He denies any trauma to his foot.  He reports that the pain today has been excruciating and he developed some small amount of erythema.  He reports that at home his fever was 106 and he decided to be evaluated in the ED.  He denies any sick contacts.  He has been vaccinated for COVID.

## 2022-04-18 NOTE — ASSESSMENT & PLAN NOTE
Cr increased to 1.55 from baseline 0.8.  Vancomycin discontinued.  Continue IVF.  Continue treatment for sepsis as noted.  Avoid NSAIDs and other nephrotoxic medications.  Monitor daily.  Consider Nephrology assistance if no improvement with treatment for sepsis.    4/19 CR improving, cont ivf  Renal  Us, mild right hydro, monitor

## 2022-04-18 NOTE — CARE PLAN
Problem: Knowledge Deficit - Standard  Goal: Patient and family/care givers will demonstrate understanding of plan of care, disease process/condition, diagnostic tests and medications  Outcome: Progressing     Problem: Respiratory  Goal: Patient will achieve/maintain optimum respiratory ventilation and gas exchange  Outcome: Progressing  Note: Patient requires oxygen at night only     The patient is Watcher - Medium risk of patient condition declining or worsening    Shift Goals  Clinical Goals: abx therapy  Patient Goals: get better    Progress made toward(s) clinical / shift goals: abx administered per MAR, vitals being monitored, medication for fever given per MAR    Patient is not progressing towards the following goals:      Problem: Hemodynamics  Goal: Patient's hemodynamics, fluid balance and neurologic status will be stable or improve  Outcome: Not Progressing  Note: Patient continues to have fluctuations in blood pressure, heart rate, and temperature

## 2022-04-18 NOTE — PROGRESS NOTES
4 Eyes Skin Assessment Completed by MRALENE Manrique and MARLENE King.    Head WDL  Ears WDL  Nose WDL  Mouth WDL  Neck WDL  Breast/Chest WDL  Shoulder Blades WDL  Spine WDL  (R) Arm/Elbow/Hand Bruising  (L) Arm/Elbow/Hand Bruising  Abdomen Bruising  Groin Redness and Blanching  Scrotum/Coccyx/Buttocks Redness and Blanching  (R) Leg Bruising  (L) Leg Bruising  (R) Heel/Foot/Toe Boggy  (L) Heel/Foot/Toe Redness, Boggy and Swelling      Devices In Places Blood Pressure Cuff      Interventions In Place NC W/Ear Foams, Pillows and Pressure Redistribution Mattress    Possible Skin Injury No    Pictures Uploaded Into Epic N/A  Wound Consult Placed N/A  RN Wound Prevention Protocol Ordered No      Patient has redness and swelling where cellulitis on L toe is. Left toes are red and swollen, small black scab on plantar area of left foot. Left heels are dry and cracked, dry fissures present. MD aware and being treated with abx.

## 2022-04-18 NOTE — ASSESSMENT & PLAN NOTE
BMI 54.73.  He reports that prior to his surgery he weighed over 500 pounds.  Patient reports that he had a weight loss surgery performed in October 2021 with no complications.  Diet, exercise and weight loss

## 2022-04-18 NOTE — PROGRESS NOTES
Pharmacy Vancomycin Kinetics Note for 4/17/2022   26 y.o. male on Vancomycin day # 1     Vancomycin Indication (AUC Dosing): Skin/skin structure infection  Provider specified end date: 04/27/22    Active Antibiotics (From admission, onward)    Ordered     Ordering Provider       Sun Apr 17, 2022  7:41 PM    04/17/22 1941  vancomycin (VANCOCIN) 3 g in  mL IVPB  (vancomycin (VANCOCIN) IV (LD + Maintenance))  ONCE         NAVID Shore Apr 17, 2022  7:28 PM    04/17/22 1928  ampicillin/sulbactam (UNASYN) 3 g in  mL IVPB  (Abscess/Cellulitis )  EVERY 6 HOURS         Alvarez Reeves D.O.    04/17/22 1928  MD Alert...Vancomycin per Pharmacy  (Abscess/Cellulitis )  PHARMACY TO DOSE        Question:  Indication(s) for vancomycin?  Answer:  Skin and soft tissue infection    Alvarez Reeves D.O.        Dosing Weight: 178 kg (392 lb 6.7 oz)    Admission History: Admitted on 4/17/2022 for Sepsis (HCC) [A41.9]  Pertinent history: Patient presented to the ER for c/o left foot pain. Patient has hyperketosis of bilateral feet with deep skin cracks noted on examination. There is a small area of erythema to the medial aspect of the left foot. Tmax in the ER was 104.1F with persistent tachycardia despite IVF resuscitation. Leukocytosis and lactic acidosis noted on initial lab studies. Vancomycin and Unasyn are ordered on admission.    Allergies:   Patient has no known allergies.     Pertinent cultures to date:   Results     Procedure Component Value Units Date/Time    MRSA By PCR (Amp) [[id number]]     Order Status: Sent Specimen: Respirate from Nares     CoV-2, FLU A/B, and RSV by PCR (2-4 Hours CEPHEID) : Collect NP swab in VTM [[id number]] Collected: 04/17/22 1702    Order Status: Completed Specimen: Respirate Updated: 04/17/22 1805     Influenza virus A RNA Negative     Influenza virus B, PCR Negative     RSV, PCR Negative     SARS-CoV-2 by PCR NotDetected     Comment: PATIENTS: Important information  "regarding your results and instructions can  be found at [email or web address]   \"After your  Covid-19 Test\"    [facility] providers: PLEASE REFER TO DE-ESCALATION AND RETESTING PROTOCOL  on [email or web address]    **The [employer] GeneXpert Xpress SARS-CoV-2 RT-PCR Test has been made  available for use under the Emergency Use Authorization (EUA) only.          SARS-CoV-2 Source NP Swab    Urinalysis [[id number]]  (Abnormal) Collected: 22    Order Status: Completed Specimen: Respirate Updated: 22     Color Yellow     Character Clear     Specific Gravity 1.017     Ph 6.5     Glucose Negative mg/dL      Ketones 15 mg/dL      Protein 100 mg/dL      Bilirubin Negative     Urobilinogen, Urine 1.0     Nitrite Negative     Leukocyte Esterase Negative     Occult Blood Negative     Micro Urine Req Microscopic    Blood Culture [[id number]] Collected: 22    Order Status: Sent Specimen: Blood from Peripheral Updated: 22    Narrative:      2 of 2 blood culture x2  Sites order. Per Hospital Policy:  Only change Specimen Src: to \"Line\" if specified by physician  order.    Blood Culture [[id number]] Collected: 22    Order Status: Sent Specimen: Blood from Peripheral Updated: 22    Narrative:      1 of 2 for Blood Culture x 2 sites order. Per Hospital  Policy: Only change Specimen Src: to \"Line\" if specified by  physician order.        Labs:   Estimated Creatinine Clearance: 152.9 mL/min (by C-G formula based on SCr of 1.18 mg/dL).  Recent Labs     22   WBC 15.9*   NEUTSPOLYS 92.20*     Recent Labs     22   BUN 22   CREATININE 1.18   ALBUMIN 4.8     No intake or output data in the 24 hours ending 22   /56   Pulse 95   Temp 37.7 °C (99.8 °F) (Oral)   Resp (!) 25   Ht 1.803 m (5' 11\")   Wt (!) 178 kg (392 lb 6.7 oz)   SpO2 92%  Temp (24hrs), Av °C (102.2 °F), Min:37.7 °C (99.8 °F), Max:40.1 °C (104.1 " °F)    List concerns for Vancomycin clearance:   Obesity;Abnormal LFTs    Pharmacokinetics:   AUC kinetics:   Ke (hr ^-1): 0.1314 hr^-1  Half life: 5.28 hr  Clearance: 8.141  Estimated TDD: 4070.5  Estimated Dose: 1238  Estimated interval: 7.3    A/P:   -  Vancomycin dose: initiate 2000 mg IV q12h  -  Next vancomycin level(s): TBD if vancomycin continues for 2-3 days   -  Predicted vancomycin AUC from initial AUC test calculator: 491 mg·hr/L  -  Comments: patient is at risk for drug accumulation and associated nephrotoxicity primarily due to body habitus. MRSA nares ordered to guide de-escalation efforts. Will initiate q12h dosing regimen, would recommend checking steady state peak and trough levels to adjust dosing if vancomycin tx continues for multiple days. Pharmacy will continue to monitor and adjust dosing or recommend de-escalation as appropriate.       Zora Ahuja, PharmD

## 2022-04-18 NOTE — ASSESSMENT & PLAN NOTE
BP low to normal.  Hold home hydrochlorothiazide and losartan in the setting of sepsis.  As needed labetalol

## 2022-04-19 ENCOUNTER — PATIENT OUTREACH (OUTPATIENT)
Dept: HEALTH INFORMATION MANAGEMENT | Facility: OTHER | Age: 27
End: 2022-04-19
Payer: MEDICAID

## 2022-04-19 LAB
ANION GAP SERPL CALC-SCNC: 8 MMOL/L (ref 7–16)
BASOPHILS # BLD AUTO: 0.5 % (ref 0–1.8)
BASOPHILS # BLD: 0.07 K/UL (ref 0–0.12)
BUN SERPL-MCNC: 21 MG/DL (ref 8–22)
CALCIUM SERPL-MCNC: 8.8 MG/DL (ref 8.5–10.5)
CHLORIDE SERPL-SCNC: 103 MMOL/L (ref 96–112)
CO2 SERPL-SCNC: 25 MMOL/L (ref 20–33)
CREAT SERPL-MCNC: 1.16 MG/DL (ref 0.5–1.4)
EOSINOPHIL # BLD AUTO: 0.03 K/UL (ref 0–0.51)
EOSINOPHIL NFR BLD: 0.2 % (ref 0–6.9)
ERYTHROCYTE [DISTWIDTH] IN BLOOD BY AUTOMATED COUNT: 46.5 FL (ref 35.9–50)
GFR SERPLBLD CREATININE-BSD FMLA CKD-EPI: 88 ML/MIN/1.73 M 2
GLUCOSE SERPL-MCNC: 99 MG/DL (ref 65–99)
HCT VFR BLD AUTO: 40.5 % (ref 42–52)
HGB BLD-MCNC: 13.3 G/DL (ref 14–18)
IMM GRANULOCYTES # BLD AUTO: 0.08 K/UL (ref 0–0.11)
IMM GRANULOCYTES NFR BLD AUTO: 0.6 % (ref 0–0.9)
LACTATE BLD-SCNC: 1.7 MMOL/L (ref 0.5–2)
LYMPHOCYTES # BLD AUTO: 1.35 K/UL (ref 1–4.8)
LYMPHOCYTES NFR BLD: 9.9 % (ref 22–41)
MCH RBC QN AUTO: 30 PG (ref 27–33)
MCHC RBC AUTO-ENTMCNC: 32.8 G/DL (ref 33.7–35.3)
MCV RBC AUTO: 91.2 FL (ref 81.4–97.8)
MONOCYTES # BLD AUTO: 0.93 K/UL (ref 0–0.85)
MONOCYTES NFR BLD AUTO: 6.8 % (ref 0–13.4)
NEUTROPHILS # BLD AUTO: 11.17 K/UL (ref 1.82–7.42)
NEUTROPHILS NFR BLD: 82 % (ref 44–72)
NRBC # BLD AUTO: 0 K/UL
NRBC BLD-RTO: 0 /100 WBC
PLATELET # BLD AUTO: 171 K/UL (ref 164–446)
PMV BLD AUTO: 11.4 FL (ref 9–12.9)
POTASSIUM SERPL-SCNC: 3.6 MMOL/L (ref 3.6–5.5)
RBC # BLD AUTO: 4.44 M/UL (ref 4.7–6.1)
SODIUM SERPL-SCNC: 136 MMOL/L (ref 135–145)
WBC # BLD AUTO: 13.6 K/UL (ref 4.8–10.8)

## 2022-04-19 PROCEDURE — 700105 HCHG RX REV CODE 258: Performed by: STUDENT IN AN ORGANIZED HEALTH CARE EDUCATION/TRAINING PROGRAM

## 2022-04-19 PROCEDURE — A9270 NON-COVERED ITEM OR SERVICE: HCPCS | Performed by: INTERNAL MEDICINE

## 2022-04-19 PROCEDURE — 700102 HCHG RX REV CODE 250 W/ 637 OVERRIDE(OP): Performed by: INTERNAL MEDICINE

## 2022-04-19 PROCEDURE — 99232 SBSQ HOSP IP/OBS MODERATE 35: CPT | Performed by: INTERNAL MEDICINE

## 2022-04-19 PROCEDURE — 87040 BLOOD CULTURE FOR BACTERIA: CPT | Mod: 91

## 2022-04-19 PROCEDURE — A9270 NON-COVERED ITEM OR SERVICE: HCPCS | Performed by: STUDENT IN AN ORGANIZED HEALTH CARE EDUCATION/TRAINING PROGRAM

## 2022-04-19 PROCEDURE — 80048 BASIC METABOLIC PNL TOTAL CA: CPT

## 2022-04-19 PROCEDURE — 700102 HCHG RX REV CODE 250 W/ 637 OVERRIDE(OP): Performed by: STUDENT IN AN ORGANIZED HEALTH CARE EDUCATION/TRAINING PROGRAM

## 2022-04-19 PROCEDURE — 97597 DBRDMT OPN WND 1ST 20 CM/<: CPT

## 2022-04-19 PROCEDURE — 700111 HCHG RX REV CODE 636 W/ 250 OVERRIDE (IP): Performed by: STUDENT IN AN ORGANIZED HEALTH CARE EDUCATION/TRAINING PROGRAM

## 2022-04-19 PROCEDURE — 83605 ASSAY OF LACTIC ACID: CPT

## 2022-04-19 PROCEDURE — 36415 COLL VENOUS BLD VENIPUNCTURE: CPT

## 2022-04-19 PROCEDURE — 85025 COMPLETE CBC W/AUTO DIFF WBC: CPT

## 2022-04-19 PROCEDURE — 770001 HCHG ROOM/CARE - MED/SURG/GYN PRIV*

## 2022-04-19 RX ORDER — AMMONIUM LACTATE 12 G/100G
LOTION TOPICAL 2 TIMES DAILY
Status: DISCONTINUED | OUTPATIENT
Start: 2022-04-19 | End: 2022-04-21 | Stop reason: HOSPADM

## 2022-04-19 RX ORDER — IBUPROFEN 600 MG/1
600 TABLET ORAL EVERY 6 HOURS PRN
Status: DISCONTINUED | OUTPATIENT
Start: 2022-04-19 | End: 2022-04-21 | Stop reason: HOSPADM

## 2022-04-19 RX ADMIN — SODIUM CHLORIDE 3 G: 900 INJECTION INTRAVENOUS at 12:26

## 2022-04-19 RX ADMIN — CARBAMAZEPINE 200 MG: 200 TABLET ORAL at 18:04

## 2022-04-19 RX ADMIN — ARIPIPRAZOLE 30 MG: 15 TABLET ORAL at 05:16

## 2022-04-19 RX ADMIN — SODIUM CHLORIDE 3 G: 900 INJECTION INTRAVENOUS at 18:04

## 2022-04-19 RX ADMIN — TOPIRAMATE 50 MG: 25 TABLET, FILM COATED ORAL at 18:04

## 2022-04-19 RX ADMIN — SODIUM CHLORIDE, POTASSIUM CHLORIDE, SODIUM LACTATE AND CALCIUM CHLORIDE: 600; 310; 30; 20 INJECTION, SOLUTION INTRAVENOUS at 08:44

## 2022-04-19 RX ADMIN — CARBAMAZEPINE 200 MG: 200 TABLET ORAL at 05:15

## 2022-04-19 RX ADMIN — Medication: at 18:00

## 2022-04-19 RX ADMIN — ACETAMINOPHEN 650 MG: 325 TABLET, FILM COATED ORAL at 05:15

## 2022-04-19 RX ADMIN — ENOXAPARIN SODIUM 40 MG: 40 INJECTION SUBCUTANEOUS at 05:15

## 2022-04-19 RX ADMIN — SODIUM CHLORIDE 3 G: 900 INJECTION INTRAVENOUS at 00:34

## 2022-04-19 RX ADMIN — TRAZODONE HYDROCHLORIDE 50 MG: 50 TABLET ORAL at 21:51

## 2022-04-19 RX ADMIN — ENOXAPARIN SODIUM 40 MG: 40 INJECTION SUBCUTANEOUS at 18:04

## 2022-04-19 RX ADMIN — SODIUM CHLORIDE 3 G: 900 INJECTION INTRAVENOUS at 05:12

## 2022-04-19 RX ADMIN — Medication: at 12:26

## 2022-04-19 ASSESSMENT — ENCOUNTER SYMPTOMS
DIARRHEA: 0
FEVER: 1
VOMITING: 0
CHILLS: 1
NAUSEA: 0
ABDOMINAL PAIN: 0
COUGH: 0
CONSTIPATION: 0
SHORTNESS OF BREATH: 0

## 2022-04-19 ASSESSMENT — PAIN DESCRIPTION - PAIN TYPE
TYPE: ACUTE PAIN
TYPE: ACUTE PAIN

## 2022-04-19 NOTE — WOUND TEAM
"[facility]  Inpatient Services  Initial Wound and Skin Care Evaluation    Admission Date: 4/17/2022     Last order of IP CONSULT TO WOUND CARE was found on 4/18/2022 from Hospital Encounter on 4/17/2022     HPI, PMH, SH: Reviewed    Past Surgical History:   Procedure Laterality Date   • VA LAP, CHRISS RESTRICT PROC, LONGITUDINAL GAS* N/A 10/29/2021    Procedure: GASTRECTOMY, SLEEVE, LAPAROSCOPIC;  Surgeon: Joni Quiles M.D.;  Location: SURGERY [facility];  Service: General   • TONSILLECTOMY  3/17/2010    Performed by ANKIT HERNANDEZ at SURGERY SAME DAY [facility] ORS   • APPENDECTOMY CHILD     • OTHER      wisdom teeth extraction      Social History     Tobacco Use   • Smoking status: Never Smoker   • Smokeless tobacco: Never Used   Substance Use Topics   • Alcohol use: No     Comment: rare      Chief Complaint   Patient presents with   • Foot Pain     L sided. \"dry skin cut\" on the bottom of pt's toe     • Anxiety     Diagnosis: Sepsis ([facility]) [A41.9]    Unit where seen by Wound Team: S531/02     WOUND CONSULT/FOLLOW UP RELATED TO:  L. Hallux/ foot     WOUND HISTORY:  Adama Burt III is a 26 y.o. male who presented 4/17/2022 with bipolar, morbid obesity, apnea who presents with left foot pain.  Patient reports that this afternoon he started noticing fevers and chills and malaise.  He reports has had some left foot pain with hyperkeratosis of both feet with deep cracks in the skin.  He reports he typically has a pedicure done every 2 weeks but has not had one in a few weeks.  He denies any trauma to his foot.  He reports that the pain today has been excruciating and he developed some small amount of erythema.  He reports that at home his fever was 106 and he decided to be evaluated in the ED.  He denies any sick contacts.  He has been vaccinated for COVID.       WOUND ASSESSMENT/LDA              Vascular:    RODRI:   No results found.    Lab Values:    Lab Results   Component Value " Date/Time    WBC 13.6 (H) 04/19/2022 05:24 AM    RBC 4.44 (L) 04/19/2022 05:24 AM    HEMOGLOBIN 13.3 (L) 04/19/2022 05:24 AM    HEMATOCRIT 40.5 (L) 04/19/2022 05:24 AM    CREACTPROT 4.24 (H) 04/17/2022 05:02 PM    SEDRATEWES 3 04/17/2022 05:02 PM    HBA1C 5.2 07/02/2021 12:49 PM        Culture Results show:  No results found for this or any previous visit (from the past 720 hour(s)).    Pain Level/Medicated:  Patient reported tenderness       INTERVENTIONS BY WOUND TEAM:  Chart and images reviewed. Discussed with bedside RN. All areas of concern (based on picture review, LDA review and discussion with bedside RN) have been thoroughly assessed. Documentation of areas based on significant findings. This RN in to assess patient. Performed standard wound care which includes appropriate positioning, dressing removal and non-selective debridement. Pictures and measurements obtained weekly if/when required.  Preparation for Dressing removal: Dressing soaked with n/a  Non-selectively Debrided with:  soap and water and gauze.  Sharp debridement: Calleous and non viable tissue debrided away using currete forceps and scissors.  <20 cm2 debrided. no bleeding noted, controlled with manual pressure.  Manju wound: Cleansed with soap and water, Prepped with moisturizer  Primary Dressing: moisturizer  Secondary (Outer) Dressing: NIECY    Interdisciplinary consultation: Patient, Bedside RN (),     EVALUATION / RATIONALE FOR TREATMENT:  Most Recent Date:  4/19/22: patient's foot is very calloused with small purple spots where he tried to fix the problem at home.  Patient is on antibiotics, but no x-rays of foot.  Patient is not diabetic. Orders placed for moisture cream.       Goals: Steady decrease in wound area and depth weekly.    WOUND TEAM PLAN OF CARE ([X] for frequency of wound follow up,): X  Nursing to follow orders written for wound care. Contact wound team if area fails to progress, deteriorates or with any  questions/concerns  Dressing changes by wound team:                   Follow up 3 times weekly:                NPWT change 3 times weekly:     Follow up 1-2 times weekly:      Follow up Bi-Monthly:                   Follow up as needed:   X  Other (explain):     NURSING PLAN OF CARE ORDERS (X):  Dressing changes: See Dressing Care orders: X  Skin care: See Skin Care orders: X  RN Prevention Protocol: X  Rectal tube care: See Rectal Tube Care orders:   Other orders:    RSKIN:   CURRENTLY IN PLACE (X), APPLIED THIS VISIT (A), ORDERED (O):   Q shift Mal:  X  Q shift pressure point assessments:  X    Surface/Positioning X  Pressure redistribution mattress   X        Low Airloss          Bariatric foam      Bariatric NATHANAEL     Waffle cushion        Waffle Overlay          Reposition q 2 hours      TAPs Turning system     Z Dru Pillow     Offloading/Redistribution no interventions  Sacral Mepilex (Silicone dressing)     Heel Mepilex (Silicone dressing)         Heel float boots (Prevalon boot)             Float Heels off Bed with Pillows           Respiratory room air  Silicone O2 tubing         Gray Foam Ear protectors     Cannula fixation Device (Tender )          High flow offloading Clip    Elastic head band offloading device      Anchorfast                                                         Trach with Optifoam split foam             Containment/Moisture Prevention TIFFANIE    Rectal tube or BMS    Purwick/Condom Cath        Augustin Catheter    Barrier wipes           Barrier paste       Antifungal tx      Interdry        Mobilization TIFFANIE      Up to chair        Ambulate      PT/OT      Nutrition PO      Dietician        Diabetes Education      PO     TF     TPN     NPO   # days     Other        Anticipated discharge plans: TIFFANIE  LTACH:        SNF/Rehab:                  Home Health Care:           Outpatient Wound Center:            Self/Family Care:        Other:                  Vac Discharge Needs:   Not  Applicable Pt not on a wound vac:    X   Regular Vac while inpatient, alternative dressing at DC:        Regular Vac in use and continued at DC:            Reg. Vac w/ Skin Sub/Biologic in use. Will need to be changed 2x wkly:      Veraflo Vac while inpatient, ok to transition to Regular Vac on Discharge:           Veraflo Vac while inpatient, will need to remain on Veraflo Vac upon discharge:

## 2022-04-19 NOTE — PROGRESS NOTES
[facility] Medicine Daily Progress Note    Date of Service  4/19/2022    Chief Complaint  Adama Burt III is a 26 y.o. male admitted 4/17/2022 with left foot pain    Hospital Course  Adama Burt III is a 26 y.o. male who presented 4/17/2022 with bipolar, morbid obesity, apnea who presents with left foot pain.  Patient reports that this afternoon he started noticing fevers and chills and malaise.  He reports has had some left foot pain with hyperkeratosis of both feet with deep cracks in the skin.  He reports he typically has a pedicure done every 2 weeks but has not had one in a few weeks.  He denies any trauma to his foot.  He reports that the pain today has been excruciating and he developed some small amount of erythema.  He reports that at home his fever was 106 and he decided to be evaluated in the ED.  He denies any sick contacts.  He has been vaccinated for COVID.      Interval Problem Update  Improving wbc  Afebrile today, reports drowsy, easily arousable  Denies pain  Continue Unasyn.  Wound care consulted, appreciate assistance.    I have personally seen and examined the patient at bedside. I discussed the plan of care with patient and bedside RN.    Consultants/Specialty  none    Code Status  Full Code    Disposition  Patient is not medically cleared for discharge.   Anticipate discharge to to home with close outpatient follow-up.  I have placed the appropriate orders for post-discharge needs.    Review of Systems  Review of Systems   Constitutional: Positive for chills and fever.   Respiratory: Negative for cough and shortness of breath.    Cardiovascular: Negative for chest pain.   Gastrointestinal: Negative for abdominal pain, constipation, diarrhea, nausea and vomiting.   Genitourinary: Negative for dysuria.   Musculoskeletal:        Left foot pain   All other systems reviewed and are negative.       Physical Exam  Temp:  [36.9 °C (98.4 °F)-39.2 °C (102.5 °F)] 36.9 °C (98.4  °F)  Pulse:  [85-96] 85  Resp:  [17-20] 18  BP: ()/(51-64) 122/53  SpO2:  [94 %-97 %] 94 %    Physical Exam  Vitals and nursing note reviewed.   Constitutional:       General: He is not in acute distress.     Appearance: Normal appearance. He is obese. He is ill-appearing.   HENT:      Head: Normocephalic and atraumatic.   Eyes:      General: No scleral icterus.        Right eye: No discharge.         Left eye: No discharge.   Cardiovascular:      Rate and Rhythm: Normal rate and regular rhythm.      Heart sounds: Normal heart sounds.   Pulmonary:      Effort: No respiratory distress.      Breath sounds: Normal breath sounds. No wheezing or rales.   Abdominal:      General: Abdomen is flat. Bowel sounds are normal. There is no distension.      Tenderness: There is no abdominal tenderness.   Musculoskeletal:         General: Tenderness (Left foot tenderness) present.      Left lower leg: Edema present.      Comments: Left foot with ulceration between first and second toe. Erythema present, extending from foot to mid-calf.   Neurological:      General: No focal deficit present.      Mental Status: He is alert and oriented to person, place, and time.   Psychiatric:         Attention and Perception: Attention normal.         Speech: Speech normal.         Behavior: Behavior is cooperative.         Judgment: Judgment is not impulsive.         Fluids    Intake/Output Summary (Last 24 hours) at 4/19/2022 1324  Last data filed at 4/19/2022 0745  Gross per 24 hour   Intake 720 ml   Output --   Net 720 ml       Laboratory  Recent Labs     04/17/22  1702 04/18/22  0133 04/19/22  0524   WBC 15.9* 26.9* 13.6*   RBC 5.69 5.24 4.44*   HEMOGLOBIN 17.0 15.7 13.3*   HEMATOCRIT 50.5 46.3 40.5*   MCV 88.8 88.4 91.2   MCH 29.9 30.0 30.0   MCHC 33.7 33.9 32.8*   RDW 41.6 42.5 46.5   PLATELETCT 229 210 171   MPV 11.1 11.2 11.4     Recent Labs     04/17/22  1702 04/18/22  0133 04/19/22  0524   SODIUM 138 136 136   POTASSIUM 3.9 4.0  3.6   CHLORIDE 101 99 103   CO2 21 24 25   GLUCOSE 109* 117* 99   BUN 22 29* 21   CREATININE 1.18 1.55* 1.16   CALCIUM 9.9 9.4 8.8                   Imaging  US-RUQ   Final Result      Possible mild RIGHT hydronephrosis      DX-CHEST-PORTABLE (1 VIEW)   Final Result         1. No acute cardiopulmonary abnormalities are identified.           Assessment/Plan  * Sepsis due to Streptococcus species with acute renal failure (HCC)- (present on admission)  Assessment & Plan  This is Sepsis Present on admission  SIRS criteria identified on my evaluation include: Fever, with temperature greater than 101 deg F and Leukocytosis, with WBC greater than 12,000  Source is leg cellulitis  End organ dysfunction as evidenced by acute renal failure without other suspected cause  Sepsis protocol initiated  Fluid resuscitation ordered per protocol  Crystalloid Fluid Administration: Fluid resuscitation ordered per standard protocol - 30 mL/kg per current or ideal body weight  IV antibiotics as appropriate for source of sepsis  Reassessment: I have reassessed the patient's hemodynamic status  covid neg, UA neg  Blood culture 1/2 bottles with GPCs. Follow up second bottle and speciation.  Continue Unasyn    KIERA (acute kidney injury) (HCC)- (present on admission)  Assessment & Plan  Cr increased to 1.55 from baseline 0.8.  Vancomycin discontinued.  Continue IVF.  Continue treatment for sepsis as noted.  Avoid NSAIDs and other nephrotoxic medications.  Monitor daily.  Consider Nephrology assistance if no improvement with treatment for sepsis.    4/19 CR improving, cont ivf  Renal  Us, mild right hydro, monitor    Cellulitis of left foot- (present on admission)  Assessment & Plan  Patient presenting with left foot cellulitis.  Patient has significant tenderness to his left foot.  He denies any recent trauma or pedicures.  He reports he has cracks in his feet that are very painful.  Likely the source of the patient's sepsis.  Discontinued  vancomycin per antibiotic stewardship team recommendation, appreciate recs.  Continue Unasyn.  Wound care consulted, appreciate assistance.  Consider imaging (likely w/o contrast due to renal function) and/or ortho consult if no improvement.    Transaminitis- (present on admission)  Assessment & Plan  AST 49, ALT 53, Alk P 154, T bili 1.3.  Possibly due to fatty liver  Hepatitis panel ordered  Daily CMP  Right upper quadrant ultrasound    Proteinuria- (present on admission)  Assessment & Plan  Proteinuria noted in the patient's urinalysis. Patient also has KIERA.  Consider Nephrology assistance if no improvement with treatment for sepsis.    Essential hypertension- (present on admission)  Assessment & Plan  BP low to normal.  Hold home hydrochlorothiazide and losartan in the setting of sepsis.  As needed labetalol    Bipolar disorder, current episode mixed, moderate (HCC)- (present on admission)  Assessment & Plan  Continue home medications.    Morbid obesity (HCC)- (present on admission)  Assessment & Plan  BMI 54.73.  He reports that prior to his surgery he weighed over 500 pounds.  Patient reports that he had a weight loss surgery performed in October 2021 with no complications.  Diet, exercise and weight loss    Central sleep apnea- (present on admission)  Assessment & Plan  Likely due to morbid obesity       VTE prophylaxis: SCDs/TEDs and enoxaparin ppx    I have performed a physical exam and reviewed and updated ROS and Plan today (4/19/2022). In review of yesterday's note (4/18/2022), there are no changes except as documented above.

## 2022-04-19 NOTE — PROGRESS NOTES
JUDY Matt met with patient bedside and introduced Community Care Management and completed SDOH. Patient lives alone in an apartment and denies any barriers with rent, food and transportation. Patient is confident in managing his health and medications. Patient relies on [employer] [facility] [employer] [facility] for all transportation needs. Patient receives SNAP benefits. Patient does use any medical equipment. Patient is current with PCP Stoney Meneses and agreed for CHW to schedule a hospital follow up .   Community Health Worker Intake  • Social determinates of health intake : Completed   • Identified barriers to : None  • Contact information provided to Adama Nairador Carmel III : Yes bedside  • Has PCP appointment scheduled for : 04/25/22 @ 9am  • Scheduled Food Delivery/Home Visit/Outpatient Visit: : No  • Accepted/Declined Meds-To-Beds. Yes   • Inpatient assessment completed.    Plan:  JUDY Matt called and scheduled an appointment with Stoney Meneses 04/25/22 @ 9am and provided all information in follow up for [facility] and updated PCP in patients chart. CHW provided two bus passes and CHW contact information and will follow up post discharge.

## 2022-04-19 NOTE — DOCUMENTATION QUERY
[facility]                                                                       Query Response Note      PATIENT:               WILEY BENTON  ACCT #:                  [id number]  MRN:                     [id number]  :                      1995  ADMIT DATE:       2022 4:26 PM  DISCH DATE:          RESPONDING  PROVIDER #:        [id number]           QUERY TEXT:    27 y/o male presented with diagnosis of sepsis from L foot cellulitis along with KIERA. H&P documentation states KIERA may be due to uncontrolled HTN.  Admit /57 and was down to 116/62 in 1 hr with no treatment for HTN so far during this encounter. BP range since admission (91/60 - 145/70). Please clarify the relationship, if any, between KIERA and sepsis.    NOTE: If an appropriate response is not listed below, please respond with a new note.      The patient's Clinical Indicators include:  Admit vitals: T 100.6f increasing to 104.1f 1 hr later, , RR 22  Admit labs: WBC 15.9, Cr 1.55 (baseline 0.76 on 3/7)    H&P -  mild KIERA since his creatinine is above his baseline;  May be secondary to uncontrolled hypertension; Dx Sepsis, KIERA    Treatment - IV Vancomycin, Unasyn, Ancef; IV LR bolus & infusion    Risk factors - Sepsis, KIERA, HTN    Thank you,  Caitlyn Narvaez BSN  Clinical   Connect via [employer]  Options provided:   -- Organ dysfunction/failure of KIERA is due to or associated with sepsis   -- Unrelated to each other   -- Other explanation   -- Unable to determine      Query created by: Caitlyn Narvaez on 2022 8:07 AM    RESPONSE TEXT:    Unable to determine          Electronically signed by:  LYLE ASL MD 2022 9:11 PM

## 2022-04-19 NOTE — PROGRESS NOTES
Assumed care at 0700. Received report from NOC shift RN. Pt is awake and alert in bed, A&Ox 4, on RA, reports a pain level of 5/10, declining intervention. Fall precautions and appropriate signs in place. Call light and belongings within reach. Hourly rounding in place. Communication board updated. Pt denies any additional needs at this time.

## 2022-04-19 NOTE — PROGRESS NOTES
Assumed care of patient at 1900 from Burton ROSARIO. Patient is A&O x4, states pain level is 0/10. Bed locked in lowest position with 2 rail up. Call light  in place, belongings at bedside. Pt tempeture is 102.3 f, medicated patient per MAR, Gave patient ice packs for comfort. Hourly rounding is in place. Will continue to monitor.

## 2022-04-19 NOTE — DIETARY
NUTRITION SERVICES: BMI - Pt with BMI >40 (=Body mass index is 54.73 kg/m².), Class III obesity. Weight loss counseling not appropriate in acute care setting. RECOMMEND - If appropriate at DC please refer to outpatient nutrition services for weight management.

## 2022-04-19 NOTE — CARE PLAN
Problem: Knowledge Deficit - Standard  Goal: Patient and family/care givers will demonstrate understanding of plan of care, disease process/condition, diagnostic tests and medications  Outcome: Progressing     Problem: Hemodynamics  Goal: Patient's hemodynamics, fluid balance and neurologic status will be stable or improve  Outcome: Progressing   The patient is Stable - Low risk of patient condition declining or worsening    Shift Goals  Clinical Goals: monitor fever, abx therapy  Patient Goals: watch the [facility] game    Progress made toward(s) clinical / shift goals:  Patient is receiving abx therapy. Patient temperature WNL.     Patient is not progressing towards the following goals:

## 2022-04-19 NOTE — CARE PLAN
Problem: Knowledge Deficit - Standard  Goal: Patient and family/care givers will demonstrate understanding of plan of care, disease process/condition, diagnostic tests and medications  Outcome: Progressing     Problem: Pain - Standard  Goal: Alleviation of pain or a reduction in pain to the patient’s comfort goal  Outcome: Progressing   The patient is Stable - Low risk of patient condition declining or worsening    Shift Goals  Clinical Goals: monitor for fever  Patient Goals: Rest  Family Goals: N/A    Progress made toward(s) clinical / shift goals:  Pt educated on POC, agreeable to plan. Pt reporting 5/10 pain but declining intervention at this time, will reassess pain throughout shift.     Patient is not progressing towards the following goals:

## 2022-04-20 ENCOUNTER — APPOINTMENT (OUTPATIENT)
Dept: CARDIOLOGY | Facility: MEDICAL CENTER | Age: 27
DRG: 872 | End: 2022-04-20
Attending: INTERNAL MEDICINE
Payer: MEDICAID

## 2022-04-20 LAB
ANION GAP SERPL CALC-SCNC: 11 MMOL/L (ref 7–16)
BACTERIA BLD CULT: ABNORMAL
BACTERIA BLD CULT: ABNORMAL
BASOPHILS # BLD AUTO: 0.6 % (ref 0–1.8)
BASOPHILS # BLD: 0.06 K/UL (ref 0–0.12)
BUN SERPL-MCNC: 15 MG/DL (ref 8–22)
CALCIUM SERPL-MCNC: 8.8 MG/DL (ref 8.5–10.5)
CHLORIDE SERPL-SCNC: 102 MMOL/L (ref 96–112)
CO2 SERPL-SCNC: 23 MMOL/L (ref 20–33)
CREAT SERPL-MCNC: 0.9 MG/DL (ref 0.5–1.4)
EOSINOPHIL # BLD AUTO: 0.12 K/UL (ref 0–0.51)
EOSINOPHIL NFR BLD: 1.3 % (ref 0–6.9)
ERYTHROCYTE [DISTWIDTH] IN BLOOD BY AUTOMATED COUNT: 45 FL (ref 35.9–50)
ETEST SENSITIVITY ETEST: NORMAL
GFR SERPLBLD CREATININE-BSD FMLA CKD-EPI: 119 ML/MIN/1.73 M 2
GLUCOSE SERPL-MCNC: 98 MG/DL (ref 65–99)
HCT VFR BLD AUTO: 36.6 % (ref 42–52)
HGB BLD-MCNC: 12.4 G/DL (ref 14–18)
IMM GRANULOCYTES # BLD AUTO: 0.03 K/UL (ref 0–0.11)
IMM GRANULOCYTES NFR BLD AUTO: 0.3 % (ref 0–0.9)
LV EJECT FRACT  99904: 55
LV EJECT FRACT MOD 2C 99903: 63.43
LV EJECT FRACT MOD 4C 99902: 63.78
LV EJECT FRACT MOD BP 99901: 62.79
LYMPHOCYTES # BLD AUTO: 1.82 K/UL (ref 1–4.8)
LYMPHOCYTES NFR BLD: 19.5 % (ref 22–41)
MCH RBC QN AUTO: 30.2 PG (ref 27–33)
MCHC RBC AUTO-ENTMCNC: 33.9 G/DL (ref 33.7–35.3)
MCV RBC AUTO: 89.1 FL (ref 81.4–97.8)
MONOCYTES # BLD AUTO: 0.83 K/UL (ref 0–0.85)
MONOCYTES NFR BLD AUTO: 8.9 % (ref 0–13.4)
NEUTROPHILS # BLD AUTO: 6.48 K/UL (ref 1.82–7.42)
NEUTROPHILS NFR BLD: 69.4 % (ref 44–72)
NRBC # BLD AUTO: 0 K/UL
NRBC BLD-RTO: 0 /100 WBC
PLATELET # BLD AUTO: 153 K/UL (ref 164–446)
PMV BLD AUTO: 11 FL (ref 9–12.9)
POTASSIUM SERPL-SCNC: 3.6 MMOL/L (ref 3.6–5.5)
RBC # BLD AUTO: 4.11 M/UL (ref 4.7–6.1)
SIGNIFICANT IND 70042: ABNORMAL
SITE SITE: ABNORMAL
SODIUM SERPL-SCNC: 136 MMOL/L (ref 135–145)
SOURCE SOURCE: ABNORMAL
WBC # BLD AUTO: 9.3 K/UL (ref 4.8–10.8)

## 2022-04-20 PROCEDURE — 700105 HCHG RX REV CODE 258: Performed by: STUDENT IN AN ORGANIZED HEALTH CARE EDUCATION/TRAINING PROGRAM

## 2022-04-20 PROCEDURE — 99255 IP/OBS CONSLTJ NEW/EST HI 80: CPT | Performed by: INTERNAL MEDICINE

## 2022-04-20 PROCEDURE — A9270 NON-COVERED ITEM OR SERVICE: HCPCS | Performed by: STUDENT IN AN ORGANIZED HEALTH CARE EDUCATION/TRAINING PROGRAM

## 2022-04-20 PROCEDURE — 770001 HCHG ROOM/CARE - MED/SURG/GYN PRIV*

## 2022-04-20 PROCEDURE — 700111 HCHG RX REV CODE 636 W/ 250 OVERRIDE (IP): Performed by: INTERNAL MEDICINE

## 2022-04-20 PROCEDURE — 93306 TTE W/DOPPLER COMPLETE: CPT | Mod: 26 | Performed by: INTERNAL MEDICINE

## 2022-04-20 PROCEDURE — 85025 COMPLETE CBC W/AUTO DIFF WBC: CPT

## 2022-04-20 PROCEDURE — 700111 HCHG RX REV CODE 636 W/ 250 OVERRIDE (IP): Performed by: STUDENT IN AN ORGANIZED HEALTH CARE EDUCATION/TRAINING PROGRAM

## 2022-04-20 PROCEDURE — 93306 TTE W/DOPPLER COMPLETE: CPT

## 2022-04-20 PROCEDURE — 700117 HCHG RX CONTRAST REV CODE 255: Performed by: INTERNAL MEDICINE

## 2022-04-20 PROCEDURE — 80048 BASIC METABOLIC PNL TOTAL CA: CPT

## 2022-04-20 PROCEDURE — 700102 HCHG RX REV CODE 250 W/ 637 OVERRIDE(OP): Performed by: STUDENT IN AN ORGANIZED HEALTH CARE EDUCATION/TRAINING PROGRAM

## 2022-04-20 PROCEDURE — 700101 HCHG RX REV CODE 250: Performed by: INTERNAL MEDICINE

## 2022-04-20 PROCEDURE — 700102 HCHG RX REV CODE 250 W/ 637 OVERRIDE(OP): Performed by: INTERNAL MEDICINE

## 2022-04-20 PROCEDURE — A9270 NON-COVERED ITEM OR SERVICE: HCPCS | Performed by: INTERNAL MEDICINE

## 2022-04-20 PROCEDURE — 99232 SBSQ HOSP IP/OBS MODERATE 35: CPT | Performed by: INTERNAL MEDICINE

## 2022-04-20 RX ADMIN — SODIUM CHLORIDE 3 G: 900 INJECTION INTRAVENOUS at 05:03

## 2022-04-20 RX ADMIN — CARBAMAZEPINE 200 MG: 200 TABLET ORAL at 05:02

## 2022-04-20 RX ADMIN — IBUPROFEN 600 MG: 600 TABLET ORAL at 05:02

## 2022-04-20 RX ADMIN — SODIUM CHLORIDE, POTASSIUM CHLORIDE, SODIUM LACTATE AND CALCIUM CHLORIDE: 600; 310; 30; 20 INJECTION, SOLUTION INTRAVENOUS at 03:19

## 2022-04-20 RX ADMIN — SODIUM CHLORIDE 3 G: 900 INJECTION INTRAVENOUS at 00:49

## 2022-04-20 RX ADMIN — SODIUM CHLORIDE 3 G: 900 INJECTION INTRAVENOUS at 11:59

## 2022-04-20 RX ADMIN — ENOXAPARIN SODIUM 40 MG: 40 INJECTION SUBCUTANEOUS at 05:02

## 2022-04-20 RX ADMIN — HUMAN ALBUMIN MICROSPHERES AND PERFLUTREN 3 ML: 10; .22 INJECTION, SOLUTION INTRAVENOUS at 15:52

## 2022-04-20 RX ADMIN — TRAZODONE HYDROCHLORIDE 50 MG: 50 TABLET ORAL at 19:53

## 2022-04-20 RX ADMIN — Medication: at 05:03

## 2022-04-20 RX ADMIN — Medication: at 18:01

## 2022-04-20 RX ADMIN — TOPIRAMATE 50 MG: 25 TABLET, FILM COATED ORAL at 18:00

## 2022-04-20 RX ADMIN — CEFTRIAXONE SODIUM 2 G: 10 INJECTION, POWDER, FOR SOLUTION INTRAVENOUS at 18:01

## 2022-04-20 RX ADMIN — ARIPIPRAZOLE 30 MG: 15 TABLET ORAL at 05:03

## 2022-04-20 RX ADMIN — CARBAMAZEPINE 200 MG: 200 TABLET ORAL at 18:00

## 2022-04-20 RX ADMIN — ENOXAPARIN SODIUM 40 MG: 40 INJECTION SUBCUTANEOUS at 18:01

## 2022-04-20 ASSESSMENT — ENCOUNTER SYMPTOMS
SPUTUM PRODUCTION: 0
SHORTNESS OF BREATH: 0
WEAKNESS: 0
BACK PAIN: 0
HEARTBURN: 0
DIZZINESS: 0
NERVOUS/ANXIOUS: 0
DEPRESSION: 0
NAUSEA: 0
DIAPHORESIS: 0
BLURRED VISION: 0
ABDOMINAL PAIN: 0
COUGH: 0
CHILLS: 0
FEVER: 0
VOMITING: 0
DIARRHEA: 0
HEADACHES: 0
DOUBLE VISION: 0
MYALGIAS: 0
CONSTIPATION: 0

## 2022-04-20 ASSESSMENT — PAIN DESCRIPTION - PAIN TYPE
TYPE: ACUTE PAIN
TYPE: ACUTE PAIN

## 2022-04-20 ASSESSMENT — LIFESTYLE VARIABLES: SUBSTANCE_ABUSE: 0

## 2022-04-20 NOTE — PROGRESS NOTES
Assumed care at 0700. Pt A/O x4 and can make needs known. Call light within reach, bed locked in lowest position and hourly rounding initiated. Labs/ orders reviewed and white board updated. Pt denies pain and has family bedside.

## 2022-04-20 NOTE — CONSULTS
Consults   INFECTIOUS DISEASES INPATIENT CONSULT NOTE     Date of Service: 4/20/2022    Consult Requested By: Aline Lombardi D.O.    Reason for Consultation: Cellulitis and group A strep bacteremia    History of Present Illness:   Adama Burt III is a 26 y.o.  admitted 4/17/2022. Pt has a past medical history of obesity and gastric sleeve, sleep apnea and bipolar.  He states he had pain redness and swelling of his left leg which is now improved.    Hospital Course:   Patient was febrile on arrival up to 104, fevers have now resolved.  Leukocytosis of 26.9 on 4/18 which is now resolved.  KIERA right which is also now improved.  Chest x-ray unremarkable.  Blood cultures were obtained which were positive for group A strep.    Review Of Systems:  Review of Systems   Constitutional: Negative for chills, fever and malaise/fatigue.   HENT: Negative for hearing loss.    Eyes: Negative for blurred vision and double vision.   Respiratory: Negative for cough, sputum production and shortness of breath.    Cardiovascular: Positive for leg swelling. Negative for chest pain.   Gastrointestinal: Negative for abdominal pain, constipation, diarrhea, nausea and vomiting.   Genitourinary: Negative for dysuria.   Musculoskeletal: Negative for joint pain and myalgias.   Skin: Negative for rash.   Neurological: Negative for weakness.   Psychiatric/Behavioral: The patient is not nervous/anxious.          PMH:   Past Medical History:   Diagnosis Date   • ADHD (attention deficit hyperactivity disorder)    • Apnea, sleep    • Bipolar 1 disorder (HCC)    • Blood clotting disorder (HCC)     DVT LLE - April 2021   • Breath shortness     with exertion, pt states chronic    • Daytime sleepiness    • Gasping for breath    • High cholesterol    • Hypertension    • Obesity    • ODD (oppositional defiant disorder)    • Pneumonia 04/2021   • Sleep apnea     cpap, pt uses o2 with cpap machine unsure how many liters    • Snoring         PSH:  Past Surgical History:   Procedure Laterality Date   • CO LAP, CHRISS RESTRICT PROC, LONGITUDINAL GAS* N/A 10/29/2021    Procedure: GASTRECTOMY, SLEEVE, LAPAROSCOPIC;  Surgeon: Joni Quiles M.D.;  Location: [facility];  Service: General   • TONSILLECTOMY  3/17/2010    Performed by ANKIT HERNANDEZ at [facility]   • APPENDECTOMY CHILD     • OTHER      wisdom teeth extraction        FAMILY HX:  Family History   Family history unknown: Yes       SOCIAL HX:  Social History     Socioeconomic History   • Marital status: Single     Spouse name: Not on file   • Number of children: Not on file   • Years of education: Not on file   • Highest education level: Not on file   Occupational History   • Not on file   Tobacco Use   • Smoking status: Never Smoker   • Smokeless tobacco: Never Used   Vaping Use   • Vaping Use: Never used   Substance and Sexual Activity   • Alcohol use: No     Comment: rare    • Drug use: No   • Sexual activity: Not on file   Other Topics Concern   • Not on file   Social History Narrative   • Not on file     Social Determinants of Health     Financial Resource Strain: Not on file   Food Insecurity: Not on file   Transportation Needs: Not on file   Physical Activity: Not on file   Stress: Not on file   Social Connections: Not on file   Intimate Partner Violence: Not on file   Housing Stability: Not on file     Social History     Tobacco Use   Smoking Status Never Smoker   Smokeless Tobacco Never Used     Social History     Substance and Sexual Activity   Alcohol Use No    Comment: rare        Allergies/Intolerances:  No Known Allergies    History reviewed with the patient     Other Current Medications:    Current Facility-Administered Medications:   •  ammonium lactate (LAC-HYDRIN) 12 % lotion, , Topical, BID, Aline Lombardi D.O., Given at 04/20/22 0503  •  ibuprofen (MOTRIN) tablet 600 mg, 600 mg, Oral, Q6HRS PRN, NEELA Chaudhary.O., 600 mg at 04/20/22 0502  •   "polyethylene glycol/lytes (MIRALAX) PACKET 1 Packet, 1 Packet, Oral, QDAY PRN **AND** magnesium hydroxide (MILK OF MAGNESIA) suspension 30 mL, 30 mL, Oral, QDAY PRN **AND** bisacodyl (DULCOLAX) suppository 10 mg, 10 mg, Rectal, QDAY PRN, Alvarez Reeves D.O.  •  acetaminophen (Tylenol) tablet 650 mg, 650 mg, Oral, Q6HRS PRN, Aline Lombardi D.O., 650 mg at 22 0515  •  labetalol (NORMODYNE/TRANDATE) injection 10 mg, 10 mg, Intravenous, Q4HRS PRN, Alvarez Reeves D.O.  •  ampicillin/sulbactam (UNASYN) 3 g in  mL IVPB, 3 g, Intravenous, Q6HRS, Alvarez Reeves D.O., Stopped at 22 0533  •  ondansetron (ZOFRAN) syringe/vial injection 4 mg, 4 mg, Intravenous, Q4HRS PRN, Alvarez Reeves D.O.  •  ondansetron (ZOFRAN ODT) dispertab 4 mg, 4 mg, Oral, Q4HRS PRN, Alvarez Reeves D.O.  •  promethazine (PHENERGAN) tablet 12.5-25 mg, 12.5-25 mg, Oral, Q4HRS PRN, Alvarez Reeves D.O.  •  promethazine (PHENERGAN) suppository 12.5-25 mg, 12.5-25 mg, Rectal, Q4HRS PRN, Alvarez Reeves D.O.  •  prochlorperazine (COMPAZINE) injection 5-10 mg, 5-10 mg, Intravenous, Q4HRS PRN, Alvarez Reeves D.O.  •  carBAMazepine (TEGRETOL) tablet 200 mg, 200 mg, Oral, BID, Alvarez Reeves D.O., 200 mg at 22 0502  •  ARIPiprazole (Abilify) tablet 30 mg, 30 mg, Oral, DAILY, Alvarez Reeves D.O., 30 mg at 22 0503  •  traZODone (DESYREL) tablet 50 mg, 50 mg, Oral, QHS, Alvarez Reeves D.O., 50 mg at 22 2151  •  topiramate (TOPAMAX) tablet 50 mg, 50 mg, Oral, Q EVENING, Alvarez Reeves D.O., 50 mg at 22 1804  •  enoxaparin (LOVENOX) inj 40 mg, 40 mg, Subcutaneous, BID, Alvarze Reeves D.O., 40 mg at 22 0502  [unfilled]    Most Recent Vital Signs:  BP (!) 95/53   Pulse 64   Temp 36.1 °C (97 °F) (Temporal)   Resp 19   Ht 1.803 m (5' 11\")   Wt (!) 178 kg (392 lb 6.7 oz)   SpO2 92%   BMI 54.73 kg/m²   Temp  Av.8 °C (100 °F)  Min: 36.1 °C (97 °F)  Max: 40.1 °C (104.1 °F)    Physical " Exam:  Physical Exam  Constitutional:       Appearance: He is obese.   HENT:      Head: Normocephalic.      Right Ear: External ear normal.      Left Ear: External ear normal.      Nose: Nose normal.      Mouth/Throat:      Mouth: Mucous membranes are moist.      Pharynx: Oropharynx is clear.   Eyes:      Extraocular Movements: Extraocular movements intact.      Conjunctiva/sclera: Conjunctivae normal.      Pupils: Pupils are equal, round, and reactive to light.   Cardiovascular:      Rate and Rhythm: Normal rate and regular rhythm.      Heart sounds: Normal heart sounds.   Pulmonary:      Effort: Pulmonary effort is normal.      Breath sounds: Normal breath sounds.   Musculoskeletal:      Cervical back: Normal range of motion and neck supple.      Right lower leg: Edema present.      Left lower leg: Edema present.   Skin:     General: Skin is warm and dry.   Neurological:      General: No focal deficit present.      Mental Status: He is alert and oriented to person, place, and time.   Psychiatric:         Mood and Affect: Mood normal.         Behavior: Behavior normal.             Pertinent Lab Results:  Recent Labs     04/18/22 0133 04/19/22  0524 04/20/22  0232   WBC 26.9* 13.6* 9.3      Recent Labs     04/18/22 0133 04/19/22  0524 04/20/22  0232   HEMOGLOBIN 15.7 13.3* 12.4*   HEMATOCRIT 46.3 40.5* 36.6*   MCV 88.4 91.2 89.1   MCH 30.0 30.0 30.2   PLATELETCT 210 171 153*         Recent Labs     04/18/22 0133 04/19/22  0524 04/20/22  0232   SODIUM 136 136 136   POTASSIUM 4.0 3.6 3.6   CHLORIDE 99 103 102   CO2 24 25 23   CREATININE 1.55* 1.16 0.90        Recent Labs     04/17/22  1702 04/18/22  0133   ALBUMIN 4.8 4.0        Pertinent Micro:  Results     Procedure Component Value Units Date/Time    E-Test [[id number]] Collected: 04/17/22 1702    Order Status: Completed Specimen: Other Updated: 04/20/22 1041     ETEST Sensitivity FINAL    Narrative:      MS5 tel. [phone] 04/19/2022, 11:17, RB PERF. RESULTS  "CALLED TO:Rebecca Candelario Rn  2 of 2 blood culture x2  Sites order. Per Hospital Policy:  Only change Specimen Src: to \"Line\" if specified by physician  order.    Blood Culture [[id number]]  (Abnormal)  (Susceptibility) Collected: 04/17/22 1702    Order Status: Completed Specimen: Blood from Peripheral Updated: 04/20/22 1041     Significant Indicator POS     Source BLD     Site PERIPHERAL     Culture Result Growth detected by Bactec instrument. 04/18/2022  06:07      Streptococcus pyogenes (Group A)  This isolate does NOT demonstrate inducible Clindamycin  resistance in vitro.      Narrative:      CALL  Garza  MS5 tel. [phone],  CALLED  MS5 tel. [phone] 04/19/2022, 11:17, RB PERF. RESULTS CALLED  TO:Rebecca Candelario Rn  2 of 2 blood culture x2  Sites order. Per Hospital Policy:  Only change Specimen Src: to \"Line\" if specified by physician  order.  Left Forearm/Arm    Susceptibility     Streptococcus pyogenes (group a) (1)     Antibiotic Interpretation Microscan   Method Status    Penicillin Sensitive 0.047 mcg/mL E-TEST Final    Clindamycin Sensitive - mcg/mL E-TEST Final                   BLOOD CULTURE [[id number]] Collected: 04/19/22 1243    Order Status: Completed Specimen: Blood from Peripheral Updated: 04/20/22 0723     Significant Indicator NEG     Source BLD     Site PERIPHERAL     Culture Result No Growth  Note: Blood cultures are incubated for 5 days and  are monitored continuously.Positive blood cultures  are called to the RN and reported as soon as  they are identified.      Narrative:      Per Hospital Policy: Only change Specimen Src: to \"Line\" if  specified by physician order.  Left Forearm/Arm    BLOOD CULTURE [[id number]] Collected: 04/19/22 1243    Order Status: Completed Specimen: Blood from Peripheral Updated: 04/20/22 0723     Significant Indicator NEG     Source BLD     Site PERIPHERAL     Culture Result No Growth  Note: Blood cultures are incubated for 5 days and  are monitored " "continuously.Positive blood cultures  are called to the RN and reported as soon as  they are identified.      Narrative:      Per Hospital Policy: Only change Specimen Src: to \"Line\" if  specified by physician order.  Left Hand    Blood Culture [[id number]] Collected: 04/17/22 1704    Order Status: Completed Specimen: Blood from Peripheral Updated: 04/18/22 0833     Significant Indicator NEG     Source BLD     Site PERIPHERAL     Culture Result No Growth  Note: Blood cultures are incubated for 5 days and  are monitored continuously.Positive blood cultures  are called to the RN and reported as soon as  they are identified.      Narrative:      1 of 2 for Blood Culture x 2 sites order. Per Hospital  Policy: Only change Specimen Src: to \"Line\" if specified by  physician order.  Right Hand    MRSA By PCR (Amp) [[id number]]     Order Status: Sent Specimen: Respirate from Nares     CoV-2, FLU A/B, and RSV by PCR (2-4 Hours CEPHEID) : Collect NP swab in VTM [[id number]] Collected: 04/17/22 1702    Order Status: Completed Specimen: Respirate Updated: 04/17/22 1805     Influenza virus A RNA Negative     Influenza virus B, PCR Negative     RSV, PCR Negative     SARS-CoV-2 by PCR NotDetected     Comment: PATIENTS: Important information regarding your results and instructions can  be found at [email or web address]   \"After your  Covid-19 Test\"    [facility] providers: PLEASE REFER TO DE-ESCALATION AND RETESTING PROTOCOL  on [email or web address]    **The [employer] GeneXpert Xpress SARS-CoV-2 RT-PCR Test has been made  available for use under the Emergency Use Authorization (EUA) only.          SARS-CoV-2 Source NP Swab    Urinalysis [[id number]]  (Abnormal) Collected: 04/17/22 1702    Order Status: Completed Specimen: Respirate Updated: 04/17/22 1730     Color Yellow     Character Clear     Specific Gravity 1.017     Ph 6.5     Glucose Negative mg/dL      Ketones 15 mg/dL      Protein 100 mg/dL      Bilirubin " Negative     Urobilinogen, Urine 1.0     Nitrite Negative     Leukocyte Esterase Negative     Occult Blood Negative     Micro Urine Req Microscopic        Blood Culture   Date Value Ref Range Status   09/18/2015 No growth after 5 days of incubation.  Final        Studies:  DX-CHEST-PORTABLE (1 VIEW)    Result Date: 4/17/2022 4/17/2022 4:55 PM HISTORY/REASON FOR EXAM:  Sepsis Fever, anxiety x today; ?Possible sepsis, left foot pain cut on the bottom of pt's toe TECHNIQUE/EXAM DESCRIPTION AND NUMBER OF VIEWS: Single portable view of the chest. COMPARISON: None FINDINGS: No pulmonary infiltrates or consolidations are noted. No pleural effusion. No pneumothorax. Normal cardiopericardial silhouette.     1. No acute cardiopulmonary abnormalities are identified.    US-RUQ    Result Date: 4/18/2022 4/18/2022 8:05 AM HISTORY/REASON FOR EXAM:  Abnormal Labs Abdominal pain TECHNIQUE/EXAM DESCRIPTION AND NUMBER OF VIEWS:  Real-time sonography of the liver and biliary tree. COMPARISON: None FINDINGS: Body habitus decreases sensitivity of the study. The liver is normal in contour. There is no evidence of solid mass lesion. The liver measures 21.17 cm. The gallbladder is normal. There is no evidence of cholelithiasis. The gallbladder wall thickness measures 2.60 mm. There is no pericholecystic fluid. The common duct measures 4.10 mm. The visualized pancreas is unremarkable. The visualized aorta is normal in caliber. Intrahepatic IVC is patent. The portal vein is patent with hepatopetal flow. The MPV measures 0.95 cm. The right kidney measures 13.07 cm. Suboptimally visualized due to habitus. Possible mild hydronephrosis. There is no ascites.     Possible mild RIGHT hydronephrosis      ASSESSMENT/PLAN:     26 y.o.  admitted 4/17/2022. Pt has a past medical history of obesity and gastric sleeve, sleep apnea and bipolar.  He states he had pain redness and swelling of his left leg which is now improved.    Hospital Course:   Patient  was febrile on arrival up to 104, fevers have now resolved.  Leukocytosis of 26.9 on 4/18 which is now resolved.  KIERA right which is also now improved.  Chest x-ray unremarkable.  Blood cultures were obtained which were positive for group A strep.    Problem List     Sepsis, secondary to below, improved  Group A strep bacteremia  -Cultures on 4/17 1/2  +GAS   -Blood cultures on 4/19 are no growth to date\  Left lower extremity cellulitis  KIERA, improved    Plan     --- Stopped Unasyn and transition to ceftriaxone  --- Noted echocardiogram ordered and pending  --- Presuming echocardiogram is negative we will plan on a 2-week total antibiotic course with an end date of 5/1/22 -if patient is discharged okay to transition to linezolid 600 mg twice daily to complete the antibiotic course.  Please obtain prior authorization ensure patient will be able to afford the antibiotic.   --- Follow-up repeat blood cultures, no growth to date      Plan of care discussed with IM Aline Lombardi D.O. ID will follow peripherally.    Sarah Payne M.D.

## 2022-04-20 NOTE — CARE PLAN
The patient is Stable - Low risk of patient condition declining or worsening    Shift Goals  Clinical Goals: ABX  Patient Goals: comfort  Family Goals: N/A    Progress made toward(s) clinical / shift goals:  Pt is up ambulating to bathroom per self. Pt denies foot pain. Fluids discontinued per order.     Patient is not progressing towards the following goals:

## 2022-04-20 NOTE — PROGRESS NOTES
Received report from day shift RN. Assumed pt care at 1900. Assessment completed. Pt A&Ox4 . Reports pain of 0/10. No s/s of discomfort/distress noted. Bed in lowest position, bed locked, call light within reach. Labs and orders reviewed. Communication board updated. Hourly rounding in place.

## 2022-04-20 NOTE — PROGRESS NOTES
[facility] Medicine Daily Progress Note    Date of Service  4/20/2022    Chief Complaint  Adama Burt III is a 26 y.o. male admitted 4/17/2022 with left foot pain    Hospital Course  Adama Burt III is a 26 y.o. male who presented 4/17/2022 with bipolar, morbid obesity, apnea who presents with left foot pain.  Patient reports that this afternoon he started noticing fevers and chills and malaise.  He reports has had some left foot pain with hyperkeratosis of both feet with deep cracks in the skin.  He reports he typically has a pedicure done every 2 weeks but has not had one in a few weeks.  He denies any trauma to his foot.  He reports that the pain today has been excruciating and he developed some small amount of erythema.  He reports that at home his fever was 106 and he decided to be evaluated in the ED.  He denies any sick contacts.  He has been vaccinated for COVID.      Interval Problem Update    Improving lethargy, afebrile  Left heel dry and cracked, nontender to palpation, no discharge noted      I have personally seen and examined the patient at bedside. I discussed the plan of care with patient and bedside RN.    Consultants/Specialty  none    Code Status  Full Code    Disposition  Patient is not medically cleared for discharge.   Anticipate discharge to to home with close outpatient follow-up.  I have placed the appropriate orders for post-discharge needs.    Review of Systems  Review of Systems   Constitutional: Negative for chills, diaphoresis, fever and malaise/fatigue.   Respiratory: Negative for shortness of breath.    Cardiovascular: Positive for leg swelling. Negative for chest pain.   Gastrointestinal: Negative for abdominal pain, constipation, heartburn and nausea.   Genitourinary: Negative for dysuria.   Musculoskeletal: Negative for back pain, joint pain and myalgias.        Left foot pain   Neurological: Negative for dizziness and headaches.   Psychiatric/Behavioral:  Negative for depression and substance abuse. The patient is not nervous/anxious.    All other systems reviewed and are negative.       Physical Exam  Temp:  [36.1 °C (97 °F)-37 °C (98.6 °F)] 36.1 °C (97 °F)  Pulse:  [64-80] 64  Resp:  [18-19] 19  BP: ()/(53-93) 95/53  SpO2:  [92 %-98 %] 92 %    Physical Exam  Vitals and nursing note reviewed.   Constitutional:       General: He is not in acute distress.     Appearance: Normal appearance. He is obese. He is ill-appearing. He is not diaphoretic.   HENT:      Head: Normocephalic and atraumatic.   Eyes:      General: No scleral icterus.        Right eye: No discharge.         Left eye: No discharge.      Extraocular Movements: Extraocular movements intact.      Pupils: Pupils are equal, round, and reactive to light.   Cardiovascular:      Rate and Rhythm: Normal rate and regular rhythm.      Heart sounds: Normal heart sounds.   Pulmonary:      Effort: Pulmonary effort is normal. No respiratory distress.      Breath sounds: Normal breath sounds.   Abdominal:      General: Abdomen is flat. Bowel sounds are normal. There is no distension.      Tenderness: There is no abdominal tenderness.   Musculoskeletal:         General: Tenderness (Left foot tenderness) present. No swelling.      Left lower leg: Edema present.      Comments: Left foot with ulceration between first and second toe. Erythema present, extending from foot to mid-calf.   Skin:     Coloration: Skin is not jaundiced.      Findings: No erythema or rash.   Neurological:      General: No focal deficit present.      Mental Status: He is alert and oriented to person, place, and time.      Cranial Nerves: No cranial nerve deficit.      Sensory: No sensory deficit.      Motor: Weakness present.   Psychiatric:         Attention and Perception: Attention normal.         Speech: Speech normal.         Behavior: Behavior is cooperative.         Judgment: Judgment is not impulsive.         Fluids    Intake/Output  Summary (Last 24 hours) at 4/20/2022 1145  Last data filed at 4/20/2022 0900  Gross per 24 hour   Intake 2737 ml   Output --   Net 2737 ml       Laboratory  Recent Labs     04/18/22 0133 04/19/22 0524 04/20/22  0232   WBC 26.9* 13.6* 9.3   RBC 5.24 4.44* 4.11*   HEMOGLOBIN 15.7 13.3* 12.4*   HEMATOCRIT 46.3 40.5* 36.6*   MCV 88.4 91.2 89.1   MCH 30.0 30.0 30.2   MCHC 33.9 32.8* 33.9   RDW 42.5 46.5 45.0   PLATELETCT 210 171 153*   MPV 11.2 11.4 11.0     Recent Labs     04/18/22 0133 04/19/22 0524 04/20/22  0232   SODIUM 136 136 136   POTASSIUM 4.0 3.6 3.6   CHLORIDE 99 103 102   CO2 24 25 23   GLUCOSE 117* 99 98   BUN 29* 21 15   CREATININE 1.55* 1.16 0.90   CALCIUM 9.4 8.8 8.8                   Imaging  US-RUQ   Final Result      Possible mild RIGHT hydronephrosis      DX-CHEST-PORTABLE (1 VIEW)   Final Result         1. No acute cardiopulmonary abnormalities are identified.      EC-ECHOCARDIOGRAM COMPLETE W/O CONT    (Results Pending)        Assessment/Plan  * Sepsis due to Streptococcus species with acute renal failure (HCC)- (present on admission)  Assessment & Plan  This is Sepsis Present on admission  SIRS criteria identified on my evaluation include: Fever, with temperature greater than 101 deg F and Leukocytosis, with WBC greater than 12,000  Source is leg cellulitis  End organ dysfunction as evidenced by acute renal failure without other suspected cause  Sepsis protocol initiated  Fluid resuscitation ordered per protocol  Crystalloid Fluid Administration: Fluid resuscitation ordered per standard protocol - 30 mL/kg per current or ideal body weight  IV antibiotics as appropriate for source of sepsis  Reassessment: I have reassessed the patient's hemodynamic status  covid neg, UA neg  Blood culture 1/2 bottles with GPCs. Follow up second bottle and speciation.  Continue Unasyn    4/20 group A strep bacteremia, repeat cultures are negative  Improving leukocytosis  On IV Unasyn, ID consult for further  antibiotic recommendation  Echocardiogram    KIERA (acute kidney injury) (HCC)- (present on admission)  Assessment & Plan  Cr increased to 1.55 from baseline 0.8.  Vancomycin discontinued.  Continue IVF.  Continue treatment for sepsis as noted.  Avoid NSAIDs and other nephrotoxic medications.  Monitor daily.  Consider Nephrology assistance if no improvement with treatment for sepsis.    4/19 CR improving, cont ivf  Renal  Us, mild right hydro, monitor    Cellulitis of left foot- (present on admission)  Assessment & Plan  Patient presenting with left foot cellulitis.  Patient has significant tenderness to his left foot.  He denies any recent trauma or pedicures.  He reports he has cracks in his feet that are very painful.  Likely the source of the patient's sepsis.  Discontinued vancomycin per antibiotic stewardship team recommendation, appreciate recs.  Continue Unasyn.  Wound care consulted, appreciate assistance.  Consider imaging (likely w/o contrast due to renal function) and/or ortho consult if no improvement.    Transaminitis- (present on admission)  Assessment & Plan  AST 49, ALT 53, Alk P 154, T bili 1.3.  Possibly due to fatty liver  Hepatitis panel ordered  Daily CMP  Right upper quadrant ultrasound    Proteinuria- (present on admission)  Assessment & Plan  Proteinuria noted in the patient's urinalysis. Patient also has KIERA.  Consider Nephrology assistance if no improvement with treatment for sepsis.    Essential hypertension- (present on admission)  Assessment & Plan  BP low to normal.  Hold home hydrochlorothiazide and losartan in the setting of sepsis.  As needed labetalol    Bipolar disorder, current episode mixed, moderate (HCC)- (present on admission)  Assessment & Plan  Continue home medications.    Morbid obesity (HCC)- (present on admission)  Assessment & Plan  BMI 54.73.  He reports that prior to his surgery he weighed over 500 pounds.  Patient reports that he had a weight loss surgery performed in  October 2021 with no complications.  Diet, exercise and weight loss    Central sleep apnea- (present on admission)  Assessment & Plan  Likely due to morbid obesity       VTE prophylaxis: SCDs/TEDs and enoxaparin ppx    I have performed a physical exam and reviewed and updated ROS and Plan today (4/20/2022). In review of yesterday's note (4/19/2022), there are no changes except as documented above.

## 2022-04-21 VITALS
SYSTOLIC BLOOD PRESSURE: 104 MMHG | DIASTOLIC BLOOD PRESSURE: 53 MMHG | TEMPERATURE: 97.5 F | RESPIRATION RATE: 16 BRPM | HEIGHT: 71 IN | OXYGEN SATURATION: 96 % | WEIGHT: 315 LBS | HEART RATE: 66 BPM | BODY MASS INDEX: 44.1 KG/M2

## 2022-04-21 LAB
ANION GAP SERPL CALC-SCNC: 10 MMOL/L (ref 7–16)
BUN SERPL-MCNC: 12 MG/DL (ref 8–22)
CALCIUM SERPL-MCNC: 9.3 MG/DL (ref 8.5–10.5)
CHLORIDE SERPL-SCNC: 106 MMOL/L (ref 96–112)
CO2 SERPL-SCNC: 24 MMOL/L (ref 20–33)
CREAT SERPL-MCNC: 0.88 MG/DL (ref 0.5–1.4)
GFR SERPLBLD CREATININE-BSD FMLA CKD-EPI: 120 ML/MIN/1.73 M 2
GLUCOSE SERPL-MCNC: 85 MG/DL (ref 65–99)
POTASSIUM SERPL-SCNC: 4.1 MMOL/L (ref 3.6–5.5)
SODIUM SERPL-SCNC: 140 MMOL/L (ref 135–145)

## 2022-04-21 PROCEDURE — 97535 SELF CARE MNGMENT TRAINING: CPT

## 2022-04-21 PROCEDURE — 700102 HCHG RX REV CODE 250 W/ 637 OVERRIDE(OP): Performed by: STUDENT IN AN ORGANIZED HEALTH CARE EDUCATION/TRAINING PROGRAM

## 2022-04-21 PROCEDURE — 700111 HCHG RX REV CODE 636 W/ 250 OVERRIDE (IP): Performed by: STUDENT IN AN ORGANIZED HEALTH CARE EDUCATION/TRAINING PROGRAM

## 2022-04-21 PROCEDURE — 99239 HOSP IP/OBS DSCHRG MGMT >30: CPT | Performed by: INTERNAL MEDICINE

## 2022-04-21 PROCEDURE — 80048 BASIC METABOLIC PNL TOTAL CA: CPT

## 2022-04-21 PROCEDURE — A9270 NON-COVERED ITEM OR SERVICE: HCPCS | Performed by: STUDENT IN AN ORGANIZED HEALTH CARE EDUCATION/TRAINING PROGRAM

## 2022-04-21 RX ORDER — AMMONIUM LACTATE 12 G/100G
1 LOTION TOPICAL 2 TIMES DAILY
Qty: 225 G | Refills: 1 | Status: SHIPPED | OUTPATIENT
Start: 2022-04-21 | End: 2022-06-12

## 2022-04-21 RX ORDER — IBUPROFEN 600 MG/1
600 TABLET ORAL EVERY 6 HOURS PRN
Qty: 30 TABLET | Refills: 0 | Status: SHIPPED | OUTPATIENT
Start: 2022-04-21 | End: 2022-06-12

## 2022-04-21 RX ORDER — LINEZOLID 600 MG/1
600 TABLET, FILM COATED ORAL 2 TIMES DAILY
Qty: 20 TABLET | Refills: 0 | Status: SHIPPED | OUTPATIENT
Start: 2022-04-21 | End: 2022-05-01

## 2022-04-21 RX ADMIN — ENOXAPARIN SODIUM 40 MG: 40 INJECTION SUBCUTANEOUS at 06:13

## 2022-04-21 RX ADMIN — CARBAMAZEPINE 200 MG: 200 TABLET ORAL at 06:14

## 2022-04-21 RX ADMIN — ARIPIPRAZOLE 30 MG: 15 TABLET ORAL at 06:14

## 2022-04-21 RX ADMIN — Medication: at 06:15

## 2022-04-21 ASSESSMENT — PAIN DESCRIPTION - PAIN TYPE
TYPE: ACUTE PAIN
TYPE: ACUTE PAIN

## 2022-04-21 ASSESSMENT — FIBROSIS 4 INDEX: FIB4 SCORE: 0.77

## 2022-04-21 NOTE — THERAPY
Rec'd pt up self in his room just returning from the bathroom.  Educated pt regarding role of PT in the acute care setting.  Pt politely declines any need.  He plans to d/c home today and reports having a ride coming.  He was noted to be able to ambulate w/o loss of balance and w/o need of assistive device and w/o antalgia.  PT will no longer follow.

## 2022-04-21 NOTE — PROGRESS NOTES
Arrive to dc lounge via wheelchair. A/O, no complaints. DC instructions reviewed w/pt, verbalized understanding.

## 2022-04-21 NOTE — DISCHARGE PLANNING
Anticipated Discharge Disposition: Home    Action: LSW called Pharmacy who confirmed that Pt's zyvox was approved and Pt has no copay.     Barriers to Discharge: None    Plan: LSW to update MD and BS RN.

## 2022-04-21 NOTE — PROGRESS NOTES
Assumed care of pt  at 1900. Report received by Day RN. Pt is A&O x 4. Patient denies pain at this time. Bed in lowest locked position, call light within reach, hourly rounding in place. Labs reviewed, orders reviewed, communication board updated.

## 2022-04-21 NOTE — PROGRESS NOTES
Assumed care of pt at 0700. Report received by NOC RN. Pt is A&O x 4. Pain is 0. Pt is sitting up in bed, ambulated steady to bathroom. Plan is to go home today pending orders. Bed in lowest locked position, call light in reach, hourly rounding in place. Labs reviewed, orders reviewed, communication board updated. TM.      Yes

## 2022-04-21 NOTE — DISCHARGE PLANNING
Pharmacy still had [id number] orders ready, was able to reach patient's aunt who requested I reach out to jono'  Gaby. Gaby said patient did not receive medications and as he did not have transportation back to the hospital, requested pharmacy to transfer medications to [facility] pharmacy that will deliver to patient address. Spoke with [facility] and confirmed they can deliver patient medications today. Transferred prescriptions to [facility] pharmacy.

## 2022-04-21 NOTE — DISCHARGE SUMMARY
"Discharge Summary    CHIEF COMPLAINT ON ADMISSION  Chief Complaint   Patient presents with   • Foot Pain     L sided. \"dry skin cut\" on the bottom of pt's toe     • Anxiety       Reason for Admission  ems     Admission Date  4/17/2022    CODE STATUS  Full Code    HPI & HOSPITAL COURSE    Adama Burt III is a 26 y.o. male who presented 4/17/2022 with bipolar, morbid obesity, apnea who presents with left foot pain.  Patient reports that this afternoon he started noticing fevers and chills and malaise.  He reports has had some left foot pain with hyperkeratosis of both feet with deep cracks in the skin.  He reports he typically has a pedicure done every 2 weeks but has not had one in a few weeks.  He denies any trauma to his foot.  He reports that the pain today has been excruciating and he developed some small amount of erythema.  He reports that at home his fever was 106 and he decided to be evaluated in the ED.  He denies any sick contacts.  He has been vaccinated for COVID.    The patient was admitted for left lower extremity/heel cellulitis and was placed on IV antibiotics.  Blood culture 1 of 4 was positive for group A strep pyogenes.  ID has been consulted with recommendations to continue antibiotics through May 1/2022.  He has been transitioned to Zyvox to complete a course of antibiotics.  Echocardiogram with no vegetation noted.  Leukocytosis, swelling and pain has improved.      Therefore, he is discharged in good and stable condition to home with close outpatient follow-up.    The patient met 2-midnight criteria for an inpatient stay at the time of discharge.    Discharge Date  4/21/2022    FOLLOW UP ITEMS POST DISCHARGE  Primary care provider in 1 week  Zyvox through May 1, 2022    DISCHARGE DIAGNOSES  Principal Problem:    Sepsis due to Streptococcus species with acute renal failure (HCC) POA: Yes  Active Problems:    Central sleep apnea POA: Yes    Morbid obesity (HCC) POA: Yes    Bipolar " disorder, current episode mixed, moderate (HCC) POA: Yes    Essential hypertension POA: Yes    Proteinuria POA: Yes    Transaminitis POA: Yes    Cellulitis of left foot POA: Yes    KIERA (acute kidney injury) (HCC) POA: Yes  Resolved Problems:    * No resolved hospital problems. *      FOLLOW UP  No future appointments.  Stoney WILCOX [facility], A.P.R.N.  [address]  [place] [address]  [phone]    Go on 4/25/2022  Please arrive at 8:45 am for you hospital follow up at 9: 00 am. Thank you       MEDICATIONS ON DISCHARGE     Medication List      START taking these medications      Instructions   ammonium lactate 12 % Lotn  Commonly known as: LAC-HYDRIN   Apply 1 Application topically 2 times a day.  Dose: 1 Application     ibuprofen 600 MG Tabs  Commonly known as: MOTRIN   Take 1 Tablet by mouth every 6 hours as needed.  Dose: 600 mg     linezolid 600 MG Tabs  Commonly known as: Zyvox   Take 1 Tablet by mouth 2 times a day for 10 days.  Dose: 600 mg        CHANGE how you take these medications      Instructions   metFORMIN  MG Tb24  What changed:   · how much to take  · how to take this  · when to take this  Commonly known as: GLUCOPHAGE XR   TAKE 1 TABLET BY MOUTH ONCE DAILY.        CONTINUE taking these medications      Instructions   acetaminophen 500 MG Tabs  Commonly known as: TYLENOL   Take 1,000 mg by mouth 2 times a day as needed. Indications: Pain  Dose: 1,000 mg     aripiprazole 30 MG tablet  Commonly known as: ABILIFY   Take 30 mg by mouth every day.  Dose: 30 mg     atorvastatin 20 MG Tabs  Commonly known as: LIPITOR   Take 20 mg by mouth every morning.  Dose: 20 mg     carBAMazepine 200 MG Tabs  Commonly known as: TEGRETOL   Take 200 mg by mouth 2 times a day.  Dose: 200 mg     nystatin powder  Commonly known as: MYCOSTATIN   Apply 1 Application topically 4 times a day. Apply to folds  Dose: 1 Application     topiramate 50 MG tablet  Commonly known as: TOPAMAX   Take 1 tablet by mouth every  evening.  Dose: 50 mg     traZODone 50 MG Tabs  Commonly known as: DESYREL   Take 50 mg by mouth at bedtime.  Dose: 50 mg        STOP taking these medications    hydroCHLOROthiazide 25 MG Tabs  Commonly known as: HYDRODIURIL     losartan 100 MG Tabs  Commonly known as: COZAAR            Allergies  No Known Allergies    DIET  Orders Placed This Encounter   Procedures   • Diet Order Diet: Regular     Standing Status:   Standing     Number of Occurrences:   1     Order Specific Question:   Diet:     Answer:   Regular [1]       ACTIVITY  As tolerated.  Weight bearing as tolerated    CONSULTATIONS  ID    PROCEDURES  None    LABORATORY  Lab Results   Component Value Date    SODIUM 140 04/21/2022    POTASSIUM 4.1 04/21/2022    CHLORIDE 106 04/21/2022    CO2 24 04/21/2022    GLUCOSE 85 04/21/2022    BUN 12 04/21/2022    CREATININE 0.88 04/21/2022    CREATININE 0.8 12/18/2006        Lab Results   Component Value Date    WBC 9.3 04/20/2022    HEMOGLOBIN 12.4 (L) 04/20/2022    HEMATOCRIT 36.6 (L) 04/20/2022    PLATELETCT 153 (L) 04/20/2022        Total time of the discharge process exceeds 42 minutes.

## 2022-04-21 NOTE — THERAPY
"OT Contact Note    Patient Name: Adama Burt III  Age:  26 y.o., Sex:  male  Medical Record #: [id number]  Today's Date: 4/21/2022    Attempted to see patient for OT evaluation, pt up self walking back from bathroom fully dressed, asked if patient had any concerns for discharging home pt stated \"No I'm good, my ride comes at noon\" Will complete OT order at this time, no needs identified.       Russell Almazan OTD, OTR/L    "

## 2022-04-21 NOTE — DISCHARGE INSTRUCTIONS
Discharge Instructions    Discharged to home by car with relative. Discharged via wheelchair, hospital escort: Yes.  Special equipment needed: Not Applicable    Be sure to schedule a follow-up appointment with your primary care doctor or any specialists as instructed.     Discharge Plan:   Diet Plan: Discussed  Activity Level: Discussed  Confirmed Follow up Appointment: Appointment Scheduled  Confirmed Symptoms Management: Discussed  Medication Reconciliation Updated: Yes    I understand that a diet low in cholesterol, fat, and sodium is recommended for good health. Unless I have been given specific instructions below for another diet, I accept this instruction as my diet prescription.   Other diet:     Special Instructions: None    · Is patient discharged on Warfarin / Coumadin?   No     Depression / Suicide Risk    As you are discharged from this [facility] facility, it is important to learn how to keep safe from harming yourself.    Recognize the warning signs:  · Abrupt changes in personality, positive or negative- including increase in energy   · Giving away possessions  · Change in eating patterns- significant weight changes-  positive or negative  · Change in sleeping patterns- unable to sleep or sleeping all the time   · Unwillingness or inability to communicate  · Depression  · Unusual sadness, discouragement and loneliness  · Talk of wanting to die  · Neglect of personal appearance   · Rebelliousness- reckless behavior  · Withdrawal from people/activities they love  · Confusion- inability to concentrate     If you or a loved one observes any of these behaviors or has concerns about self-harm, here's what you can do:  · Talk about it- your feelings and reasons for harming yourself  · Remove any means that you might use to hurt yourself (examples: pills, rope, extension cords, firearm)  · Get professional help from the community (Mental Health, Substance Abuse, psychological counseling)  · Do not be  alone:Call your Safe Contact- someone whom you trust who will be there for you.  · Call your local CRISIS HOTLINE 972-4577 or 884-055-5874  · Call your local Children's Mobile Crisis Response Team [place] (412) 938-9199 or www.Qonf  · Call the toll free National Suicide Prevention Hotlines   · National Suicide Prevention Lifeline 677-127-TXYM (4689)  · [employer] Hope Line Network 800-SUICIDE (793-7045)    Cellulitis, Adult    Cellulitis is a skin infection. The infected area is often warm, red, swollen, and sore. It occurs most often in the arms and lower legs. It is very important to get treated for this condition.  What are the causes?  This condition is caused by bacteria. The bacteria enter through a break in the skin, such as a cut, burn, insect bite, open sore, or crack.  What increases the risk?  This condition is more likely to occur in people who:  · Have a weak body defense system (immune system).  · Have open cuts, burns, bites, or scrapes on the skin.  · Are older than 60 years of age.  · Have a blood sugar problem (diabetes).  · Have a long-lasting (chronic) liver disease (cirrhosis) or kidney disease.  · Are very overweight (obese).  · Have a skin problem, such as:  ? Itchy rash (eczema).  ? Slow movement of blood in the veins (venous stasis).  ? Fluid buildup below the skin (edema).  · Have been treated with high-energy rays (radiation).  · Use IV drugs.  What are the signs or symptoms?  Symptoms of this condition include:  · Skin that is:  ? Red.  ? Streaking.  ? Spotting.  ? Swollen.  ? Sore or painful when you touch it.  ? Warm.  · A fever.  · Chills.  · Blisters.  How is this diagnosed?  This condition is diagnosed based on:  · Medical history.  · Physical exam.  · Blood tests.  · Imaging tests.  How is this treated?  Treatment for this condition may include:  · Medicines to treat infections or allergies.  · Home care, such as:  ? Rest.  ? Placing cold or warm cloths (compresses)  on the skin.  · Hospital care, if the condition is very bad.  Follow these instructions at home:  Medicines  · Take over-the-counter and prescription medicines only as told by your doctor.  · If you were prescribed an antibiotic medicine, take it as told by your doctor. Do not stop taking it even if you start to feel better.  General instructions    · Drink enough fluid to keep your pee (urine) pale yellow.  · Do not touch or rub the infected area.  · Raise (elevate) the infected area above the level of your heart while you are sitting or lying down.  · Place cold or warm cloths on the area as told by your doctor.  · Keep all follow-up visits as told by your doctor. This is important.  Contact a doctor if:  · You have a fever.  · You do not start to get better after 1-2 days of treatment.  · Your bone or joint under the infected area starts to hurt after the skin has healed.  · Your infection comes back. This can happen in the same area or another area.  · You have a swollen bump in the area.  · You have new symptoms.  · You feel ill and have muscle aches and pains.  Get help right away if:  · Your symptoms get worse.  · You feel very sleepy.  · You throw up (vomit) or have watery poop (diarrhea) for a long time.  · You see red streaks coming from the area.  · Your red area gets larger.  · Your red area turns dark in color.  These symptoms may represent a serious problem that is an emergency. Do not wait to see if the symptoms will go away. Get medical help right away. Call your local emergency services (911 in the [place] Do not drive yourself to the hospital.  Summary  · Cellulitis is a skin infection. The area is often warm, red, swollen, and sore.  · This condition is treated with medicines, rest, and cold and warm cloths.  · Take all medicines only as told by your doctor.  · Tell your doctor if symptoms do not start to get better after 1-2 days of treatment.  This information is not intended to replace advice  given to you by your health care provider. Make sure you discuss any questions you have with your health care provider.  Document Released: 06/05/2009 Document Revised: 05/09/2019 Document Reviewed: 05/09/2019  Elsevier Patient Education © 2020 [employer]

## 2022-04-21 NOTE — CARE PLAN
The patient is Stable - Low risk of patient condition declining or worsening    Shift Goals  Clinical Goals: rest  Patient Goals: comfort  Family Goals: N/A    Progress made toward(s) clinical / shift goals:  Pt progressing towards goals during shift.       Problem: Knowledge Deficit - Standard  Goal: Patient and family/care givers will demonstrate understanding of plan of care, disease process/condition, diagnostic tests and medications  Outcome: Progressing   Pt updated on plan of care.   Problem: Pain - Standard  Goal: Alleviation of pain or a reduction in pain to the patient’s comfort goal  Outcome: Progressing   Pt denied feeling pain during shift.

## 2022-04-22 ENCOUNTER — PATIENT OUTREACH (OUTPATIENT)
Dept: HEALTH INFORMATION MANAGEMENT | Facility: OTHER | Age: 27
End: 2022-04-22
Payer: MEDICAID

## 2022-04-22 LAB
BACTERIA BLD CULT: NORMAL
SIGNIFICANT IND 70042: NORMAL
SITE SITE: NORMAL
SOURCE SOURCE: NORMAL

## 2022-04-22 NOTE — PROGRESS NOTES
CHW Vernell called patient via TC and patient is not accepting calls at this time. CHW unable to leave contact information. CHW Vernell will discharge patient from [facility] and remove from case load and master list as all goals were met bedside.

## 2022-04-24 LAB
BACTERIA BLD CULT: NORMAL
BACTERIA BLD CULT: NORMAL
SIGNIFICANT IND 70042: NORMAL
SIGNIFICANT IND 70042: NORMAL
SITE SITE: NORMAL
SITE SITE: NORMAL
SOURCE SOURCE: NORMAL
SOURCE SOURCE: NORMAL

## 2022-06-11 ENCOUNTER — HOSPITAL ENCOUNTER (EMERGENCY)
Facility: MEDICAL CENTER | Age: 27
End: 2022-06-12
Attending: EMERGENCY MEDICINE | Admitting: STUDENT IN AN ORGANIZED HEALTH CARE EDUCATION/TRAINING PROGRAM
Payer: MEDICAID

## 2022-06-11 ENCOUNTER — APPOINTMENT (OUTPATIENT)
Dept: RADIOLOGY | Facility: MEDICAL CENTER | Age: 27
End: 2022-06-11
Attending: EMERGENCY MEDICINE
Payer: MEDICAID

## 2022-06-11 LAB
BASOPHILS # BLD AUTO: 0.3 % (ref 0–1.8)
BASOPHILS # BLD: 0.05 K/UL (ref 0–0.12)
EOSINOPHIL # BLD AUTO: 0 K/UL (ref 0–0.51)
EOSINOPHIL NFR BLD: 0 % (ref 0–6.9)
ERYTHROCYTE [DISTWIDTH] IN BLOOD BY AUTOMATED COUNT: 42.5 FL (ref 35.9–50)
HCT VFR BLD AUTO: 43.8 % (ref 42–52)
HGB BLD-MCNC: 14.6 G/DL (ref 14–18)
IMM GRANULOCYTES # BLD AUTO: 0.06 K/UL (ref 0–0.11)
IMM GRANULOCYTES NFR BLD AUTO: 0.4 % (ref 0–0.9)
LACTATE BLD-SCNC: 3 MMOL/L (ref 0.5–2)
LYMPHOCYTES # BLD AUTO: 0.71 K/UL (ref 1–4.8)
LYMPHOCYTES NFR BLD: 4.9 % (ref 22–41)
MCH RBC QN AUTO: 29.6 PG (ref 27–33)
MCHC RBC AUTO-ENTMCNC: 33.3 G/DL (ref 33.7–35.3)
MCV RBC AUTO: 88.7 FL (ref 81.4–97.8)
MONOCYTES # BLD AUTO: 0.96 K/UL (ref 0–0.85)
MONOCYTES NFR BLD AUTO: 6.6 % (ref 0–13.4)
NEUTROPHILS # BLD AUTO: 12.78 K/UL (ref 1.82–7.42)
NEUTROPHILS NFR BLD: 87.8 % (ref 44–72)
NRBC # BLD AUTO: 0 K/UL
NRBC BLD-RTO: 0 /100 WBC
PLATELET # BLD AUTO: 221 K/UL (ref 164–446)
PMV BLD AUTO: 11.3 FL (ref 9–12.9)
RBC # BLD AUTO: 4.94 M/UL (ref 4.7–6.1)
WBC # BLD AUTO: 14.6 K/UL (ref 4.8–10.8)

## 2022-06-11 PROCEDURE — 87040 BLOOD CULTURE FOR BACTERIA: CPT

## 2022-06-11 PROCEDURE — A9270 NON-COVERED ITEM OR SERVICE: HCPCS | Performed by: EMERGENCY MEDICINE

## 2022-06-11 PROCEDURE — C9803 HOPD COVID-19 SPEC COLLECT: HCPCS | Performed by: EMERGENCY MEDICINE

## 2022-06-11 PROCEDURE — 700105 HCHG RX REV CODE 258: Performed by: EMERGENCY MEDICINE

## 2022-06-11 PROCEDURE — 0241U HCHG SARS-COV-2 COVID-19 NFCT DS RESP RNA 4 TRGT MIC: CPT

## 2022-06-11 PROCEDURE — 83605 ASSAY OF LACTIC ACID: CPT

## 2022-06-11 PROCEDURE — 700102 HCHG RX REV CODE 250 W/ 637 OVERRIDE(OP): Performed by: EMERGENCY MEDICINE

## 2022-06-11 PROCEDURE — 85025 COMPLETE CBC W/AUTO DIFF WBC: CPT

## 2022-06-11 PROCEDURE — 36415 COLL VENOUS BLD VENIPUNCTURE: CPT

## 2022-06-11 PROCEDURE — 99285 EMERGENCY DEPT VISIT HI MDM: CPT

## 2022-06-11 RX ORDER — IBUPROFEN 600 MG/1
600 TABLET ORAL ONCE
Status: COMPLETED | OUTPATIENT
Start: 2022-06-11 | End: 2022-06-11

## 2022-06-11 RX ORDER — ACETAMINOPHEN 500 MG
1000 TABLET ORAL ONCE
Status: COMPLETED | OUTPATIENT
Start: 2022-06-11 | End: 2022-06-11

## 2022-06-11 RX ORDER — SODIUM CHLORIDE, SODIUM LACTATE, POTASSIUM CHLORIDE, CALCIUM CHLORIDE 600; 310; 30; 20 MG/100ML; MG/100ML; MG/100ML; MG/100ML
1000 INJECTION, SOLUTION INTRAVENOUS ONCE
Status: COMPLETED | OUTPATIENT
Start: 2022-06-11 | End: 2022-06-12

## 2022-06-11 RX ADMIN — IBUPROFEN 600 MG: 600 TABLET, FILM COATED ORAL at 23:14

## 2022-06-11 RX ADMIN — SODIUM CHLORIDE, POTASSIUM CHLORIDE, SODIUM LACTATE AND CALCIUM CHLORIDE 1000 ML: 600; 310; 30; 20 INJECTION, SOLUTION INTRAVENOUS at 23:14

## 2022-06-11 RX ADMIN — ACETAMINOPHEN 1000 MG: 500 TABLET, FILM COATED ORAL at 23:14

## 2022-06-11 ASSESSMENT — FIBROSIS 4 INDEX: FIB4 SCORE: 0.77

## 2022-06-12 ENCOUNTER — HOSPITAL ENCOUNTER (INPATIENT)
Facility: MEDICAL CENTER | Age: 27
LOS: 2 days | DRG: 872 | End: 2022-06-14
Attending: NEUROLOGICAL SURGERY | Admitting: STUDENT IN AN ORGANIZED HEALTH CARE EDUCATION/TRAINING PROGRAM
Payer: MEDICAID

## 2022-06-12 VITALS
RESPIRATION RATE: 32 BRPM | HEART RATE: 71 BPM | DIASTOLIC BLOOD PRESSURE: 68 MMHG | BODY MASS INDEX: 46.65 KG/M2 | WEIGHT: 315 LBS | OXYGEN SATURATION: 97 % | HEIGHT: 69 IN | SYSTOLIC BLOOD PRESSURE: 129 MMHG | TEMPERATURE: 98.8 F

## 2022-06-12 DIAGNOSIS — L03.116 CELLULITIS OF LEFT FOOT: ICD-10-CM

## 2022-06-12 PROBLEM — A41.9 SEPSIS (HCC): Status: ACTIVE | Noted: 2022-06-12

## 2022-06-12 PROBLEM — N17.9 SEPSIS WITH ACUTE RENAL FAILURE WITHOUT SEPTIC SHOCK (HCC): Status: ACTIVE | Noted: 2022-06-12

## 2022-06-12 PROBLEM — E66.01 CLASS 3 SEVERE OBESITY DUE TO EXCESS CALORIES WITH SERIOUS COMORBIDITY AND BODY MASS INDEX (BMI) OF 50.0 TO 59.9 IN ADULT (HCC): Status: ACTIVE | Noted: 2017-11-28

## 2022-06-12 PROBLEM — G47.34 NOCTURNAL HYPOXEMIA: Status: ACTIVE | Noted: 2022-06-12

## 2022-06-12 PROBLEM — R65.20 SEPSIS WITH ACUTE RENAL FAILURE WITHOUT SEPTIC SHOCK (HCC): Status: ACTIVE | Noted: 2022-06-12

## 2022-06-12 PROBLEM — E87.6 HYPOKALEMIA: Status: ACTIVE | Noted: 2022-06-12

## 2022-06-12 LAB
ALBUMIN SERPL BCP-MCNC: 3.9 G/DL (ref 3.2–4.9)
ALBUMIN/GLOB SERPL: 1.4 G/DL
ALP SERPL-CCNC: 90 U/L (ref 30–99)
ALT SERPL-CCNC: 16 U/L (ref 2–50)
AMPHET UR QL SCN: NEGATIVE
ANION GAP SERPL CALC-SCNC: 10 MMOL/L (ref 7–16)
ANION GAP SERPL CALC-SCNC: 15 MMOL/L (ref 7–16)
APPEARANCE UR: CLEAR
AST SERPL-CCNC: 22 U/L (ref 12–45)
BACTERIA #/AREA URNS HPF: NEGATIVE /HPF
BARBITURATES UR QL SCN: NEGATIVE
BENZODIAZ UR QL SCN: NEGATIVE
BILIRUB SERPL-MCNC: 1.8 MG/DL (ref 0.1–1.5)
BILIRUB UR QL STRIP.AUTO: NEGATIVE
BUN SERPL-MCNC: 20 MG/DL (ref 8–22)
BUN SERPL-MCNC: 20 MG/DL (ref 8–22)
BZE UR QL SCN: NEGATIVE
CALCIUM SERPL-MCNC: 8.9 MG/DL (ref 8.5–10.5)
CALCIUM SERPL-MCNC: 9 MG/DL (ref 8.5–10.5)
CANNABINOIDS UR QL SCN: NEGATIVE
CHLORIDE SERPL-SCNC: 104 MMOL/L (ref 96–112)
CHLORIDE SERPL-SCNC: 105 MMOL/L (ref 96–112)
CO2 SERPL-SCNC: 17 MMOL/L (ref 20–33)
CO2 SERPL-SCNC: 24 MMOL/L (ref 20–33)
COLOR UR: ABNORMAL
CREAT SERPL-MCNC: 1.12 MG/DL (ref 0.5–1.4)
CREAT SERPL-MCNC: 1.25 MG/DL (ref 0.5–1.4)
EPI CELLS #/AREA URNS HPF: ABNORMAL /HPF
FLUAV RNA SPEC QL NAA+PROBE: NEGATIVE
FLUBV RNA SPEC QL NAA+PROBE: NEGATIVE
GFR SERPLBLD CREATININE-BSD FMLA CKD-EPI: 80 ML/MIN/1.73 M 2
GFR SERPLBLD CREATININE-BSD FMLA CKD-EPI: 91 ML/MIN/1.73 M 2
GLOBULIN SER CALC-MCNC: 2.8 G/DL (ref 1.9–3.5)
GLUCOSE SERPL-MCNC: 122 MG/DL (ref 65–99)
GLUCOSE SERPL-MCNC: 141 MG/DL (ref 65–99)
GLUCOSE UR STRIP.AUTO-MCNC: NEGATIVE MG/DL
KETONES UR STRIP.AUTO-MCNC: ABNORMAL MG/DL
LACTATE BLD-SCNC: 1.4 MMOL/L (ref 0.5–2)
LACTATE BLD-SCNC: 1.9 MMOL/L (ref 0.5–2)
LEUKOCYTE ESTERASE UR QL STRIP.AUTO: NEGATIVE
MAGNESIUM SERPL-MCNC: 1.7 MG/DL (ref 1.5–2.5)
METHADONE UR QL SCN: NEGATIVE
MICRO URNS: ABNORMAL
NITRITE UR QL STRIP.AUTO: NEGATIVE
OPIATES UR QL SCN: NEGATIVE
OXYCODONE UR QL SCN: NEGATIVE
PCP UR QL SCN: NEGATIVE
PH UR STRIP.AUTO: 5 [PH] (ref 5–8)
POTASSIUM SERPL-SCNC: 2.9 MMOL/L (ref 3.6–5.5)
POTASSIUM SERPL-SCNC: 3.6 MMOL/L (ref 3.6–5.5)
PROCALCITONIN SERPL-MCNC: 5.93 NG/ML
PROPOXYPH UR QL SCN: NEGATIVE
PROT SERPL-MCNC: 6.7 G/DL (ref 6–8.2)
PROT UR QL STRIP: >=1000 MG/DL
RBC # URNS HPF: ABNORMAL /HPF
RBC UR QL AUTO: ABNORMAL
RSV RNA SPEC QL NAA+PROBE: NEGATIVE
SARS-COV-2 RNA RESP QL NAA+PROBE: NOTDETECTED
SCCMEC + MECA PNL NOSE NAA+PROBE: NEGATIVE
SODIUM SERPL-SCNC: 136 MMOL/L (ref 135–145)
SODIUM SERPL-SCNC: 139 MMOL/L (ref 135–145)
SP GR UR STRIP.AUTO: 1.02
SPECIMEN SOURCE: NORMAL
UROBILINOGEN UR STRIP.AUTO-MCNC: 1 MG/DL
WBC #/AREA URNS HPF: ABNORMAL /HPF

## 2022-06-12 PROCEDURE — 94760 N-INVAS EAR/PLS OXIMETRY 1: CPT

## 2022-06-12 PROCEDURE — 71045 X-RAY EXAM CHEST 1 VIEW: CPT

## 2022-06-12 PROCEDURE — 83605 ASSAY OF LACTIC ACID: CPT

## 2022-06-12 PROCEDURE — 96372 THER/PROPH/DIAG INJ SC/IM: CPT

## 2022-06-12 PROCEDURE — 80048 BASIC METABOLIC PNL TOTAL CA: CPT

## 2022-06-12 PROCEDURE — 87086 URINE CULTURE/COLONY COUNT: CPT

## 2022-06-12 PROCEDURE — 99223 1ST HOSP IP/OBS HIGH 75: CPT | Mod: AI | Performed by: STUDENT IN AN ORGANIZED HEALTH CARE EDUCATION/TRAINING PROGRAM

## 2022-06-12 PROCEDURE — 770006 HCHG ROOM/CARE - MED/SURG/GYN SEMI*

## 2022-06-12 PROCEDURE — A9270 NON-COVERED ITEM OR SERVICE: HCPCS | Performed by: EMERGENCY MEDICINE

## 2022-06-12 PROCEDURE — 87641 MR-STAPH DNA AMP PROBE: CPT

## 2022-06-12 PROCEDURE — 80053 COMPREHEN METABOLIC PANEL: CPT

## 2022-06-12 PROCEDURE — 700105 HCHG RX REV CODE 258: Performed by: EMERGENCY MEDICINE

## 2022-06-12 PROCEDURE — 700111 HCHG RX REV CODE 636 W/ 250 OVERRIDE (IP): Performed by: EMERGENCY MEDICINE

## 2022-06-12 PROCEDURE — 700102 HCHG RX REV CODE 250 W/ 637 OVERRIDE(OP): Performed by: EMERGENCY MEDICINE

## 2022-06-12 PROCEDURE — 81001 URINALYSIS AUTO W/SCOPE: CPT | Mod: XU

## 2022-06-12 PROCEDURE — A9270 NON-COVERED ITEM OR SERVICE: HCPCS | Performed by: STUDENT IN AN ORGANIZED HEALTH CARE EDUCATION/TRAINING PROGRAM

## 2022-06-12 PROCEDURE — 700105 HCHG RX REV CODE 258: Performed by: STUDENT IN AN ORGANIZED HEALTH CARE EDUCATION/TRAINING PROGRAM

## 2022-06-12 PROCEDURE — 700102 HCHG RX REV CODE 250 W/ 637 OVERRIDE(OP): Performed by: STUDENT IN AN ORGANIZED HEALTH CARE EDUCATION/TRAINING PROGRAM

## 2022-06-12 PROCEDURE — 700111 HCHG RX REV CODE 636 W/ 250 OVERRIDE (IP): Performed by: STUDENT IN AN ORGANIZED HEALTH CARE EDUCATION/TRAINING PROGRAM

## 2022-06-12 PROCEDURE — 84145 PROCALCITONIN (PCT): CPT

## 2022-06-12 PROCEDURE — 36415 COLL VENOUS BLD VENIPUNCTURE: CPT

## 2022-06-12 PROCEDURE — 700111 HCHG RX REV CODE 636 W/ 250 OVERRIDE (IP): Performed by: INTERNAL MEDICINE

## 2022-06-12 PROCEDURE — 80307 DRUG TEST PRSMV CHEM ANLYZR: CPT

## 2022-06-12 PROCEDURE — 700105 HCHG RX REV CODE 258: Performed by: INTERNAL MEDICINE

## 2022-06-12 PROCEDURE — 96365 THER/PROPH/DIAG IV INF INIT: CPT

## 2022-06-12 PROCEDURE — 83735 ASSAY OF MAGNESIUM: CPT

## 2022-06-12 RX ORDER — TOPIRAMATE 25 MG/1
50 TABLET ORAL EVERY EVENING
Status: DISCONTINUED | OUTPATIENT
Start: 2022-06-12 | End: 2022-06-12

## 2022-06-12 RX ORDER — HYDRALAZINE HYDROCHLORIDE 20 MG/ML
10 INJECTION INTRAMUSCULAR; INTRAVENOUS EVERY 4 HOURS PRN
Status: DISCONTINUED | OUTPATIENT
Start: 2022-06-12 | End: 2022-06-14 | Stop reason: HOSPADM

## 2022-06-12 RX ORDER — CHOLECALCIFEROL (VITAMIN D3) 125 MCG
5 CAPSULE ORAL NIGHTLY PRN
Status: DISCONTINUED | OUTPATIENT
Start: 2022-06-12 | End: 2022-06-14 | Stop reason: HOSPADM

## 2022-06-12 RX ORDER — CEFAZOLIN SODIUM 2 G/100ML
2000 INJECTION, SOLUTION INTRAVENOUS ONCE
Status: COMPLETED | OUTPATIENT
Start: 2022-06-12 | End: 2022-06-12

## 2022-06-12 RX ORDER — ENOXAPARIN SODIUM 100 MG/ML
40 INJECTION SUBCUTANEOUS DAILY
Status: DISCONTINUED | OUTPATIENT
Start: 2022-06-12 | End: 2022-06-14 | Stop reason: HOSPADM

## 2022-06-12 RX ORDER — TOPIRAMATE 25 MG/1
50 TABLET ORAL EVERY EVENING
Status: DISCONTINUED | OUTPATIENT
Start: 2022-06-12 | End: 2022-06-12 | Stop reason: HOSPADM

## 2022-06-12 RX ORDER — POLYETHYLENE GLYCOL 3350 17 G/17G
1 POWDER, FOR SOLUTION ORAL
Status: DISCONTINUED | OUTPATIENT
Start: 2022-06-12 | End: 2022-06-14 | Stop reason: HOSPADM

## 2022-06-12 RX ORDER — DIPHENHYDRAMINE HCL 25 MG
25 TABLET ORAL EVERY 6 HOURS PRN
Status: DISCONTINUED | OUTPATIENT
Start: 2022-06-12 | End: 2022-06-14 | Stop reason: HOSPADM

## 2022-06-12 RX ORDER — POTASSIUM CHLORIDE 20 MEQ/1
40 TABLET, EXTENDED RELEASE ORAL ONCE
Status: COMPLETED | OUTPATIENT
Start: 2022-06-12 | End: 2022-06-12

## 2022-06-12 RX ORDER — POLYETHYLENE GLYCOL 3350 17 G/17G
1 POWDER, FOR SOLUTION ORAL
Status: DISCONTINUED | OUTPATIENT
Start: 2022-06-12 | End: 2022-06-12 | Stop reason: HOSPADM

## 2022-06-12 RX ORDER — ATORVASTATIN CALCIUM 20 MG/1
20 TABLET, FILM COATED ORAL EVERY MORNING
Status: DISCONTINUED | OUTPATIENT
Start: 2022-06-12 | End: 2022-06-12 | Stop reason: HOSPADM

## 2022-06-12 RX ORDER — SODIUM CHLORIDE, SODIUM LACTATE, POTASSIUM CHLORIDE, CALCIUM CHLORIDE 600; 310; 30; 20 MG/100ML; MG/100ML; MG/100ML; MG/100ML
INJECTION, SOLUTION INTRAVENOUS CONTINUOUS
Status: DISCONTINUED | OUTPATIENT
Start: 2022-06-12 | End: 2022-06-12 | Stop reason: HOSPADM

## 2022-06-12 RX ORDER — BISACODYL 10 MG
10 SUPPOSITORY, RECTAL RECTAL
Status: DISCONTINUED | OUTPATIENT
Start: 2022-06-12 | End: 2022-06-12 | Stop reason: HOSPADM

## 2022-06-12 RX ORDER — CHOLECALCIFEROL (VITAMIN D3) 125 MCG
5 CAPSULE ORAL NIGHTLY PRN
Status: DISCONTINUED | OUTPATIENT
Start: 2022-06-12 | End: 2022-06-12 | Stop reason: HOSPADM

## 2022-06-12 RX ORDER — SODIUM CHLORIDE, SODIUM LACTATE, POTASSIUM CHLORIDE, CALCIUM CHLORIDE 600; 310; 30; 20 MG/100ML; MG/100ML; MG/100ML; MG/100ML
INJECTION, SOLUTION INTRAVENOUS CONTINUOUS
Status: DISCONTINUED | OUTPATIENT
Start: 2022-06-12 | End: 2022-06-13

## 2022-06-12 RX ORDER — AMOXICILLIN 250 MG
2 CAPSULE ORAL 2 TIMES DAILY
Status: DISCONTINUED | OUTPATIENT
Start: 2022-06-12 | End: 2022-06-12 | Stop reason: HOSPADM

## 2022-06-12 RX ORDER — BISACODYL 10 MG
10 SUPPOSITORY, RECTAL RECTAL
Status: DISCONTINUED | OUTPATIENT
Start: 2022-06-12 | End: 2022-06-14 | Stop reason: HOSPADM

## 2022-06-12 RX ORDER — ATORVASTATIN CALCIUM 20 MG/1
20 TABLET, FILM COATED ORAL EVERY MORNING
Status: DISCONTINUED | OUTPATIENT
Start: 2022-06-13 | End: 2022-06-12

## 2022-06-12 RX ORDER — AMOXICILLIN 250 MG
2 CAPSULE ORAL 2 TIMES DAILY
Status: DISCONTINUED | OUTPATIENT
Start: 2022-06-12 | End: 2022-06-14 | Stop reason: HOSPADM

## 2022-06-12 RX ORDER — CEFAZOLIN SODIUM 2 G/100ML
2 INJECTION, SOLUTION INTRAVENOUS EVERY 8 HOURS
Status: DISCONTINUED | OUTPATIENT
Start: 2022-06-12 | End: 2022-06-12

## 2022-06-12 RX ORDER — HYDRALAZINE HYDROCHLORIDE 20 MG/ML
10 INJECTION INTRAMUSCULAR; INTRAVENOUS EVERY 4 HOURS PRN
Status: DISCONTINUED | OUTPATIENT
Start: 2022-06-12 | End: 2022-06-12 | Stop reason: HOSPADM

## 2022-06-12 RX ORDER — SODIUM CHLORIDE, SODIUM LACTATE, POTASSIUM CHLORIDE, CALCIUM CHLORIDE 600; 310; 30; 20 MG/100ML; MG/100ML; MG/100ML; MG/100ML
1000 INJECTION, SOLUTION INTRAVENOUS ONCE
Status: COMPLETED | OUTPATIENT
Start: 2022-06-12 | End: 2022-06-12

## 2022-06-12 RX ORDER — ACETAMINOPHEN 325 MG/1
650 TABLET ORAL EVERY 6 HOURS PRN
Status: DISCONTINUED | OUTPATIENT
Start: 2022-06-12 | End: 2022-06-12 | Stop reason: HOSPADM

## 2022-06-12 RX ORDER — ENOXAPARIN SODIUM 100 MG/ML
40 INJECTION SUBCUTANEOUS DAILY
Status: DISCONTINUED | OUTPATIENT
Start: 2022-06-12 | End: 2022-06-12 | Stop reason: HOSPADM

## 2022-06-12 RX ORDER — CARBAMAZEPINE 200 MG/1
200 TABLET ORAL 2 TIMES DAILY
Status: DISCONTINUED | OUTPATIENT
Start: 2022-06-12 | End: 2022-06-12 | Stop reason: HOSPADM

## 2022-06-12 RX ORDER — POTASSIUM CHLORIDE 20 MEQ/1
40 TABLET, EXTENDED RELEASE ORAL ONCE
Status: DISCONTINUED | OUTPATIENT
Start: 2022-06-12 | End: 2022-06-12 | Stop reason: HOSPADM

## 2022-06-12 RX ORDER — ACETAMINOPHEN 325 MG/1
650 TABLET ORAL EVERY 6 HOURS PRN
Status: DISCONTINUED | OUTPATIENT
Start: 2022-06-12 | End: 2022-06-14 | Stop reason: HOSPADM

## 2022-06-12 RX ORDER — DIPHENHYDRAMINE HCL 25 MG
25 TABLET ORAL EVERY 6 HOURS PRN
Status: DISCONTINUED | OUTPATIENT
Start: 2022-06-12 | End: 2022-06-12 | Stop reason: HOSPADM

## 2022-06-12 RX ORDER — CARBAMAZEPINE 200 MG/1
200 TABLET ORAL 2 TIMES DAILY
Status: DISCONTINUED | OUTPATIENT
Start: 2022-06-12 | End: 2022-06-12

## 2022-06-12 RX ADMIN — ENOXAPARIN SODIUM 40 MG: 40 INJECTION SUBCUTANEOUS at 17:57

## 2022-06-12 RX ADMIN — SODIUM CHLORIDE 3 G: 900 INJECTION INTRAVENOUS at 14:47

## 2022-06-12 RX ADMIN — SODIUM CHLORIDE 3 G: 900 INJECTION INTRAVENOUS at 17:57

## 2022-06-12 RX ADMIN — SODIUM CHLORIDE, POTASSIUM CHLORIDE, SODIUM LACTATE AND CALCIUM CHLORIDE: 600; 310; 30; 20 INJECTION, SOLUTION INTRAVENOUS at 02:45

## 2022-06-12 RX ADMIN — CARBAMAZEPINE 200 MG: 200 TABLET ORAL at 06:00

## 2022-06-12 RX ADMIN — POTASSIUM CHLORIDE 40 MEQ: 20 TABLET, EXTENDED RELEASE ORAL at 01:45

## 2022-06-12 RX ADMIN — SODIUM CHLORIDE, POTASSIUM CHLORIDE, SODIUM LACTATE AND CALCIUM CHLORIDE 1000 ML: 600; 310; 30; 20 INJECTION, SOLUTION INTRAVENOUS at 01:45

## 2022-06-12 RX ADMIN — SENNOSIDES AND DOCUSATE SODIUM 2 TABLET: 50; 8.6 TABLET ORAL at 06:00

## 2022-06-12 RX ADMIN — ATORVASTATIN CALCIUM 20 MG: 20 TABLET, FILM COATED ORAL at 06:00

## 2022-06-12 RX ADMIN — ENOXAPARIN SODIUM 40 MG: 40 INJECTION SUBCUTANEOUS at 02:45

## 2022-06-12 RX ADMIN — SODIUM CHLORIDE 3 G: 900 INJECTION INTRAVENOUS at 23:57

## 2022-06-12 RX ADMIN — SODIUM CHLORIDE, POTASSIUM CHLORIDE, SODIUM LACTATE AND CALCIUM CHLORIDE: 600; 310; 30; 20 INJECTION, SOLUTION INTRAVENOUS at 14:40

## 2022-06-12 RX ADMIN — CEFAZOLIN 2000 MG: 10 INJECTION, POWDER, FOR SOLUTION INTRAVENOUS at 01:45

## 2022-06-12 ASSESSMENT — ENCOUNTER SYMPTOMS
NAUSEA: 0
FEVER: 1
ABDOMINAL PAIN: 0
SHORTNESS OF BREATH: 0
VOMITING: 0
DIARRHEA: 0
DIZZINESS: 0
DEPRESSION: 0
NERVOUS/ANXIOUS: 1
SHORTNESS OF BREATH: 0
NAUSEA: 0
HEADACHES: 0
CHILLS: 0
CHILLS: 0
ABDOMINAL PAIN: 0
NERVOUS/ANXIOUS: 1
FEVER: 0
MYALGIAS: 0
VOMITING: 0
HEADACHES: 0
SORE THROAT: 0
COUGH: 0
DIZZINESS: 0

## 2022-06-12 ASSESSMENT — PAIN DESCRIPTION - PAIN TYPE
TYPE: CHRONIC PAIN
TYPE: CHRONIC PAIN;ACUTE PAIN

## 2022-06-12 ASSESSMENT — LIFESTYLE VARIABLES
HAVE PEOPLE ANNOYED YOU BY CRITICIZING YOUR DRINKING: NO
TOTAL SCORE: 0
TOTAL SCORE: 0
HAVE YOU EVER FELT YOU SHOULD CUT DOWN ON YOUR DRINKING: NO
HOW MANY TIMES IN THE PAST YEAR HAVE YOU HAD 5 OR MORE DRINKS IN A DAY: 0
TOTAL SCORE: 0
ALCOHOL_USE: YES
ON A TYPICAL DAY WHEN YOU DRINK ALCOHOL HOW MANY DRINKS DO YOU HAVE: 1
EVER HAD A DRINK FIRST THING IN THE MORNING TO STEADY YOUR NERVES TO GET RID OF A HANGOVER: NO
AVERAGE NUMBER OF DAYS PER WEEK YOU HAVE A DRINK CONTAINING ALCOHOL: 1
CONSUMPTION TOTAL: NEGATIVE
EVER FELT BAD OR GUILTY ABOUT YOUR DRINKING: NO

## 2022-06-12 ASSESSMENT — COGNITIVE AND FUNCTIONAL STATUS - GENERAL
MOBILITY SCORE: 24
SUGGESTED CMS G CODE MODIFIER MOBILITY: CH
SUGGESTED CMS G CODE MODIFIER DAILY ACTIVITY: CH
DAILY ACTIVITIY SCORE: 24

## 2022-06-12 ASSESSMENT — PATIENT HEALTH QUESTIONNAIRE - PHQ9
SUM OF ALL RESPONSES TO PHQ9 QUESTIONS 1 AND 2: 0
2. FEELING DOWN, DEPRESSED, IRRITABLE, OR HOPELESS: NOT AT ALL
1. LITTLE INTEREST OR PLEASURE IN DOING THINGS: NOT AT ALL

## 2022-06-12 ASSESSMENT — FIBROSIS 4 INDEX: FIB4 SCORE: 0.7

## 2022-06-12 NOTE — ED NOTES
Pt upset he was asked if he uses street drugs. Pt states he is going to alvaro staff for being asked. Pt advised this is a question we ask everyone.

## 2022-06-12 NOTE — ASSESSMENT & PLAN NOTE
Left lower extremity cellulitis, symptoms for the past 2 days  Last admission to this facility 04/22 with left lower extremity cellulitis as well.  Wound cultures at that time grew MSSA, bacteremic at the time grew group B strep  Change abx unasyn and vanc in light of worsening erythema  MRSA screen pending

## 2022-06-12 NOTE — ASSESSMENT & PLAN NOTE
This is Sepsis Present on admission  SIRS criteria identified on my evaluation include: Fever, with temperature greater than 101 deg F, Tachycardia, with heart rate greater than 90 BPM, Tachypnea, with respirations greater than 20 per minute and Leukocytosis, with WBC greater than 12,000  Source is left lower extremity cellulitis  Sepsis protocol initiated  Fluid resuscitation ordered per protocol  Crystalloid Fluid Administration: Fluid resuscitation ordered per standard protocol - 30 mL/kg per current or ideal body weight  IV antibiotics as appropriate for source of sepsis  Reassessment: I have reassessed the patient's hemodynamic status

## 2022-06-12 NOTE — ED PROVIDER NOTES
ED Provider Note    CHIEF COMPLAINT  Chief Complaint   Patient presents with   • Fever   • Rash     L lower leg with swelling   • Shortness of Breath     Started today       HPI  Adama Burt III is a 26 y.o. male who presents to the emergency department chief complaint of fevers body aches feeling fatigued and dehydrated.  Patient states he has not peed in over 24 hours he states he noted a mild rash to his left lower leg today.  He feels super tired he developed a fever this morning but never took anything for his symptoms.  He is overdue for his COVID-vaccine he has not had his influenza he is felt a little short of breath chest because he felt hot but no actual chest pain or cough or nasal congestion.  No abdominal pain no nausea vomiting diarrhea bloody stools bloody emesis he just denies any is been able to urinate in the last day or so.  He denies any new or worsening leg swelling    REVIEW OF SYSTEMS  Positives as above. Pertinent negatives include nausea vomiting headache neck stiffness cough congestion chest pain abdominal pain diarrhea constipation dysuria hematuria flank pain  All other review of systems are negative    PAST MEDICAL HISTORY   has a past medical history of ADHD (attention deficit hyperactivity disorder), Apnea, sleep, Bipolar 1 disorder (HCC), Blood clotting disorder (HCC), Breath shortness, Daytime sleepiness, Gasping for breath, High cholesterol, Hypertension, Obesity, ODD (oppositional defiant disorder), Pneumonia (04/2021), Sleep apnea, and Snoring.    SOCIAL HISTORY  Social History     Tobacco Use   • Smoking status: Never Smoker   • Smokeless tobacco: Never Used   Vaping Use   • Vaping Use: Never used   Substance and Sexual Activity   • Alcohol use: Yes     Comment: rare    • Drug use: No   • Sexual activity: Not on file       SURGICAL HISTORY   has a past surgical history that includes appendectomy child; tonsillectomy (3/17/2010); other; and lap, pieter restrict proc,  "longitudinal gas* (N/A, 10/29/2021).    CURRENT MEDICATIONS  Home Medications     Reviewed by Geetha Shultz R.N. (Registered Nurse) on 06/11/22 at 2204  Med List Status: Not Addressed   Medication Last Dose Status   acetaminophen (TYLENOL) 500 MG Tab  Active   ammonium lactate (LAC-HYDRIN) 12 % Lotion  Active   aripiprazole (ABILIFY) 30 MG tablet  Active   atorvastatin (LIPITOR) 20 MG Tab  Active   carBAMazepine (TEGRETOL) 200 MG Tab  Active   ibuprofen (MOTRIN) 600 MG Tab  Active   metFORMIN ER (GLUCOPHAGE XR) 750 MG TABLET SR 24 HR  Active   nystatin (MYCOSTATIN) powder  Active   topiramate (TOPAMAX) 50 MG tablet  Active   traZODone (DESYREL) 50 MG Tab  Active                ALLERGIES  No Known Allergies    PHYSICAL EXAM  VITAL SIGNS: /64   Pulse (!) 122   Temp (!) 39.7 °C (103.5 °F) (Oral)   Resp (!) 24   Ht 1.753 m (5' 9\")   Wt (!) 169 kg (372 lb 9.2 oz)   SpO2 94%   BMI 55.02 kg/m²    Pulse ox interpretation: I interpret this pulse ox as normal.  Constitutional: Alert in moderate distress, writhing on the bed  HENT: Normocephalic atraumatic, dry mucous membrane  Eyes: PER, Conjunctiva normal, Non-icteric.   Neck: Normal range of motion, No tenderness, Supple, No stridor.   Cardiovascular: Regular rhythm tachycardic no murmurs.   Thorax & Lungs: Normal breath sounds, No respiratory distress, No wheezing, No chest tenderness.   Abdomen: Bowel sounds normal, Soft, No tenderness, No pulsatile masses. No peritoneal signs.  Skin: Hot to touch dry,   Left lower extremity over the lower distal leg there is an erythematous area warm to the touch mildly indurated without fluctuance  Looks appears to be sunburn to the upper back and the upper right chest  Back: No bony tenderness, No CVA tenderness.   Extremities/MSK: Intact equal distal pulses, No edema, No tenderness, No cyanosis, no major deformities noted  Neurologic: Alert and oriented x3, No focal deficits noted.       DIFFERENTIAL DIAGNOSIS AND WORK " "UP PLAN    This is a 26 y.o. male who presents with meaning positive criteria for SIRS or sepsis, concerning for possible source of either urinary tract infection of the cellulitis in the left lower leg although its not really significant is not blistering I doubt necrotizing fasciitis its not extremely tender to palpation on my examination.  The rest of his skin does not show any other signs of abscess or cellulitis.  His lungs are clear although he was a little tachypneic and more agitated on my exam.  He denies that he is been using amphetamines although the way he is appearing is why I inquired due to his agitation.  He will be treated with some IV fluids due to dehydration as well as antipyretics I will evaluate for a viral pathology such as COVID or influenza    DIAGNOSTIC STUDIES / PROCEDURES    LABS  Pertinent Lab Findings    Labs Reviewed   LACTIC ACID - Abnormal; Notable for the following components:       Result Value    Lactic Acid 3.0 (*)     All other components within normal limits   CBC WITH DIFFERENTIAL - Abnormal; Notable for the following components:    WBC 14.6 (*)     MCHC 33.3 (*)     Neutrophils-Polys 87.80 (*)     Lymphocytes 4.90 (*)     Neutrophils (Absolute) 12.78 (*)     Lymphs (Absolute) 0.71 (*)     Monos (Absolute) 0.96 (*)     All other components within normal limits   COMP METABOLIC PANEL - Abnormal; Notable for the following components:    Potassium 2.9 (*)     Co2 17 (*)     Glucose 141 (*)     Total Bilirubin 1.8 (*)     All other components within normal limits    Narrative:     Recollect: Specimen hemolyzed.  Notified: RN [id number]   LACTIC ACID    Narrative:     Repeat if initial lactic acid result is greater than 2   COV-2, FLU A/B, AND RSV BY PCR (CEPBdayID)   ESTIMATED GFR    Narrative:     Recollect: Specimen hemolyzed.  Notified: RN [id number]   LACTIC ACID   URINALYSIS   URINE CULTURE(NEW)   BLOOD CULTURE    Narrative:     Per Hospital Policy: Only change Specimen Src: to \"Line\" " "if  specified by physician order.   BLOOD CULTURE    Narrative:     Per Hospital Policy: Only change Specimen Src: to \"Line\" if  specified by physician order.       RADIOLOGY  DX-CHEST-PORTABLE (1 VIEW)   Final Result      No radiographic evidence of acute cardiopulmonary process.        The radiologist's interpretation of all radiological studies have been reviewed by me.      COURSE & MEDICAL DECISION MAKING  Pertinent Labs & Imaging studies reviewed. (See chart for details)      1:21 AM  I reassessed patient at the bedside he is having a positive sponsor IV fluids but states that he still cannot pee and were going to have to \"straight cath him\".  He received IV antibiotics for the cellulitis of his left lower extremity but his lungs are otherwise clear he is not hypoxic nor tachypneic his heart rate and vital signs of all improved after ibuprofen Tylenol he is effervescing but is not completely defervesced.  He will be hospitalized for further management including blood culture results urinalysis treatment continued IV fluids and antipyretics as his viral pathogens are within normal limits.      I spoke with Dr. Bravo with the hospitalist service and he has accepted the patient for hospitalization    CRITICAL CARE  The very real possibilty of a deterioration of this patient's condition required the highest level of my preparedness for sudden, emergent intervention.  I provided critical care services, which included medication orders, frequent reevaluations of the patient's condition and response to treatment, ordering and reviewing test results, and discussing the case with various consultants.  The critical care time associated with the care of the patient was 35 minutes. Review chart for interventions. This time is exclusive of any other billable procedures.       I verified that the patient was wearing a mask and I was wearing appropriate PPE every time I entered the room. The patient's mask was on the patient " at all times during my encounter except for a brief view of the oropharynx.          FINAL IMPRESSION  1.  Sepsis  2.  Left lower extremity cellulitis  3.  Hypokalemia  4.  Dehydration  5.  Lactic acidosis    Critical care time 35 minutes      Electronically signed by: Geovanna Peraza M.D., 6/11/2022 10:44 PM    This dictation has been created using voice recognition software and/or scribes. The accuracy of the dictation is limited by the abilities of the software and the expertise of the scribes. I expect there may be some errors of grammar and possibly content. I made every attempt to manually correct the errors within my dictation. However, errors related to voice recognition software and/or scribes may still exist and should be interpreted within the appropriate context.

## 2022-06-12 NOTE — PROGRESS NOTES
SBAR report called to Anna ROSARIO at [facility]. All questions answered. Awaiting transport.

## 2022-06-12 NOTE — DISCHARGE PLANNING
note:  [place] from [facility] [place] that pt is being transferred to [facility] RM # [id number]. PCS and Approved services faxed to [phone].    Notified Gabyronel Carey, , of the transfer. She is agreeable.

## 2022-06-12 NOTE — H&P
Hospital Medicine History & Physical Note    Date of Service  6/12/2022    Primary Care Physician  Stoney Meneses, A.P.R.N.    Code Status  Full Code    Chief Complaint  Chief Complaint   Patient presents with   • Fever   • Rash     L lower leg with swelling   • Shortness of Breath     Started today       History of Presenting Illness  Adama Burt III is a 26 y.o. male who presented 6/11/2022 with rash.  PMH of psychiatric disorder, central sleep apnea not compliant with CPAP, history of group A strep bacteremia secondary to left lower extremity cellulitis admitted to this facility 04/2022.  Comes in again today with left lower extremity warmth, erythema, swelling for the past 2 days.  He did complain of feeling very anxious when he noted the symptoms, said he had a similar feeling last time he was in the hospital.  Denies cough, shortness of breath, bowel or urinary symptoms.    In the ED febrile at 103.5, tachycardic, tachypneic, saturating well on room air.  Labs showed WBC 14.6, creatinine 1.21, bicarb 17.    I discussed the plan of care with patient, bedside RN and edp.    Review of Systems  Review of Systems   Constitutional: Negative for chills and fever.   HENT: Negative for sore throat.    Respiratory: Negative for cough and shortness of breath.    Cardiovascular: Negative for chest pain.   Gastrointestinal: Negative for abdominal pain, diarrhea, nausea and vomiting.   Genitourinary: Negative for dysuria and urgency.   Neurological: Negative for dizziness and headaches.   Psychiatric/Behavioral: The patient is nervous/anxious.    All other systems reviewed and are negative.      Past Medical History   has a past medical history of ADHD (attention deficit hyperactivity disorder), Apnea, sleep, Bipolar 1 disorder (HCC), Blood clotting disorder (HCC), Breath shortness, Daytime sleepiness, Gasping for breath, High cholesterol, Hypertension, Obesity, ODD (oppositional defiant disorder), Pneumonia  (04/2021), Sleep apnea, and Snoring.    Surgical History   has a past surgical history that includes appendectomy child; tonsillectomy (3/17/2010); other; and pr lap, pieter restrict proc, longitudinal gas* (N/A, 10/29/2021).     Family History  Family history is unknown by patient.   Family history reviewed with patient. There is no family history that is pertinent to the chief complaint.     Social History   reports that he has never smoked. He has never used smokeless tobacco. He reports current alcohol use. He reports that he does not use drugs.    Allergies  No Known Allergies    Medications  Prior to Admission Medications   Prescriptions Last Dose Informant Patient Reported? Taking?   acetaminophen (TYLENOL) 500 MG Tab  Patient Yes No   Sig: Take 1,000 mg by mouth 2 times a day as needed. Indications: Pain   ammonium lactate (LAC-HYDRIN) 12 % Lotion   No No   Sig: Apply 1 Application topically 2 times a day.   aripiprazole (ABILIFY) 30 MG tablet  Patient Yes No   Sig: Take 30 mg by mouth every day.   atorvastatin (LIPITOR) 20 MG Tab  Patient Yes No   Sig: Take 20 mg by mouth every morning.   carBAMazepine (TEGRETOL) 200 MG Tab  Patient Yes No   Sig: Take 200 mg by mouth 2 times a day.   ibuprofen (MOTRIN) 600 MG Tab   No No   Sig: Take 1 Tablet by mouth every 6 hours as needed.   metFORMIN ER (GLUCOPHAGE XR) 750 MG TABLET SR 24 HR  Patient No No   Sig: TAKE 1 TABLET BY MOUTH ONCE DAILY.   Patient taking differently: Take 750 mg by mouth every morning. TAKE 1 TABLET BY MOUTH ONCE DAILY.   nystatin (MYCOSTATIN) powder  Patient Yes No   Sig: Apply 1 Application topically 4 times a day. Apply to folds   topiramate (TOPAMAX) 50 MG tablet  Patient No No   Sig: Take 1 tablet by mouth every evening.   traZODone (DESYREL) 50 MG Tab  Patient Yes No   Sig: Take 50 mg by mouth at bedtime.      Facility-Administered Medications: None       Physical Exam  Temp:  [38.6 °C (101.5 °F)-39.7 °C (103.5 °F)] 38.6 °C (101.5  °F)  Pulse:  [114-122] 117  Resp:  [20-24] 20  BP: (120)/(56-64) 120/56  SpO2:  [92 %-94 %] 92 %  Blood Pressure: 120/56   Temperature: (!) 38.6 °C (101.5 °F)   Pulse: (!) 117   Respiration: 20   Pulse Oximetry: 92 %       Physical Exam  Constitutional:       Appearance: Normal appearance. He is obese. He is diaphoretic.   HENT:      Head: Normocephalic and atraumatic.      Mouth/Throat:      Mouth: Mucous membranes are moist.      Pharynx: Oropharynx is clear. No oropharyngeal exudate or posterior oropharyngeal erythema.   Eyes:      General: No scleral icterus.  Cardiovascular:      Rate and Rhythm: Normal rate and regular rhythm.      Pulses: Normal pulses.      Heart sounds: Normal heart sounds. No murmur heard.  Pulmonary:      Effort: Pulmonary effort is normal. No respiratory distress.      Breath sounds: Normal breath sounds. No wheezing.   Abdominal:      Palpations: Abdomen is soft.      Tenderness: There is no abdominal tenderness.   Musculoskeletal:         General: No swelling or tenderness. Normal range of motion.      Cervical back: Normal range of motion.      Comments: Left lower extremity shin warm, erythematous, swollen, no discharge   Skin:     General: Skin is warm.   Neurological:      General: No focal deficit present.      Mental Status: He is alert and oriented to person, place, and time. Mental status is at baseline.   Psychiatric:         Mood and Affect: Mood normal.         Laboratory:  Recent Labs     06/11/22  2305   WBC 14.6*   RBC 4.94   HEMOGLOBIN 14.6   HEMATOCRIT 43.8   MCV 88.7   MCH 29.6   MCHC 33.3*   RDW 42.5   PLATELETCT 221   MPV 11.3     Recent Labs     06/12/22  0023   SODIUM 136   POTASSIUM 2.9*   CHLORIDE 104   CO2 17*   GLUCOSE 141*   BUN 20   CREATININE 1.25   CALCIUM 9.0     Recent Labs     06/12/22  0023   ALTSGPT 16   ASTSGOT 22   ALKPHOSPHAT 90   TBILIRUBIN 1.8*   GLUCOSE 141*         No results for input(s): NTPROBNP in the last 72 hours.      No results for  input(s): TROPONINT in the last 72 hours.    Imaging:  DX-CHEST-PORTABLE (1 VIEW)   Final Result      No radiographic evidence of acute cardiopulmonary process.          X-Ray:  I have personally reviewed the images and compared with prior images. and My impression is: Bibasilar atelectasis, similar to prior no acute change    Assessment/Plan:  Justification for Admission Status  I anticipate this patient will require at least two midnights for appropriate medical management, necessitating inpatient admission because Will be admitted for sepsis secondary to cellulitis, 4/4 SIRS criteria in addition he has significant dehydration with electrolyte derangement and KIERA.    * Sepsis with acute renal failure without septic shock (HCC)- (present on admission)  Assessment & Plan  This is Sepsis Present on admission  SIRS criteria identified on my evaluation include: Fever, with temperature greater than 101 deg F, Tachycardia, with heart rate greater than 90 BPM, Tachypnea, with respirations greater than 20 per minute and Leukocytosis, with WBC greater than 12,000  Source is left lower extremity cellulitis  Sepsis protocol initiated  Fluid resuscitation ordered per protocol  Crystalloid Fluid Administration: Fluid resuscitation ordered per standard protocol - 30 mL/kg per current or ideal body weight  IV antibiotics as appropriate for source of sepsis  Reassessment: I have reassessed the patient's hemodynamic status    KIERA (acute kidney injury) (HCC)- (present on admission)  Assessment & Plan  Baseline creatinine about 0.9, 1.1 on presentation with decreased urinary output for the past 2 days  Secondary to sepsis and decreased p.o. intake due to acute illness  IV fluids, with nephrotoxic agents    Cellulitis of left lower extremity- (present on admission)  Assessment & Plan  Left lower extremity cellulitis, symptoms for the past 2 days  Last admission to this facility 04/22 with left lower extremity cellulitis as well.   Wound cultures at that time grew MSSA, bacteremic at the time grew group B strep  Will be on cefazolin pending cultures    Hypokalemia- (present on admission)  Assessment & Plan  2.9 on presentation, replete as needed check magnesium    Nocturnal hypoxemia- (present on admission)  Assessment & Plan  History of sleep apnea, noncompliant with CPAP.  States he does use oxygen at night    Bipolar disorder, current episode mixed, moderate (HCC)- (present on admission)  Assessment & Plan  Continue home meds    Hypercholesterolemia- (present on admission)  Assessment & Plan  Continue statin    Class 3 severe obesity due to excess calories with serious comorbidity and body mass index (BMI) of 50.0 to 59.9 in adult (HCC)- (present on admission)  Assessment & Plan  BMI 55    Central sleep apnea- (present on admission)  Assessment & Plan  He is noncompliant with CPAP.  Nocturnal hypoxemia, but is not sure what his baseline is      VTE prophylaxis: enoxaparin ppx

## 2022-06-12 NOTE — PROGRESS NOTES
[facility] Medicine Daily Progress Note    Date of Service  6/12/2022    Chief Complaint  Admaa Burt III is a 26 y.o. male admitted 6/11/2022 with rash.     Hospital Course  26 y.o. male who presented 6/11/2022 with rash.  PMH of psychiatric disorder, central sleep apnea not compliant with CPAP, history of group A strep bacteremia secondary to left lower extremity cellulitis admitted to this facility 04/2022.  Comes in again today with left lower extremity warmth, erythema, swelling for the past 2 days.  He did complain of feeling very anxious when he noted the symptoms, said he had a similar feeling last time he was in the hospital. In the ED febrile at 103.5, tachycardic, tachypneic, saturating well on room air.  Labs showed WBC 14.6, creatinine 1.21, bicarb 17. Patient admitted for sepsis due to cellulitis with KIERA and placed on IV abx and IV fluids.    Interval Problem Update  Vitals stable this morning, febrile last night. Procalcitonin elevated 5. Cultures pending. Erythema is worsening, now to the back of the left leg change abx to unasyn. MRSA screen pending. Patient denies pain in his legs put does have itching.    I have personally seen and examined the patient at bedside. I discussed the plan of care with patient and bedside RN.    Consultants/Specialty  N/A    Code Status  Full Code    Disposition  Patient is not medically cleared for discharge.   Anticipate discharge to to home with close outpatient follow-up.  I have placed the appropriate orders for post-discharge needs.    Review of Systems  Review of Systems   Constitutional: Positive for fever. Negative for chills.   Respiratory: Negative for shortness of breath.    Cardiovascular: Negative for chest pain.   Gastrointestinal: Negative for abdominal pain, nausea and vomiting.   Genitourinary: Negative for dysuria.   Musculoskeletal: Negative for myalgias.   Skin: Positive for itching and rash.   Neurological: Negative for dizziness and  headaches.   Psychiatric/Behavioral: Negative for depression. The patient is nervous/anxious.    All other systems reviewed and are negative.       Physical Exam  Temp:  [37.1 °C (98.8 °F)-39.7 °C (103.5 °F)] 37.1 °C (98.8 °F)  Pulse:  [] 67  Resp:  [17-24] 18  BP: (101-144)/(56-91) 144/79  SpO2:  [88 %-99 %] 99 %    Physical Exam  Vitals and nursing note reviewed.   Constitutional:       General: He is not in acute distress.     Appearance: He is obese.   HENT:      Head: Normocephalic and atraumatic.   Eyes:      Conjunctiva/sclera: Conjunctivae normal.      Pupils: Pupils are equal, round, and reactive to light.   Cardiovascular:      Rate and Rhythm: Normal rate and regular rhythm.      Pulses: Normal pulses.   Pulmonary:      Effort: Pulmonary effort is normal. No respiratory distress.      Breath sounds: Normal breath sounds. No wheezing.   Abdominal:      General: Abdomen is flat. There is no distension.      Palpations: Abdomen is soft.      Tenderness: There is no abdominal tenderness.   Musculoskeletal:         General: No swelling. Normal range of motion.      Cervical back: Normal range of motion and neck supple.      Comments: Left lower extremity shin warm, erythematous, swollen  Erythema has moved passed lines on admission, moving towards posterior leg   Skin:     General: Skin is warm and dry.      Findings: No rash.   Neurological:      General: No focal deficit present.      Mental Status: He is alert and oriented to person, place, and time.      Cranial Nerves: No cranial nerve deficit.   Psychiatric:         Mood and Affect: Mood normal.         Behavior: Behavior normal.         Fluids    Intake/Output Summary (Last 24 hours) at 6/12/2022 1033  Last data filed at 6/12/2022 0245  Gross per 24 hour   Intake 2000 ml   Output --   Net 2000 ml       Laboratory  Recent Labs     06/11/22  2305   WBC 14.6*   RBC 4.94   HEMOGLOBIN 14.6   HEMATOCRIT 43.8   MCV 88.7   MCH 29.6   MCHC 33.3*   RDW 42.5    PLATELETCT 221   MPV 11.3     Recent Labs     06/12/22  0023 06/12/22  0850   SODIUM 136 139   POTASSIUM 2.9* 3.6   CHLORIDE 104 105   CO2 17* 24   GLUCOSE 141* 122*   BUN 20 20   CREATININE 1.25 1.12   CALCIUM 9.0 8.9                   Imaging  DX-CHEST-PORTABLE (1 VIEW)   Final Result      No radiographic evidence of acute cardiopulmonary process.           Assessment/Plan  * Sepsis with acute renal failure without septic shock (HCC)- (present on admission)  Assessment & Plan  This is Sepsis Present on admission  SIRS criteria identified on my evaluation include: Fever, with temperature greater than 101 deg F, Tachycardia, with heart rate greater than 90 BPM, Tachypnea, with respirations greater than 20 per minute and Leukocytosis, with WBC greater than 12,000  Source is left lower extremity cellulitis  Sepsis protocol initiated  Fluid resuscitation ordered per protocol  Crystalloid Fluid Administration: Fluid resuscitation ordered per standard protocol - 30 mL/kg per current or ideal body weight  IV antibiotics as appropriate for source of sepsis  Reassessment: I have reassessed the patient's hemodynamic status    Cellulitis of left lower extremity- (present on admission)  Assessment & Plan  Left lower extremity cellulitis, symptoms for the past 2 days  Last admission to this facility 04/22 with left lower extremity cellulitis as well.  Wound cultures at that time grew MSSA, bacteremic at the time grew group B strep  Change abx unasyn and vanc in light of worsening erythema  MRSA screen pending    Nocturnal hypoxemia- (present on admission)  Assessment & Plan  History of sleep apnea, noncompliant with CPAP.  States he does use oxygen at night    Hypokalemia- (present on admission)  Assessment & Plan  2.9 on presentation, replete as needed check magnesium    KIERA (acute kidney injury) (HCC)- (present on admission)  Assessment & Plan  Baseline creatinine about 0.9, 1.1 on presentation with decreased urinary output  for the past 2 days  Secondary to sepsis and decreased p.o. intake due to acute illness  IV fluids, with nephrotoxic agents    Bipolar disorder, current episode mixed, moderate (HCC)- (present on admission)  Assessment & Plan  Continue home meds    Hypercholesterolemia- (present on admission)  Assessment & Plan  Continue statin    Class 3 severe obesity due to excess calories with serious comorbidity and body mass index (BMI) of 50.0 to 59.9 in adult ([facility])- (present on admission)  Assessment & Plan  BMI 55    Central sleep apnea- (present on admission)  Assessment & Plan  He is noncompliant with CPAP.  Nocturnal hypoxemia, but is not sure what his baseline is       VTE prophylaxis: enoxaparin ppx

## 2022-06-12 NOTE — PROGRESS NOTES
4 Eyes Skin Assessment Completed by MARLENE Harrison and MARLENE Castillo.    Head WDL  Ears WDL  Nose WDL  Mouth WDL  Neck WDL  Breast/Chest WDL  Shoulder Blades WDL  Spine WDL  (R) Arm/Elbow/Hand WDL  (L) Arm/Elbow/Hand Scab PTA - NIECY  Abdomen WDL pannis, no skin breakdown noted  Groin WDL  Scrotum/Coccyx/Buttocks WDL  (R) Leg WDL  (L) Leg Redness / dx cellulitis  (R) Heel/Foot/Toe WDL  (L) Heel/Foot/Toe callus to heel          Devices In Places Blood Pressure Cuff      Interventions In Place Pressure Redistribution Mattress    Possible Skin Injury No    Pictures Uploaded Into Epic N/A  Wound Consult Placed N/A  RN Wound Prevention Protocol Ordered No

## 2022-06-12 NOTE — PROGRESS NOTES
Med rec completed by [facility] Med Rec Tech this morning.      Patient denies taking any prescription or OTC medications in over 6 months.      Rekha Lucas, MelvinaD, BCPS

## 2022-06-12 NOTE — PROGRESS NOTES
"Pt arrived to unit w/out event. Pt is calm, cooperative, pleasant affect, chatty. CNA has completed initial VS, pt into gown. Pt is independent to ambulate SBA for safety. Independent w/ turns for skin integrity. Pt gave stated understanding that he is to use call light for all ADL needs. RN EDU done on \"I.S.\" CDB will reinforce t/o day. RN reviewed POC which is for abx tx to LLE, monitor for s/sx sepsis.Area of redness to LLE has been marked.  EDU done on \"ankle pumps\" to prevent clotts and promote circulation, Lovenox per MAR for VTE. . Hourly rounds ongoing, call light w/in reach.   "

## 2022-06-12 NOTE — PROGRESS NOTES
Patient arrived to yellow 60, denies needs at this time. Placed on monitors. Afebrile at this time. Rash to LLE outlined.

## 2022-06-12 NOTE — ED NOTES
Obtained oral temp on pt. NADN and voices no needs at this time. VSS. ERP made aware of pt's temp.

## 2022-06-12 NOTE — CARE PLAN
The patient is Stable - Low risk of patient condition declining or worsening    Shift Goals administer abx per orders in a timely manner  Clinical Goals: marked bounderies of redness will not expand through shift  Patient Goals: sleep this afternoon x 1 hour uninterrupted by staff care.  Family Goals: no family present    Progress made toward(s) clinical / shift goals:      Patient is not progressing towards the following goals:

## 2022-06-12 NOTE — H&P
This version of the note has been redacted during the course of a chart correction case. If you need access to the original text of this version of the note, please contact the Health Information Management department at [phone].

## 2022-06-12 NOTE — ASSESSMENT & PLAN NOTE
Baseline creatinine about 0.9, 1.1 on presentation with decreased urinary output for the past 2 days  Secondary to sepsis and decreased p.o. intake due to acute illness  IV fluids, with nephrotoxic agents

## 2022-06-12 NOTE — ED NOTES
Med Rec completed per pt  Allergies reviewed      Pt denies taking any RX's or OTC's in over 6 months

## 2022-06-12 NOTE — ED NOTES
Pt wheeled to rm. Able to ambulate from chair to bed w/o assistance. Connected to VS, resting comfortably NAD, equal chest rise/fall.

## 2022-06-13 LAB
ANION GAP SERPL CALC-SCNC: 13 MMOL/L (ref 7–16)
BASOPHILS # BLD AUTO: 0.6 % (ref 0–1.8)
BASOPHILS # BLD: 0.07 K/UL (ref 0–0.12)
BUN SERPL-MCNC: 14 MG/DL (ref 8–22)
CALCIUM SERPL-MCNC: 8.7 MG/DL (ref 8.4–10.2)
CHLORIDE SERPL-SCNC: 107 MMOL/L (ref 96–112)
CO2 SERPL-SCNC: 20 MMOL/L (ref 20–33)
CREAT SERPL-MCNC: 0.82 MG/DL (ref 0.5–1.4)
EOSINOPHIL # BLD AUTO: 0.02 K/UL (ref 0–0.51)
EOSINOPHIL NFR BLD: 0.2 % (ref 0–6.9)
ERYTHROCYTE [DISTWIDTH] IN BLOOD BY AUTOMATED COUNT: 43.5 FL (ref 35.9–50)
GFR SERPLBLD CREATININE-BSD FMLA CKD-EPI: 122 ML/MIN/1.73 M 2
GLUCOSE SERPL-MCNC: 99 MG/DL (ref 65–99)
HCT VFR BLD AUTO: 37.2 % (ref 42–52)
HGB BLD-MCNC: 12.3 G/DL (ref 14–18)
IMM GRANULOCYTES # BLD AUTO: 0.03 K/UL (ref 0–0.11)
IMM GRANULOCYTES NFR BLD AUTO: 0.2 % (ref 0–0.9)
LYMPHOCYTES # BLD AUTO: 1.68 K/UL (ref 1–4.8)
LYMPHOCYTES NFR BLD: 13.5 % (ref 22–41)
MAGNESIUM SERPL-MCNC: 1.7 MG/DL (ref 1.5–2.5)
MCH RBC QN AUTO: 30 PG (ref 27–33)
MCHC RBC AUTO-ENTMCNC: 33.1 G/DL (ref 33.7–35.3)
MCV RBC AUTO: 90.7 FL (ref 81.4–97.8)
MONOCYTES # BLD AUTO: 0.99 K/UL (ref 0–0.85)
MONOCYTES NFR BLD AUTO: 8 % (ref 0–13.4)
NEUTROPHILS # BLD AUTO: 9.62 K/UL (ref 1.82–7.42)
NEUTROPHILS NFR BLD: 77.5 % (ref 44–72)
NRBC # BLD AUTO: 0 K/UL
NRBC BLD-RTO: 0 /100 WBC
PLATELET # BLD AUTO: 174 K/UL (ref 164–446)
PMV BLD AUTO: 11.9 FL (ref 9–12.9)
POTASSIUM SERPL-SCNC: 3.2 MMOL/L (ref 3.6–5.5)
RBC # BLD AUTO: 4.1 M/UL (ref 4.7–6.1)
SODIUM SERPL-SCNC: 140 MMOL/L (ref 135–145)
WBC # BLD AUTO: 12.4 K/UL (ref 4.8–10.8)

## 2022-06-13 PROCEDURE — 770006 HCHG ROOM/CARE - MED/SURG/GYN SEMI*

## 2022-06-13 PROCEDURE — A9270 NON-COVERED ITEM OR SERVICE: HCPCS | Performed by: HOSPITALIST

## 2022-06-13 PROCEDURE — 36415 COLL VENOUS BLD VENIPUNCTURE: CPT

## 2022-06-13 PROCEDURE — 83735 ASSAY OF MAGNESIUM: CPT

## 2022-06-13 PROCEDURE — 85025 COMPLETE CBC W/AUTO DIFF WBC: CPT

## 2022-06-13 PROCEDURE — 99233 SBSQ HOSP IP/OBS HIGH 50: CPT | Performed by: HOSPITALIST

## 2022-06-13 PROCEDURE — 80048 BASIC METABOLIC PNL TOTAL CA: CPT

## 2022-06-13 PROCEDURE — A9270 NON-COVERED ITEM OR SERVICE: HCPCS | Performed by: INTERNAL MEDICINE

## 2022-06-13 PROCEDURE — 700111 HCHG RX REV CODE 636 W/ 250 OVERRIDE (IP): Performed by: INTERNAL MEDICINE

## 2022-06-13 PROCEDURE — 700102 HCHG RX REV CODE 250 W/ 637 OVERRIDE(OP): Performed by: INTERNAL MEDICINE

## 2022-06-13 PROCEDURE — 700105 HCHG RX REV CODE 258: Performed by: INTERNAL MEDICINE

## 2022-06-13 PROCEDURE — 700102 HCHG RX REV CODE 250 W/ 637 OVERRIDE(OP): Performed by: HOSPITALIST

## 2022-06-13 RX ORDER — POTASSIUM CHLORIDE 20 MEQ/1
40 TABLET, EXTENDED RELEASE ORAL ONCE
Status: COMPLETED | OUTPATIENT
Start: 2022-06-13 | End: 2022-06-13

## 2022-06-13 RX ADMIN — ENOXAPARIN SODIUM 40 MG: 40 INJECTION SUBCUTANEOUS at 17:46

## 2022-06-13 RX ADMIN — SODIUM CHLORIDE 3 G: 900 INJECTION INTRAVENOUS at 05:48

## 2022-06-13 RX ADMIN — Medication 5 MG: at 01:11

## 2022-06-13 RX ADMIN — DIPHENHYDRAMINE HCL 25 MG: 25 TABLET ORAL at 17:46

## 2022-06-13 RX ADMIN — SENNOSIDES AND DOCUSATE SODIUM 2 TABLET: 50; 8.6 TABLET ORAL at 17:47

## 2022-06-13 RX ADMIN — SODIUM CHLORIDE 3 G: 900 INJECTION INTRAVENOUS at 17:46

## 2022-06-13 RX ADMIN — SODIUM CHLORIDE 3 G: 900 INJECTION INTRAVENOUS at 11:32

## 2022-06-13 RX ADMIN — POTASSIUM CHLORIDE 40 MEQ: 1500 TABLET, EXTENDED RELEASE ORAL at 09:38

## 2022-06-13 RX ADMIN — SODIUM CHLORIDE, POTASSIUM CHLORIDE, SODIUM LACTATE AND CALCIUM CHLORIDE: 600; 310; 30; 20 INJECTION, SOLUTION INTRAVENOUS at 02:10

## 2022-06-13 RX ADMIN — Medication 5 MG: at 20:38

## 2022-06-13 RX ADMIN — ACETAMINOPHEN 650 MG: 325 TABLET, FILM COATED ORAL at 22:43

## 2022-06-13 RX ADMIN — SODIUM CHLORIDE 3 G: 900 INJECTION INTRAVENOUS at 23:45

## 2022-06-13 ASSESSMENT — ENCOUNTER SYMPTOMS
VOMITING: 0
PALPITATIONS: 0
SHORTNESS OF BREATH: 0
DIZZINESS: 0
CHILLS: 0
NAUSEA: 0
FEVER: 1
COUGH: 0
HEADACHES: 0
ABDOMINAL PAIN: 0

## 2022-06-13 ASSESSMENT — PAIN DESCRIPTION - PAIN TYPE
TYPE: CHRONIC PAIN
TYPE: ACUTE PAIN

## 2022-06-13 NOTE — PROGRESS NOTES
Assumed care of patient at 1900.  Patient is alert and oriented, resting in bed at shift change and appears to be in no distress.  Patient remains in bed at this time.  Encouraged patient to call for assistance with ambulation and IV pole.  Redness outlined with marker to left leg and does not appear to be improving at this time.  Patient denies pain this shift.  Hourly rounding continues.

## 2022-06-13 NOTE — CARE PLAN
"  Problem: Knowledge Deficit - Standard  Goal: Patient and family/care givers will demonstrate understanding of plan of care, disease process/condition, diagnostic tests and medications  Outcome: Progressing     Problem: Hemodynamics  Goal: Patient's hemodynamics, fluid balance and neurologic status will be stable or improve  Outcome: Progressing     Problem: Fluid Volume  Goal: Fluid volume balance will be maintained  Outcome: Progressing     Problem: Urinary - Renal Perfusion  Goal: Ability to achieve and maintain adequate renal perfusion and functioning will improve  Outcome: Progressing     Problem: Respiratory  Goal: Patient will achieve/maintain optimum respiratory ventilation and gas exchange  Outcome: Progressing     Problem: Physical Regulation  Goal: Diagnostic test results will improve  Outcome: Progressing     Problem: Pain - Standard  Goal: Alleviation of pain or a reduction in pain to the patient’s comfort goal  Outcome: Progressing   The patient is Stable - Low risk of patient condition declining or worsening    Shift Goals  Clinical Goals: Redness to leg will decrease  Patient Goals: Patient will be able to sleep tonight  Family Goals: no family present    Progress made toward(s) clinical / shift goals:  Redness in leg not expanding past marker.  Patient is sleeping off and on tonight.  Up to the bathroom to void frequently.  Patient states he cannot use a urinal r/t \"my big belly\".          "

## 2022-06-13 NOTE — ASSESSMENT & PLAN NOTE
This is Sepsis Present on admission  SIRS criteria identified on my evaluation include: Fever, with temperature greater than 101 deg F, Tachycardia, with heart rate greater than 90 BPM and Leukocytosis, with WBC greater than 12,000  Source is LLE  Sepsis protocol initiated  Fluid resuscitation ordered per protocol  Crystalloid Fluid Administration: Fluid resuscitation ordered per standard protocol - 30 mL/kg per current or ideal body weight  IV antibiotics as appropriate for source of sepsis  Reassessment: I have reassessed the patient's hemodynamic status  improving

## 2022-06-13 NOTE — CARE PLAN
The patient is Stable - Low risk of patient condition declining or worsening    Shift Goals  Clinical Goals: marked bounderies of redness will not expand through shift  Patient Goals: sleep this afternoon x 1 hour uninterrupted by staff care.  Family Goals: no family present    Progress made toward(s) clinical / shift goals:  redness has stayed within borders as marked on LLE    Patient is not progressing towards the following goals: TBD / has just started abx tx

## 2022-06-13 NOTE — DIETARY
NUTRITION SERVICES: BMI - Pt with BMI >40 (=Body mass index is 54.99 kg/m².), Class III obesity. Weight loss counseling not appropriate in acute care setting. RECOMMEND - If appropriate at DC please refer to outpatient nutrition services for weight management.

## 2022-06-13 NOTE — HOSPITAL COURSE
26 y.o. male who presented 6/11/2022 with rash.  PMH of psychiatric disorder, central sleep apnea not compliant with CPAP, history of group A strep bacteremia secondary to left lower extremity cellulitis admitted to this facility 04/2022.  Comes in again today with left lower extremity warmth, erythema, swelling. Admitted with sepsis secondary to cellulitis.

## 2022-06-13 NOTE — PROGRESS NOTES
[facility] Medicine Daily Progress Note    Date of Service  6/13/2022    Chief Complaint  Adama Burt III is a 26 y.o. male admitted 6/12/2022 with cellulitis    Hospital Course  26 y.o. male who presented 6/11/2022 with rash.  PMH of psychiatric disorder, central sleep apnea not compliant with CPAP, history of group A strep bacteremia secondary to left lower extremity cellulitis admitted to this facility 04/2022.  Comes in again today with left lower extremity warmth, erythema, swelling. Admitted with sepsis secondary to cellulitis.       Interval Problem Update  tmax 100.3  His cellulitis is improved  Pain has decreased    I have personally seen and examined the patient at bedside. I discussed the plan of care with patient, bedside RN, charge RN,  and pharmacy.    Consultants/Specialty  none    Code Status  Full Code    Disposition  Patient is not medically cleared for discharge.   Anticipate discharge to to home with close outpatient follow-up.  I have placed the appropriate orders for post-discharge needs.    Review of Systems  Review of Systems   Constitutional: Positive for fever. Negative for chills.   Respiratory: Negative for cough and shortness of breath.    Cardiovascular: Negative for chest pain and palpitations.   Gastrointestinal: Negative for abdominal pain, nausea and vomiting.   Genitourinary: Negative for dysuria and urgency.   Skin: Positive for rash.   Neurological: Negative for dizziness and headaches.   All other systems reviewed and are negative.       Physical Exam  Temp:  [36.7 °C (98 °F)-37.9 °C (100.3 °F)] 36.9 °C (98.5 °F)  Pulse:  [71-88] 71  Resp:  [18] 18  BP: ()/(48-78) 123/67  SpO2:  [92 %-95 %] 93 %    Physical Exam  Vitals and nursing note reviewed.   Constitutional:       Appearance: Normal appearance.   Cardiovascular:      Rate and Rhythm: Normal rate and regular rhythm.      Heart sounds: No murmur heard.  Pulmonary:      Effort: Pulmonary effort  is normal. No respiratory distress.      Breath sounds: Normal breath sounds.   Skin:     General: Skin is warm.      Findings: Erythema present.      Comments: Involving LLE, improving   Neurological:      General: No focal deficit present.      Mental Status: He is alert and oriented to person, place, and time.         Fluids    Intake/Output Summary (Last 24 hours) at 6/13/2022 1219  Last data filed at 6/13/2022 0800  Gross per 24 hour   Intake 2640 ml   Output 1 ml   Net 2639 ml       Laboratory  Recent Labs     06/11/22  2305 06/13/22  0206   WBC 14.6* 12.4*   RBC 4.94 4.10*   HEMOGLOBIN 14.6 12.3*   HEMATOCRIT 43.8 37.2*   MCV 88.7 90.7   MCH 29.6 30.0   MCHC 33.3* 33.1*   RDW 42.5 43.5   PLATELETCT 221 174   MPV 11.3 11.9     Recent Labs     06/12/22  0023 06/12/22  0850 06/13/22  0206   SODIUM 136 139 140   POTASSIUM 2.9* 3.6 3.2*   CHLORIDE 104 105 107   CO2 17* 24 20   GLUCOSE 141* 122* 99   BUN 20 20 14   CREATININE 1.25 1.12 0.82   CALCIUM 9.0 8.9 8.7                   Imaging  No orders to display        Assessment/Plan  * Sepsis (HCC)- (present on admission)  Assessment & Plan  This is Sepsis Present on admission  SIRS criteria identified on my evaluation include: Fever, with temperature greater than 101 deg F, Tachycardia, with heart rate greater than 90 BPM and Leukocytosis, with WBC greater than 12,000  Source is LLE  Sepsis protocol initiated  Fluid resuscitation ordered per protocol  Crystalloid Fluid Administration: Fluid resuscitation ordered per standard protocol - 30 mL/kg per current or ideal body weight  IV antibiotics as appropriate for source of sepsis  Reassessment: I have reassessed the patient's hemodynamic status  improving          KIERA (acute kidney injury) (HCC)- (present on admission)  Assessment & Plan  resolved    Cellulitis of left lower extremity- (present on admission)  Assessment & Plan  Failed outpt antibiotics  Now improving  Cont unasyn  antiicpate dc home tomorrow        VTE prophylaxis: enoxaparin ppx    I have performed a physical exam and reviewed and updated ROS and Plan today (6/13/2022). In review of yesterday's note (6/12/2022), there are no changes except as documented above.

## 2022-06-14 ENCOUNTER — PATIENT OUTREACH (OUTPATIENT)
Dept: HEALTH INFORMATION MANAGEMENT | Facility: OTHER | Age: 27
End: 2022-06-14
Payer: MEDICAID

## 2022-06-14 VITALS
TEMPERATURE: 97.8 F | BODY MASS INDEX: 46.65 KG/M2 | HEIGHT: 69 IN | SYSTOLIC BLOOD PRESSURE: 137 MMHG | HEART RATE: 65 BPM | OXYGEN SATURATION: 97 % | DIASTOLIC BLOOD PRESSURE: 77 MMHG | RESPIRATION RATE: 18 BRPM | WEIGHT: 315 LBS

## 2022-06-14 LAB
ANION GAP SERPL CALC-SCNC: 10 MMOL/L (ref 7–16)
BACTERIA UR CULT: NORMAL
BUN SERPL-MCNC: 10 MG/DL (ref 8–22)
CALCIUM SERPL-MCNC: 9.2 MG/DL (ref 8.4–10.2)
CHLORIDE SERPL-SCNC: 103 MMOL/L (ref 96–112)
CO2 SERPL-SCNC: 26 MMOL/L (ref 20–33)
CREAT SERPL-MCNC: 0.72 MG/DL (ref 0.5–1.4)
ERYTHROCYTE [DISTWIDTH] IN BLOOD BY AUTOMATED COUNT: 44.2 FL (ref 35.9–50)
GFR SERPLBLD CREATININE-BSD FMLA CKD-EPI: 127 ML/MIN/1.73 M 2
GLUCOSE SERPL-MCNC: 89 MG/DL (ref 65–99)
HCT VFR BLD AUTO: 37.9 % (ref 42–52)
HGB BLD-MCNC: 12.3 G/DL (ref 14–18)
MAGNESIUM SERPL-MCNC: 1.9 MG/DL (ref 1.5–2.5)
MCH RBC QN AUTO: 29.6 PG (ref 27–33)
MCHC RBC AUTO-ENTMCNC: 32.5 G/DL (ref 33.7–35.3)
MCV RBC AUTO: 91.3 FL (ref 81.4–97.8)
PLATELET # BLD AUTO: 157 K/UL (ref 164–446)
PMV BLD AUTO: 11.7 FL (ref 9–12.9)
POTASSIUM SERPL-SCNC: 3.5 MMOL/L (ref 3.6–5.5)
RBC # BLD AUTO: 4.15 M/UL (ref 4.7–6.1)
SIGNIFICANT IND 70042: NORMAL
SITE SITE: NORMAL
SODIUM SERPL-SCNC: 139 MMOL/L (ref 135–145)
SOURCE SOURCE: NORMAL
WBC # BLD AUTO: 6.2 K/UL (ref 4.8–10.8)

## 2022-06-14 PROCEDURE — 83735 ASSAY OF MAGNESIUM: CPT

## 2022-06-14 PROCEDURE — 94760 N-INVAS EAR/PLS OXIMETRY 1: CPT

## 2022-06-14 PROCEDURE — 99239 HOSP IP/OBS DSCHRG MGMT >30: CPT | Performed by: INTERNAL MEDICINE

## 2022-06-14 PROCEDURE — 700102 HCHG RX REV CODE 250 W/ 637 OVERRIDE(OP): Performed by: INTERNAL MEDICINE

## 2022-06-14 PROCEDURE — 85027 COMPLETE CBC AUTOMATED: CPT

## 2022-06-14 PROCEDURE — A9270 NON-COVERED ITEM OR SERVICE: HCPCS | Performed by: INTERNAL MEDICINE

## 2022-06-14 PROCEDURE — 36415 COLL VENOUS BLD VENIPUNCTURE: CPT

## 2022-06-14 PROCEDURE — 80048 BASIC METABOLIC PNL TOTAL CA: CPT

## 2022-06-14 PROCEDURE — 700105 HCHG RX REV CODE 258: Performed by: INTERNAL MEDICINE

## 2022-06-14 PROCEDURE — 700111 HCHG RX REV CODE 636 W/ 250 OVERRIDE (IP): Performed by: INTERNAL MEDICINE

## 2022-06-14 RX ORDER — AMOXICILLIN AND CLAVULANATE POTASSIUM 875; 125 MG/1; MG/1
1 TABLET, FILM COATED ORAL EVERY 12 HOURS
Qty: 7 TABLET | Refills: 0 | Status: SHIPPED | OUTPATIENT
Start: 2022-06-14 | End: 2022-06-18

## 2022-06-14 RX ORDER — AMOXICILLIN AND CLAVULANATE POTASSIUM 875; 125 MG/1; MG/1
1 TABLET, FILM COATED ORAL EVERY 12 HOURS
Status: DISCONTINUED | OUTPATIENT
Start: 2022-06-14 | End: 2022-06-14 | Stop reason: HOSPADM

## 2022-06-14 RX ORDER — POTASSIUM CHLORIDE 20 MEQ/1
20 TABLET, EXTENDED RELEASE ORAL ONCE
Status: COMPLETED | OUTPATIENT
Start: 2022-06-14 | End: 2022-06-14

## 2022-06-14 RX ADMIN — SODIUM CHLORIDE 3 G: 900 INJECTION INTRAVENOUS at 05:36

## 2022-06-14 RX ADMIN — POTASSIUM CHLORIDE 20 MEQ: 1500 TABLET, EXTENDED RELEASE ORAL at 10:49

## 2022-06-14 RX ADMIN — AMOXICILLIN AND CLAVULANATE POTASSIUM 1 TABLET: 875; 125 TABLET, FILM COATED ORAL at 10:49

## 2022-06-14 SDOH — ECONOMIC STABILITY: FOOD INSECURITY: WITHIN THE PAST 12 MONTHS, YOU WORRIED THAT YOUR FOOD WOULD RUN OUT BEFORE YOU GOT MONEY TO BUY MORE.: NEVER TRUE

## 2022-06-14 SDOH — ECONOMIC STABILITY: TRANSPORTATION INSECURITY
IN THE PAST 12 MONTHS, HAS LACK OF TRANSPORTATION KEPT YOU FROM MEETINGS, WORK, OR FROM GETTING THINGS NEEDED FOR DAILY LIVING?: NO

## 2022-06-14 SDOH — ECONOMIC STABILITY: INCOME INSECURITY: HOW HARD IS IT FOR YOU TO PAY FOR THE VERY BASICS LIKE FOOD, HOUSING, MEDICAL CARE, AND HEATING?: NOT HARD AT ALL

## 2022-06-14 SDOH — ECONOMIC STABILITY: TRANSPORTATION INSECURITY
IN THE PAST 12 MONTHS, HAS THE LACK OF TRANSPORTATION KEPT YOU FROM MEDICAL APPOINTMENTS OR FROM GETTING MEDICATIONS?: NO

## 2022-06-14 SDOH — ECONOMIC STABILITY: FOOD INSECURITY: WITHIN THE PAST 12 MONTHS, THE FOOD YOU BOUGHT JUST DIDN'T LAST AND YOU DIDN'T HAVE MONEY TO GET MORE.: NEVER TRUE

## 2022-06-14 ASSESSMENT — PAIN DESCRIPTION - PAIN TYPE: TYPE: ACUTE PAIN

## 2022-06-14 NOTE — PROGRESS NOTES
6/14/22- late entry. JUDY Geller met pt bedside to introduce CCM services. Completed SDOH screening and inpatient assessment. Pt states established with PCP and pharmacy at [facility]. Pt unsure if follow up appointment scheduled would like community health worker to contact pt foster mom for care plan needs. Pt mentions good support from family and friends. No medical equipment used at home. Pt is confident in ability to manage care post d/c. No issues keeping appointments or financial barriers to care. Pt denies need for resources such as food, transportation or housing. CCM contact info left with pt bedside. Pt agrees to follow up call post d/c.    Community Health Worker Intake  • Social determinates of health intake completed.   • Identified barriers to none.  • Contact information provided to Adama Burt III. Yes   • Has PCP appointment scheduled for. CHW to confirm follow up visit scheduled. Will reach out to pt foster mom.   • Scheduled Food Delivery/Home Visit/Outpatient Visit: No  • Accepted/Declined Meds-To-Beds. No  • Inpatient/Outpatient assessment completed. Inpatient   • Did the patient receive medications post discharge: Pt to  medications at [facility].     Plan: Additional outreach call to pt foster moms as pt requested for appointments needed/ AVS/questions.

## 2022-06-14 NOTE — CARE PLAN
The patient is Stable - Low risk of patient condition declining or worsening    Shift Goals  Clinical Goals: Swelling decreased, free of falls, pain managed under score of 5  Patient Goals: to get better and rest well  Family Goals: n/a    Progress made toward(s) clinical / shift goals:  Pt without pain. Pt up and ambulatory to bathroom independently. No c/o pain from pt during the shift. Will continue to monitor.     Patient is not progressing towards the following goals:

## 2022-06-14 NOTE — PROGRESS NOTES
Received bedside report from MARLENE Rivera, pt care assumed. VSS, pt assessment complete. Pt A&Ox4, no c/o pain at this time. POC discussed with pt and verbalizes no questions. Pt denies any additional needs at this time. Bed locked and in lowest position. Pt educated on fall risk and verbalized understanding, call light within reach, hourly rounding initiated.

## 2022-06-14 NOTE — CARE PLAN
The patient is Stable - Low risk of patient condition declining or worsening    Shift Goals  Clinical Goals: pt will report pain less than 4/10 after pain intervention for duration of shift  Patient Goals: to get better and rest well  Family Goals: n/a    Progress made toward(s) clinical / shift goals: Pt pain level stayed below 4/10, only requiring one prn med admin overnight     Patient is not progressing towards the following goals:

## 2022-06-14 NOTE — PROGRESS NOTES
Discharge instructions given and discussed, signed copy in chart. Pt verbalized understanding and all questions answered. Prescriptions sent to pharmacy electronically. Pt discharged home in stable condition on room air escorted by family. Personal belongings verified patient. IV removed and tolerated well.

## 2022-06-14 NOTE — DISCHARGE INSTRUCTIONS
Discharge Instructions    Discharged to home by car with relative. Discharged via wheelchair, hospital escort: Refused.  Special equipment needed: Not Applicable    Be sure to schedule a follow-up appointment with your primary care doctor or any specialists as instructed.     Discharge Plan:   Diet Plan: Discussed  Activity Level: Discussed  Confirmed Follow up Appointment: Patient to Call and Schedule Appointment  Confirmed Symptoms Management: Discussed  Medication Reconciliation Updated: Yes    I understand that a diet low in cholesterol, fat, and sodium is recommended for good health. Unless I have been given specific instructions below for another diet, I accept this instruction as my diet prescription.   Other diet: Healthy diet as before hospital admission    Special Instructions: None    Is patient discharged on Warfarin / Coumadin?   No     Depression / Suicide Risk    As you are discharged from this [facility] facility, it is important to learn how to keep safe from harming yourself.    Recognize the warning signs:  Abrupt changes in personality, positive or negative- including increase in energy   Giving away possessions  Change in eating patterns- significant weight changes-  positive or negative  Change in sleeping patterns- unable to sleep or sleeping all the time   Unwillingness or inability to communicate  Depression  Unusual sadness, discouragement and loneliness  Talk of wanting to die  Neglect of personal appearance   Rebelliousness- reckless behavior  Withdrawal from people/activities they love  Confusion- inability to concentrate     If you or a loved one observes any of these behaviors or has concerns about self-harm, here's what you can do:  Talk about it- your feelings and reasons for harming yourself  Remove any means that you might use to hurt yourself (examples: pills, rope, extension cords, firearm)  Get professional help from the community (Mental Health, Substance Abuse,  psychological counseling)  Do not be alone:Call your Safe Contact- someone whom you trust who will be there for you.  Call your local CRISIS HOTLINE 867-9553 or 697-769-3275  Call your local Children's Mobile Crisis Response Team [place] (561) 545-8651 or www.Arrowsight  Call the toll free National Suicide Prevention Hotlines   National Suicide Prevention Lifeline 718-152-NECV (6189)  [place] [facility] Network 800-SUICIDE (659-9364)

## 2022-06-14 NOTE — DISCHARGE SUMMARY
"Discharge Summary    CHIEF COMPLAINT ON ADMISSION  No chief complaint on file.      Reason for Admission  Sepsis     Admission Date  6/12/2022    CODE STATUS  Full Code    HPI & HOSPITAL COURSE  As per chart review:  \"  26 y.o. male who presented 6/11/2022 with rash.  PMH of psychiatric disorder, central sleep apnea not compliant with CPAP, history of group A strep bacteremia secondary to left lower extremity cellulitis admitted to this facility 04/2022.  Comes in again today with left lower extremity warmth, erythema, swelling. Admitted with sepsis secondary to cellulitis.   \"  The patient was admitted for further management and care.  The patient was initially started on cefazolin, he was then transitioned to Unasyn.  The patient improved significantly while hospitalized.  The patient did not have any more episodes of fever.  Blood cultures remain negative.  We have transitioned to p.o. Augmentin to complete antibiotic treatment.    The patient feels back to his baseline and would like to go home.  We will discharge the patient to complete antibiotic treatment with Augmentin.  The patient will require close follow-up with PCP.  We had a long conversation regarding being compliant with CPAP and he understands.      Therefore, he is discharged in fair and stable condition to home with close outpatient follow-up.    The patient met 2-midnight criteria for an inpatient stay at the time of discharge.    Discharge Date  06/14/2022    FOLLOW UP ITEMS POST DISCHARGE  Patient will require to complete antibiotic treatment as an outpatient.  He will require close follow-up with PCP as an outpatient.    DISCHARGE DIAGNOSES  Principal Problem:    Sepsis (HCC) POA: Yes  Active Problems:    Cellulitis of left lower extremity POA: Yes    KIERA (acute kidney injury) (HCC) POA: Yes  Resolved Problems:    * No resolved hospital problems. *      FOLLOW UP  No future appointments.  Stoney Meneses, A.P.R.N.  [address] " [id number]  [phone]    Schedule an appointment as soon as possible for a visit in 1 week(s)  For Hospital Follow Up      MEDICATIONS ON DISCHARGE     Medication List      START taking these medications      Instructions   amoxicillin-clavulanate 875-125 MG Tabs  Commonly known as: AUGMENTIN   Take 1 Tablet by mouth every 12 hours for 7 doses.  Dose: 1 Tablet            Allergies  No Known Allergies    DIET  Orders Placed This Encounter   Procedures   • Diet Order Diet: Regular     Standing Status:   Standing     Number of Occurrences:   1     Order Specific Question:   Diet:     Answer:   Regular [1]       ACTIVITY  As tolerated.  Weight bearing as tolerated    CONSULTATIONS  None    PROCEDURES      LABORATORY  Lab Results   Component Value Date    SODIUM 139 06/14/2022    POTASSIUM 3.5 (L) 06/14/2022    CHLORIDE 103 06/14/2022    CO2 26 06/14/2022    GLUCOSE 89 06/14/2022    BUN 10 06/14/2022    CREATININE 0.72 06/14/2022    CREATININE 0.8 12/18/2006        Lab Results   Component Value Date    WBC 6.2 06/14/2022    HEMOGLOBIN 12.3 (L) 06/14/2022    HEMATOCRIT 37.9 (L) 06/14/2022    PLATELETCT 157 (L) 06/14/2022        Total time of the discharge process exceeds 35 minutes.

## 2022-06-27 ENCOUNTER — PATIENT OUTREACH (OUTPATIENT)
Dept: HEALTH INFORMATION MANAGEMENT | Facility: OTHER | Age: 27
End: 2022-06-27
Payer: MEDICAID

## 2022-06-27 NOTE — PROGRESS NOTES
6/27/2022- Post discharge follow up call to pt  as pt requested. Gaby states she had recently spoke to Adama and he had not yet received his medications. She is not sure why but, he did meet with his PCP today. She will get together in person with pt later today and will call me back at that time. CHW to assist once pt calls back.

## 2022-06-28 ENCOUNTER — HOSPITAL ENCOUNTER (OUTPATIENT)
Dept: LAB | Facility: MEDICAL CENTER | Age: 27
End: 2022-06-28
Attending: NURSE PRACTITIONER
Payer: MEDICAID

## 2022-06-28 LAB
25(OH)D3 SERPL-MCNC: 35 NG/ML (ref 30–100)
ALBUMIN SERPL BCP-MCNC: 4.3 G/DL (ref 3.2–4.9)
ALBUMIN/GLOB SERPL: 1.5 G/DL
ALP SERPL-CCNC: 91 U/L (ref 30–99)
ALT SERPL-CCNC: 19 U/L (ref 2–50)
ANION GAP SERPL CALC-SCNC: 13 MMOL/L (ref 7–16)
AST SERPL-CCNC: 14 U/L (ref 12–45)
BASOPHILS # BLD AUTO: 1.1 % (ref 0–1.8)
BASOPHILS # BLD: 0.09 K/UL (ref 0–0.12)
BILIRUB SERPL-MCNC: 1.3 MG/DL (ref 0.1–1.5)
BUN SERPL-MCNC: 9 MG/DL (ref 8–22)
CALCIUM SERPL-MCNC: 9.5 MG/DL (ref 8.5–10.5)
CHLORIDE SERPL-SCNC: 103 MMOL/L (ref 96–112)
CHOLEST SERPL-MCNC: 165 MG/DL (ref 100–199)
CO2 SERPL-SCNC: 20 MMOL/L (ref 20–33)
CREAT SERPL-MCNC: 0.68 MG/DL (ref 0.5–1.4)
EOSINOPHIL # BLD AUTO: 0.02 K/UL (ref 0–0.51)
EOSINOPHIL NFR BLD: 0.2 % (ref 0–6.9)
ERYTHROCYTE [DISTWIDTH] IN BLOOD BY AUTOMATED COUNT: 43.3 FL (ref 35.9–50)
FASTING STATUS PATIENT QL REPORTED: NORMAL
FERRITIN SERPL-MCNC: 366 NG/ML (ref 22–322)
FOLATE SERPL-MCNC: 11.2 NG/ML
GFR SERPLBLD CREATININE-BSD FMLA CKD-EPI: 129 ML/MIN/1.73 M 2
GLOBULIN SER CALC-MCNC: 2.9 G/DL (ref 1.9–3.5)
GLUCOSE SERPL-MCNC: 81 MG/DL (ref 65–99)
HCT VFR BLD AUTO: 42 % (ref 42–52)
HDLC SERPL-MCNC: 46 MG/DL
HGB BLD-MCNC: 14 G/DL (ref 14–18)
IMM GRANULOCYTES # BLD AUTO: 0.03 K/UL (ref 0–0.11)
IMM GRANULOCYTES NFR BLD AUTO: 0.4 % (ref 0–0.9)
IRON SERPL-MCNC: 52 UG/DL (ref 50–180)
LDLC SERPL CALC-MCNC: 100 MG/DL
LYMPHOCYTES # BLD AUTO: 0.71 K/UL (ref 1–4.8)
LYMPHOCYTES NFR BLD: 8.4 % (ref 22–41)
MCH RBC QN AUTO: 30.3 PG (ref 27–33)
MCHC RBC AUTO-ENTMCNC: 33.3 G/DL (ref 33.7–35.3)
MCV RBC AUTO: 90.9 FL (ref 81.4–97.8)
MONOCYTES # BLD AUTO: 0.58 K/UL (ref 0–0.85)
MONOCYTES NFR BLD AUTO: 6.8 % (ref 0–13.4)
NEUTROPHILS # BLD AUTO: 7.06 K/UL (ref 1.82–7.42)
NEUTROPHILS NFR BLD: 83.1 % (ref 44–72)
NRBC # BLD AUTO: 0 K/UL
NRBC BLD-RTO: 0 /100 WBC
PLATELET # BLD AUTO: 282 K/UL (ref 164–446)
PMV BLD AUTO: 12 FL (ref 9–12.9)
POTASSIUM SERPL-SCNC: 2.8 MMOL/L (ref 3.6–5.5)
PREALB SERPL-MCNC: 22.8 MG/DL (ref 18–38)
PROT SERPL-MCNC: 7.2 G/DL (ref 6–8.2)
RBC # BLD AUTO: 4.62 M/UL (ref 4.7–6.1)
SODIUM SERPL-SCNC: 136 MMOL/L (ref 135–145)
TRANSFERRIN SERPL-MCNC: 241 MG/DL (ref 200–370)
TRIGL SERPL-MCNC: 96 MG/DL (ref 0–149)
VIT B12 SERPL-MCNC: 585 PG/ML (ref 211–911)
WBC # BLD AUTO: 8.5 K/UL (ref 4.8–10.8)

## 2022-06-28 PROCEDURE — 36415 COLL VENOUS BLD VENIPUNCTURE: CPT

## 2022-06-28 PROCEDURE — 85025 COMPLETE CBC W/AUTO DIFF WBC: CPT

## 2022-06-28 PROCEDURE — 84134 ASSAY OF PREALBUMIN: CPT

## 2022-06-28 PROCEDURE — 82607 VITAMIN B-12: CPT

## 2022-06-28 PROCEDURE — 82728 ASSAY OF FERRITIN: CPT

## 2022-06-28 PROCEDURE — 84466 ASSAY OF TRANSFERRIN: CPT

## 2022-06-28 PROCEDURE — 82306 VITAMIN D 25 HYDROXY: CPT

## 2022-06-28 PROCEDURE — 82746 ASSAY OF FOLIC ACID SERUM: CPT

## 2022-06-28 PROCEDURE — 80061 LIPID PANEL: CPT

## 2022-06-28 PROCEDURE — 80053 COMPREHEN METABOLIC PANEL: CPT

## 2022-06-28 PROCEDURE — 84425 ASSAY OF VITAMIN B-1: CPT

## 2022-06-28 PROCEDURE — 84630 ASSAY OF ZINC: CPT

## 2022-06-28 PROCEDURE — 84207 ASSAY OF VITAMIN B-6: CPT

## 2022-06-28 PROCEDURE — 84252 ASSAY OF VITAMIN B-2: CPT

## 2022-06-28 PROCEDURE — 83540 ASSAY OF IRON: CPT

## 2022-06-30 ENCOUNTER — PATIENT OUTREACH (OUTPATIENT)
Dept: HEALTH INFORMATION MANAGEMENT | Facility: OTHER | Age: 27
End: 2022-06-30
Payer: MEDICAID

## 2022-06-30 LAB — ZINC SERPL-MCNC: 65.9 UG/DL (ref 60–120)

## 2022-06-30 NOTE — PROGRESS NOTES
6/30/22- CHW to close out pt from caseload. Pt  informed if she or the patient needs additional follow up care needs, they will reach out to CCM. All needs met.

## 2022-07-02 LAB
VIT B1 BLD-MCNC: 110 NMOL/L (ref 70–180)
VIT B2 SERPL-SCNC: 7 NMOL/L (ref 5–50)

## 2022-07-03 LAB — VIT B6 SERPL-MCNC: 15.6 NMOL/L (ref 20–125)

## 2022-09-15 ENCOUNTER — APPOINTMENT (OUTPATIENT)
Dept: LAB | Facility: MEDICAL CENTER | Age: 27
End: 2022-09-15
Payer: MEDICAID

## 2022-12-14 ENCOUNTER — APPOINTMENT (OUTPATIENT)
Dept: RADIOLOGY | Facility: MEDICAL CENTER | Age: 27
End: 2022-12-14
Attending: EMERGENCY MEDICINE
Payer: MEDICAID

## 2022-12-14 ENCOUNTER — HOSPITAL ENCOUNTER (EMERGENCY)
Facility: MEDICAL CENTER | Age: 27
End: 2022-12-14
Attending: EMERGENCY MEDICINE
Payer: MEDICAID

## 2022-12-14 VITALS
OXYGEN SATURATION: 100 % | RESPIRATION RATE: 18 BRPM | BODY MASS INDEX: 46.65 KG/M2 | TEMPERATURE: 97.8 F | WEIGHT: 315 LBS | SYSTOLIC BLOOD PRESSURE: 136 MMHG | HEIGHT: 69 IN | DIASTOLIC BLOOD PRESSURE: 73 MMHG | HEART RATE: 62 BPM

## 2022-12-14 DIAGNOSIS — S80.12XA CONTUSION OF LEFT CALF, INITIAL ENCOUNTER: ICD-10-CM

## 2022-12-14 PROCEDURE — 93971 EXTREMITY STUDY: CPT | Mod: LT

## 2022-12-14 PROCEDURE — 99284 EMERGENCY DEPT VISIT MOD MDM: CPT

## 2022-12-14 RX ORDER — ARIPIPRAZOLE 15 MG/1
30 TABLET ORAL DAILY
COMMUNITY

## 2022-12-14 RX ORDER — TRAZODONE HYDROCHLORIDE 50 MG/1
50 TABLET ORAL NIGHTLY
COMMUNITY

## 2022-12-14 RX ORDER — CARBAMAZEPINE 200 MG/1
200 TABLET, EXTENDED RELEASE ORAL 2 TIMES DAILY
COMMUNITY

## 2022-12-14 RX ORDER — TOPIRAMATE 25 MG/1
25 TABLET ORAL 2 TIMES DAILY
COMMUNITY

## 2022-12-14 RX ORDER — ATORVASTATIN CALCIUM 20 MG/1
20 TABLET, FILM COATED ORAL NIGHTLY
COMMUNITY

## 2022-12-14 ASSESSMENT — FIBROSIS 4 INDEX: FIB4 SCORE: 0.33

## 2022-12-14 ASSESSMENT — LIFESTYLE VARIABLES
CONSUMPTION TOTAL: NEGATIVE
ON A TYPICAL DAY WHEN YOU DRINK ALCOHOL HOW MANY DRINKS DO YOU HAVE: 0
AVERAGE NUMBER OF DAYS PER WEEK YOU HAVE A DRINK CONTAINING ALCOHOL: 0
EVER FELT BAD OR GUILTY ABOUT YOUR DRINKING: NO
TOTAL SCORE: 0
HAVE YOU EVER FELT YOU SHOULD CUT DOWN ON YOUR DRINKING: NO
HAVE PEOPLE ANNOYED YOU BY CRITICIZING YOUR DRINKING: NO
TOTAL SCORE: 0
DO YOU DRINK ALCOHOL: NO
TOTAL SCORE: 0
HOW MANY TIMES IN THE PAST YEAR HAVE YOU HAD 5 OR MORE DRINKS IN A DAY: 0
EVER HAD A DRINK FIRST THING IN THE MORNING TO STEADY YOUR NERVES TO GET RID OF A HANGOVER: NO

## 2022-12-15 NOTE — ED TRIAGE NOTES
"Adama Portillo [facility]  Chief Complaint   Patient presents with    Leg Swelling     LEFT    Leg Pain     LEFT       Pt ambulatory with steady gait to triage with above complaint. Pt reports roller skating and states he fell x2. Pt now having occasional pain to left lower calf. No deformity or discoloration noted. Pt denies any pain at this time, stating \"it only hurts at night when I sleep.\" NAD noted at this time. Pt denies any pain upon palpation to calf area, skin was warm and dry.    /70   Pulse 77   Temp 36.2 °C (97.1 °F) (Temporal)   Resp 18   Ht 1.753 m (5' 9\")   Wt (!) 154 kg (340 lb 6.2 oz)   SpO2 98%   BMI 50.27 kg/m²     Pt informed of triage process and encouraged to notify staff of any changes or concerns. Pt verbalized understanding of instructions. Apologized for long wait time. Pt placed back in lobby.     "

## 2022-12-15 NOTE — ED NOTES
Pt is discharged. VSS. Paperwork explained to pt by ERP and all questions answered. All belongings sent with pt upon departure. Pt ambulatory with a steady gait out of ED.

## 2022-12-15 NOTE — DISCHARGE INSTRUCTIONS
Ibuprofen and Tylenol for pain.  You can use ice to the leg 20 minutes on, 20 minutes off as often as possible. Return if you have new or different chest pain, shortness of breath, cough up blood or pass out.

## 2022-12-15 NOTE — ED PROVIDER NOTES
"ED Provider Note    Scribed for Daniel Au M.D. by Annette Martínez. 12/14/2022, 5:10 PM.    Primary care provider: [facility]  Means of arrival: Walk-in  History obtained from: Patient   History limited by: None     CHIEF COMPLAINT  Chief Complaint   Patient presents with    Leg Swelling     LEFT    Leg Pain     LEFT         HPI  Adama Burt III is a 27 y.o. male who presents to the Emergency Department for evaluation of left lower leg pain onset Sunday prior to arrival. Patient states that he went roller skating and fell on his leg. He states that he hit his left calf on the wheel of his roller blade twice in the same spot. He thinks that it may be bruised, but is concerned of a blood clot. He notes that he is also on his feet a lot at work. Patient reports that he has a history of blood clots in the same leg. He reports that it hurts when he is sleeping and feels \"crampy\". He denies taking any blood thinning medications. He admits to associated symptoms of left lower leg swelling, but denies left knee pain or left ankle pain. No alleviating or exacerbating factors were reported.     REVIEW OF SYSTEMS  Pertinent positives include left lower leg pain and swelling. Pertinent negatives include left knee pain or left ankle pain.     PAST MEDICAL HISTORY   has a past medical history of ADHD (attention deficit hyperactivity disorder), Apnea, sleep, Bipolar 1 disorder (HCC), Blood clotting disorder (HCC), Breath shortness, Daytime sleepiness, Gasping for breath, High cholesterol, Hypertension, Obesity, ODD (oppositional defiant disorder), Pneumonia (04/2021), Sleep apnea, and Snoring.    SURGICAL HISTORY   has a past surgical history that includes appendectomy child; tonsillectomy (3/17/2010); other; and lap, pieter restrict proc, longitudinal gas* (N/A, 10/29/2021).    SOCIAL HISTORY  Social History     Tobacco Use    Smoking status: Never    Smokeless tobacco: Never   Vaping Use    Vaping " "Use: Never used   Substance Use Topics    Alcohol use: Yes     Comment: rare     Drug use: No      Social History     Substance and Sexual Activity   Drug Use No       FAMILY HISTORY  Family History   Family history unknown: Yes       CURRENT MEDICATIONS  Home Medications       Reviewed by Igor Trevizo R.N. (Registered Nurse) on 12/14/22 at 1649  Med List Status: Partial     Medication Last Dose Status   ARIPiprazole (ABILIFY) 15 MG Tab  Active   atorvastatin (LIPITOR) 20 MG Tab  Active   carbamazepine SR (TEGRETOL XR) 200 MG TABLET SR 12 HR extended-release tablet  Active   topiramate (TOPAMAX) 25 MG Tab  Active   traZODone (DESYREL) 50 MG Tab  Active                    ALLERGIES  No Known Allergies    PHYSICAL EXAM  VITAL SIGNS: /70   Pulse 77   Temp 36.2 °C (97.1 °F) (Temporal)   Resp 18   Ht 1.753 m (5' 9\")   Wt (!) 154 kg (340 lb 6.2 oz)   SpO2 98%   BMI 50.27 kg/m²     Constitutional: Well developed, Well nourished, No acute distress.   HENT: Normocephalic, Atraumatic, mask in place.   Cardiovascular: Normal heart rate, Normal rhythm, No murmurs, equal pulses.   Pulmonary: Normal breath sounds, No respiratory distress, No wheezing, No rales, No rhonchi.  Abdomen:Soft, No tenderness, No masses, no rebound, no guarding.   Musculoskeletal: No major deformities noted,. Left leg: calf medial contusion and tenderness over posterior calf, Left leg more swollen than Right, No pain or tenderness in knee or ankle.   Skin: Warm, Dry, No erythema, No rash.   Neurologic: Alert & oriented x 3, Normal motor function,  No focal deficits noted.   Psychiatric: Affect normal, Judgment normal, Mood normal.     RADIOLOGY  US-EXTREMITY VENOUS LOWER UNILAT LEFT   Final Result      No DVT  The radiologist's interpretation of all radiological studies have been reviewed by me.    COURSE & MEDICAL DECISION MAKING  Pertinent Labs & Imaging studies reviewed. (See chart for details)    5:10 PM - Patient seen and examined " at bedside. Ordered US extremity lower left to evaluate his symptoms. Discussed plan of care with patient. I informed them that imaging will be ordered to evaluate symptoms. Patient is understanding and agreeable with plan.      6:59 PM - I reevaluated the patient at bedside. I discussed the patient's diagnostic study results which show no blood clots. I discussed plan for discharge and follow up as outlined below. The patient is stable for discharge at this time and will return for any new or worsening symptoms. Patient verbalizes understanding and support with my plan for discharge.     Medical Decision Making: Patient presents with pain and tenderness in the left calf with a history of previous DVT.  At this point time this appears to be secondary to a contusion.  There is no DVT seen on ultrasound.  Patient denies any chest pain, shortness of breath, do not think he has a pulmonary embolism.      DISPOSITION:  Patient will be discharged home in stable condition.    FOLLOW UP:  Stoney Meneses, A.P.R.N.  [address] [facility] [address]  [facility] [id number] [phone]      As needed      OUTPATIENT MEDICATIONS:  New Prescriptions    No medications on file        FINAL IMPRESSION  1. Contusion of left calf, initial encounter          Annette CARDONA (Mychalibronel), am scribing for, and in the presence of, Daniel Au M.D.    Electronically signed by: Annette Martínez (Tony), 12/14/2022    Daniel CARDONA M.D. personally performed the services described in this documentation, as scribed by Annette Martínez in my presence, and it is both accurate and complete. E.     The note accurately reflects work and decisions made by me.  Daniel Au M.D.  12/14/2022  7:20 PM

## 2024-02-20 ENCOUNTER — APPOINTMENT (OUTPATIENT)
Dept: RADIOLOGY | Facility: MEDICAL CENTER | Age: 29
End: 2024-02-20
Attending: EMERGENCY MEDICINE
Payer: MEDICAID

## 2024-02-20 ENCOUNTER — HOSPITAL ENCOUNTER (EMERGENCY)
Facility: MEDICAL CENTER | Age: 29
End: 2024-02-21
Attending: EMERGENCY MEDICINE
Payer: MEDICAID

## 2024-02-20 DIAGNOSIS — S86.811A STRAIN OF RIGHT CALF MUSCLE: Primary | ICD-10-CM

## 2024-02-20 LAB — D DIMER PPP IA.FEU-MCNC: 0.88 UG/ML (FEU) (ref 0–0.5)

## 2024-02-20 PROCEDURE — 99283 EMERGENCY DEPT VISIT LOW MDM: CPT

## 2024-02-20 PROCEDURE — 36415 COLL VENOUS BLD VENIPUNCTURE: CPT

## 2024-02-20 PROCEDURE — 85379 FIBRIN DEGRADATION QUANT: CPT

## 2024-02-20 ASSESSMENT — FIBROSIS 4 INDEX: FIB4 SCORE: 0.34

## 2024-02-21 VITALS
TEMPERATURE: 97.3 F | SYSTOLIC BLOOD PRESSURE: 132 MMHG | HEIGHT: 69 IN | HEART RATE: 53 BPM | WEIGHT: 315 LBS | RESPIRATION RATE: 18 BRPM | DIASTOLIC BLOOD PRESSURE: 73 MMHG | OXYGEN SATURATION: 95 % | BODY MASS INDEX: 46.65 KG/M2

## 2024-02-21 PROCEDURE — 93971 EXTREMITY STUDY: CPT | Mod: RT

## 2024-02-21 PROCEDURE — 99283 EMERGENCY DEPT VISIT LOW MDM: CPT

## 2024-02-21 PROCEDURE — 36415 COLL VENOUS BLD VENIPUNCTURE: CPT

## 2024-02-21 NOTE — ED NOTES
Pt ambulated to room 70 with steady gait. C/C is Leg pain.  on the monitor and call light is within reach. Chart up for ERP

## 2024-02-21 NOTE — ED TRIAGE NOTES
Chief Complaint   Patient presents with    Leg Pain     Right lower leg pain     27 y/o male ambulatory to triage with above complain. Pt states pain in right calf increases after when standing and walking for long periods of time x2 days. Pt is able to bear weight and ambulate with steady gait. Pt denies any trauma to the leg.

## 2024-02-21 NOTE — ED PROVIDER NOTES
ER Provider Note    Scribed for Blayne Colbert Ii, M.d. by Ronni Carson. 2/20/2024  10:46 PM    Primary Care Provider: [facility]    CHIEF COMPLAINT  Chief Complaint   Patient presents with    Leg Pain     Right lower leg pain     EXTERNAL RECORDS REVIEWED  Inpatient Notes The patient was admitted on 6/12/22 for cellulitis in the left lower extremity. He was also admitted in April of 2021 and had a thrombosis in his left lower extremity in setting of cellulitis. The only ultrasound on record is from 12/14/22 and shows no DVT.  HPI/ROS  LIMITATION TO HISTORY   Select: : None  OUTSIDE HISTORIAN(S):  None    Adama Burt III is a 28 y.o. male with a history of DVT who presents to the ED complaining of right lower leg pain onset 2 days ago. The patient reports he noticed a cramping pain in his right calf after basketball practice and initially thought it was a pulled muscle. He notes he has had a DVT in his left leg before, but at that time he was experiencing other symptoms of fever and pneumonia that he is not experiencing currently.    PAST MEDICAL HISTORY  Past Medical History:   Diagnosis Date    ADHD (attention deficit hyperactivity disorder)     Apnea, sleep     Bipolar 1 disorder (HCC)     Blood clotting disorder (HCC)     DVT LLE - April 2021    Breath shortness     with exertion, pt states chronic     Daytime sleepiness     Gasping for breath     High cholesterol     Hypertension     Obesity     ODD (oppositional defiant disorder)     Pneumonia 04/2021    Sleep apnea     cpap, pt uses o2 with cpap machine unsure how many liters     Snoring        SURGICAL HISTORY  Past Surgical History:   Procedure Laterality Date    OK LAP, CHRISS RESTRICT PROC, LONGITUDINAL GAS* N/A 10/29/2021    Procedure: GASTRECTOMY, SLEEVE, LAPAROSCOPIC;  Surgeon: Joni Quiles M.D.;  Location: SURGERY [facility];  Service: General    TONSILLECTOMY  3/17/2010    Performed by ANKIT HERNANDEZ at SURGERY  "SAME DAY [facility] ORS    APPENDECTOMY CHILD      OTHER      wisdom teeth extraction        FAMILY HISTORY  Family History   Family history unknown: Yes       SOCIAL HISTORY   reports that he has never smoked. He has never used smokeless tobacco. He reports current alcohol use. He reports that he does not use drugs.    CURRENT MEDICATIONS  Previous Medications    ARIPIPRAZOLE (ABILIFY) 15 MG TAB    Take 30 mg by mouth every day.    ATORVASTATIN (LIPITOR) 20 MG TAB    Take 20 mg by mouth every evening.    CARBAMAZEPINE SR (TEGRETOL XR) 200 MG TABLET SR 12 HR EXTENDED-RELEASE TABLET    Take 200 mg by mouth 2 times a day.    TOPIRAMATE (TOPAMAX) 25 MG TAB    Take 25 mg by mouth 2 times a day.    TRAZODONE (DESYREL) 50 MG TAB    Take 50 mg by mouth every evening.       ALLERGIES  Patient has no known allergies.    PHYSICAL EXAM  BP (!) 124/90   Pulse 67   Temp 36.1 °C (97 °F) (Temporal)   Resp 18   Ht 1.753 m (5' 9\")   Wt (!) 153 kg (337 lb 8.4 oz)   SpO2 100%   BMI 49.84 kg/m²   Physical Exam  Vitals and nursing note reviewed.   Constitutional:       Comments: Increased bmi body habitus   HENT:      Head: Normocephalic and atraumatic.   Cardiovascular:      Rate and Rhythm: Normal rate and regular rhythm.   Pulmonary:      Effort: Pulmonary effort is normal.   Musculoskeletal:      Comments: No asymmetry of lower legs. Tenderness at right lower calf just above achilles. Strong PT pulse.           DIAGNOSTIC STUDIES    Labs:   Results for orders placed or performed during the hospital encounter of 02/20/24   D-DIMER   Result Value Ref Range    D-Dimer 0.88 (H) 0.00 - 0.50 ug/mL (FEU)            Radiology:     Radiologist interpretation:    PROCEDURES:   Right lower extremity venous duplex imaging.    The following venous structures were evaluated: common femoral, deep    femoral, proximal great saphenous, femoral, popliteal, peroneal, and    posterior tibial veins.    Serial compression, color, and spectral " Doppler flow evaluations were    performed.       FINDINGS:   Right lower extremity.    The peroneal and posterior tibial veins are difficult to assess for    compressibility, but flow response to augmentation is demonstrated.    All other veins demonstrate complete color filling and compressibility with    normal venous flow dynamics including spontaneous flow and respiratory    phasicity.    No deep venous thrombosis.       Flow was evaluated in the contralateral common femoral vein and normal    venous flow dynamics including spontaneous flow and respiratory phasic    variation were demonstrated.        COURSE & MEDICAL DECISION MAKING     ED Observation Status? No; Patient does not meet criteria for ED Observation.     INITIAL ASSESSMENT, COURSE AND PLAN  Care Narrative:     10:50 PM - Patient seen and examined at bedside. The patient is a 28 year old male with a history of DVT who presents with right lower leg pain onset 2 days ago. Discussed plan of care, including obtaining labs to see if his current status warrants an ultrasound. Patient agrees to the plan of care. Ordered for d-dimer to evaluate his symptoms. Differential diagnoses include but not limited to: calf strain vs DVT.    11:49 PM  I reevaluated the patient at bedside. I informed the patient his d-dimer is positive, so I will order an ultrasound. Patient verbalizes understanding and agreement to this plan of care. Ordered US-Extremity venous lower unilat right to evaluate.    12:18 AM  No thrombosis seen on US.  Most likely calf strain from playing sports. Discussed RICE and discharged in good condition.     PROBLEM LIST  #Right calf strain        FINAL DIAGNOSIS  1. Strain of right calf muscle         Ronni CARDONA (Tony), am scribing for, and in the presence of, ELAINE Cortez II.    Electronically signed by: Ronni Turner), 2/20/2024    Blayne CARDONA II, M* personally performed the services described in this  documentation, as scribed by Ronni Carson in my presence, and it is both accurate and complete.      The note accurately reflects work and decisions made by me.  Blayne Colbert II, M.D.  2/21/2024  12:19 AM

## 2024-02-21 NOTE — ED NOTES
Pt provided DC paperwork, IV removed, MTM called for pt, pt ambulated with steady gait to [facility] for DC, awaiting ride in the lobby

## 2024-02-21 NOTE — ED NOTES
Bedside report received by MARLENE Worrell    Oxygen:RA  Fall Risk status: bed in lowest position/locked, call light within reach  Patient Mentation:A+O x 4  Pending: US
